# Patient Record
Sex: MALE | Race: ASIAN | Employment: UNEMPLOYED | URBAN - METROPOLITAN AREA
[De-identification: names, ages, dates, MRNs, and addresses within clinical notes are randomized per-mention and may not be internally consistent; named-entity substitution may affect disease eponyms.]

---

## 2022-05-25 ENCOUNTER — OFFICE VISIT (OUTPATIENT)
Dept: ORTHOPEDIC SURGERY | Age: 52
End: 2022-05-25
Payer: COMMERCIAL

## 2022-05-25 VITALS
WEIGHT: 233.4 LBS | HEIGHT: 70 IN | OXYGEN SATURATION: 99 % | TEMPERATURE: 97.1 F | BODY MASS INDEX: 33.41 KG/M2 | HEART RATE: 66 BPM

## 2022-05-25 DIAGNOSIS — G89.29 CHRONIC PAIN OF LEFT KNEE: Primary | ICD-10-CM

## 2022-05-25 DIAGNOSIS — M17.12 PRIMARY OSTEOARTHRITIS OF LEFT KNEE: ICD-10-CM

## 2022-05-25 DIAGNOSIS — M25.562 CHRONIC PAIN OF LEFT KNEE: Primary | ICD-10-CM

## 2022-05-25 PROCEDURE — 99203 OFFICE O/P NEW LOW 30 MIN: CPT | Performed by: SPECIALIST

## 2022-05-25 PROCEDURE — 20610 DRAIN/INJ JOINT/BURSA W/O US: CPT | Performed by: SPECIALIST

## 2022-05-25 PROCEDURE — 73562 X-RAY EXAM OF KNEE 3: CPT | Performed by: SPECIALIST

## 2022-05-25 RX ORDER — BETAMETHASONE SODIUM PHOSPHATE AND BETAMETHASONE ACETATE 3; 3 MG/ML; MG/ML
3 INJECTION, SUSPENSION INTRA-ARTICULAR; INTRALESIONAL; INTRAMUSCULAR; SOFT TISSUE ONCE
Status: COMPLETED | OUTPATIENT
Start: 2022-05-25 | End: 2022-05-25

## 2022-05-25 RX ADMIN — BETAMETHASONE SODIUM PHOSPHATE AND BETAMETHASONE ACETATE 3 MG: 3; 3 INJECTION, SUSPENSION INTRA-ARTICULAR; INTRALESIONAL; INTRAMUSCULAR; SOFT TISSUE at 09:28
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
## 2022-05-25 NOTE — PATIENT INSTRUCTIONS
Learning About Low-Carbohydrate Diets  What is a low-carbohydrate diet? A low-carbohydrate (or \"low-carb\") diet limits foods and drinks that have carbohydrates. This includes grains, fruits, milk and yogurt, and starchy vegetables like potatoes, beans, and corn. It also avoids foods and drinks that have added sugar. Instead, low-carb diets include foods that are high in protein and fat. Why might you follow a low-carb diet? Low-carb diets may be used for a variety of reasons, such as for weight loss. People who have diabetes may use a low-carb diet to help manage their blood sugar levels. What should you do before you start the diet? Talk to your doctor before you try any diet. This is even more important if you have health problems like kidney disease, heart disease, or diabetes. Your doctor may suggest that you meet with a registered dietitian. A dietitian can help you make an eating plan that works for you. What foods do you eat on a low-carb diet? On a low-carb diet, you choose foods that are high in protein and fat. Examples of these are:  · Meat, poultry, and fish. · Eggs. · Nuts, such as walnuts, pecans, almonds, and peanuts. · Peanut butter and other nut butters. · Tofu. · Avocado. · [place]. · Non-starchy vegetables like broccoli, cauliflower, green beans, mushrooms, peppers, lettuce, and spinach. · Unsweetened non-dairy milks like almond milk and coconut milk. · Cheese, cottage cheese, and cream cheese. Where can you learn more? Go to http://www.gray.com/  Enter C335 in the search box to learn more about \"Learning About Low-Carbohydrate Diets. \"  Current as of: September 8, 2021               Content Version: 13.2  © 1441-3631 [employer]wise[employer]. Care instructions adapted under license by [employer] (which disclaims liability or warranty for this information).  If you have questions about a medical condition or this instruction, always ask your healthcare professional. [address] any warranty or liability for your use of this information. Knee Arthritis: Exercises  Introduction  Here are some examples of exercises for you to try. The exercises may be suggested for a condition or for rehabilitation. Start each exercise slowly. Ease off the exercises if you start to have pain. You will be told when to start these exercises and which ones will work best for you. How to do the exercises  Knee flexion with heel slide    1. Lie on your back with your knees bent. 2. Slide your heel back by bending your affected knee as far as you can. Then hook your other foot around your ankle to help pull your heel even farther back. 3. Hold for about 6 seconds, then rest for up to 10 seconds. 4. Repeat 8 to 12 times. 5. Switch legs and repeat steps 1 through 4, even if only one knee is sore. Quad sets    1. Sit with your affected leg straight and supported on the floor or a firm bed. Place a small, rolled-up towel under your knee. Your other leg should be bent, with that foot flat on the floor. 2. Tighten the thigh muscles of your affected leg by pressing the back of your knee down into the towel. 3. Hold for about 6 seconds, then rest for up to 10 seconds. 4. Repeat 8 to 12 times. 5. Switch legs and repeat steps 1 through 4, even if only one knee is sore. Straight-leg raises to the front    1. Lie on your back with your good knee bent so that your foot rests flat on the floor. Your affected leg should be straight. Make sure that your low back has a normal curve. You should be able to slip your hand in between the floor and the small of your back, with your palm touching the floor and your back touching the back of your hand. 2. Tighten the thigh muscles in your affected leg by pressing the back of your knee flat down to the floor. Hold your knee straight.   3. Keeping the thigh muscles tight and your leg straight, lift your affected leg up so that your heel is about 12 inches off the floor. Hold for about 6 seconds, then lower slowly. 4. Relax for up to 10 seconds between repetitions. 5. Repeat 8 to 12 times. 6. Switch legs and repeat steps 1 through 5, even if only one knee is sore. Active knee flexion    1. Lie on your stomach with your knees straight. If your kneecap is uncomfortable, roll up a washcloth and put it under your leg just above your kneecap. 2. Lift the foot of your affected leg by bending the knee so that you bring the foot up toward your buttock. If this motion hurts, try it without bending your knee quite as far. This may help you avoid any painful motion. 3. Slowly move your leg up and down. 4. Repeat 8 to 12 times. 5. Switch legs and repeat steps 1 through 4, even if only one knee is sore. Quadriceps stretch (facedown)    1. Lie flat on your stomach, and rest your face on the floor. 2. Wrap a towel or belt strap around the lower part of your affected leg. Then use the towel or belt strap to slowly pull your heel toward your buttock until you feel a stretch. 3. Hold for about 15 to 30 seconds, then relax your leg against the towel or belt strap. 4. Repeat 2 to 4 times. 5. Switch legs and repeat steps 1 through 4, even if only one knee is sore. Stationary exercise bike    1. If you do not have a stationary exercise bike at home, you can find one to ride at your local health club or community center. 2. Adjust the height of the bike seat so that your knee is slightly bent when your leg is extended downward. If your knee hurts when the pedal reaches the top, you can raise the seat so that your knee does not bend as much. 3. Start slowly. At first, try to do 5 to 10 minutes of cycling with little to no resistance. Then increase your time and the resistance bit by bit until you can do 20 to 30 minutes without pain.   4. If you start to have pain, rest your knee until your pain gets back to the level that is normal for you. Or cycle for less time or with less effort. Follow-up care is a key part of your treatment and safety. Be sure to make and go to all appointments, and call your doctor if you are having problems. It's also a good idea to know your test results and keep a list of the medicines you take. Where can you learn more? Go to http://www.becerril.com/  Enter C159 in the search box to learn more about \"Knee Arthritis: Exercises. \"  Current as of: July 1, 2021               Content Version: 13.2  © 2612-9681 [employer]. Care instructions adapted under license by [employer] (which disclaims liability or warranty for this information). If you have questions about a medical condition or this instruction, always ask your healthcare professional. [address] any warranty or liability for your use of this information.

## 2022-07-16 ENCOUNTER — HOSPITAL ENCOUNTER (EMERGENCY)
Age: 52
Discharge: HOME | End: 2022-07-16
Attending: STUDENT IN AN ORGANIZED HEALTH CARE EDUCATION/TRAINING PROGRAM
Payer: COMMERCIAL

## 2022-07-16 ENCOUNTER — APPOINTMENT (OUTPATIENT)
Dept: GENERAL RADIOLOGY | Age: 52
End: 2022-07-16
Attending: PHYSICIAN ASSISTANT
Payer: COMMERCIAL

## 2022-07-16 VITALS
RESPIRATION RATE: 18 BRPM | HEIGHT: 70 IN | SYSTOLIC BLOOD PRESSURE: 138 MMHG | TEMPERATURE: 97.9 F | BODY MASS INDEX: 33.36 KG/M2 | HEART RATE: 74 BPM | DIASTOLIC BLOOD PRESSURE: 82 MMHG | OXYGEN SATURATION: 96 % | WEIGHT: 233 LBS

## 2022-07-16 DIAGNOSIS — M77.32 BONE SPUR OF INFERIOR PORTION OF LEFT CALCANEUS: ICD-10-CM

## 2022-07-16 DIAGNOSIS — S91.302A WOUND OF LEFT FOOT: Primary | ICD-10-CM

## 2022-07-16 PROCEDURE — 74011250636 HC RX REV CODE- 250/636: Performed by: PHYSICIAN ASSISTANT

## 2022-07-16 PROCEDURE — 73630 X-RAY EXAM OF FOOT: CPT

## 2022-07-16 PROCEDURE — 90715 TDAP VACCINE 7 YRS/> IM: CPT | Performed by: PHYSICIAN ASSISTANT

## 2022-07-16 PROCEDURE — 99284 EMERGENCY DEPT VISIT MOD MDM: CPT

## 2022-07-16 PROCEDURE — 90471 IMMUNIZATION ADMIN: CPT

## 2022-07-16 RX ORDER — CEPHALEXIN 500 MG/1
500 CAPSULE ORAL 4 TIMES DAILY
Qty: 28 CAPSULE | Refills: 0 | Status: SHIPPED | OUTPATIENT
Start: 2022-07-16 | End: 2022-07-23

## 2022-07-16 RX ORDER — NAPROXEN 500 MG/1
500 TABLET ORAL 2 TIMES DAILY WITH MEALS
Qty: 20 TABLET | Refills: 0 | Status: SHIPPED | OUTPATIENT
Start: 2022-07-16

## 2022-07-16 RX ADMIN — TETANUS TOXOID, REDUCED DIPHTHERIA TOXOID AND ACELLULAR PERTUSSIS VACCINE, ADSORBED 0.5 ML: 5; 2.5; 8; 8; 2.5 SUSPENSION INTRAMUSCULAR at 10:27

## 2022-07-16 NOTE — ED TRIAGE NOTES
Pt states their has been a wound on the bottom of his left foot times 1 week. Pt states the wound has been bleeding however he has no pain. Pt states he mishra not remember an specific injury.

## 2022-07-16 NOTE — ED PROVIDER NOTES
EMERGENCY DEPARTMENT HISTORY AND PHYSICAL EXAM      Date: 7/16/2022  Patient Name: Valorie Alicia    History of Presenting Illness     Chief Complaint   Patient presents with    Skin Problem     left foot        History Provided By: Patient    HPI: Valorie Alicia, 46 y.o. male no significant PMHx presents PMHx to the ED. Pt reports pain and wound to the bottom of his foot that began 1 week ago. Patient reports bleeding at times. Denies known trauma or injury. Patient unsure how wound occurred. Denies history of diabetes. Denies fever, chills, drainage. Patient has not taken anything for symptoms. Patient reports he has been applying ointment to wound. Unsure of last tetanus date. There are no other complaints, changes, or physical findings at this time. PCP: None    No current facility-administered medications on file prior to encounter. No current outpatient medications on file prior to encounter. Past History     Past Medical History:  No past medical history on file. Past Surgical History:  Past Surgical History:   Procedure Laterality Date    HX HERNIA REPAIR         Family History:  No family history on file. Social History:  Social History     Tobacco Use    Smoking status: Never Smoker    Smokeless tobacco: Never Used   Vaping Use    Vaping Use: Never used   Substance Use Topics    Alcohol use: Not Currently    Drug use: Never       Allergies:  No Known Allergies      Review of Systems   Review of Systems   Constitutional: Negative for chills and fever. HENT: Negative for facial swelling. Eyes: Negative for photophobia and visual disturbance. Respiratory: Negative for shortness of breath. Cardiovascular: Negative for chest pain. Gastrointestinal: Negative for abdominal pain, nausea and vomiting. Genitourinary: Negative for flank pain. Musculoskeletal: Positive for arthralgias (left foot). Skin: Negative for color change, pallor, rash and wound. Neurological: Negative for dizziness, weakness, light-headedness and headaches. All other systems reviewed and are negative. Physical Exam   Physical Exam  Vitals and nursing note reviewed. Constitutional:       General: He is not in acute distress. Appearance: He is well-developed. Comments: Pt in NAD   HENT:      Head: Normocephalic and atraumatic. Eyes:      Conjunctiva/sclera: Conjunctivae normal.   Cardiovascular:      Rate and Rhythm: Normal rate and regular rhythm. Heart sounds: Normal heart sounds. Pulmonary:      Effort: Pulmonary effort is normal. No respiratory distress. Breath sounds: Normal breath sounds. Abdominal:      General: Bowel sounds are normal.      Palpations: Abdomen is soft. Tenderness: There is no abdominal tenderness. Musculoskeletal:         General: Normal range of motion. Skin:     General: Skin is warm. Findings: No rash. Comments: Left foot: Maceration with superficial open wound to plantar aspect of foot. Nontender. No erythema or warmth. No foreign body visualized. Nontender. Surrounding sensation intact. Neurological:      Mental Status: He is alert and oriented to person, place, and time. Cranial Nerves: No cranial nerve deficit. Psychiatric:         Behavior: Behavior normal.         Diagnostic Study Results     Labs -   No results found for this or any previous visit (from the past 12 hour(s)). Radiologic Studies -   XR FOOT LT MIN 3 V    (Results Pending)     CT Results  (Last 48 hours)    None        CXR Results  (Last 48 hours)    None          Medical Decision Making   I am the first provider for this patient. I reviewed the vital signs, available nursing notes, past medical history, past surgical history, family history and social history. Vital Signs-Reviewed the patient's vital signs.   Patient Vitals for the past 12 hrs:   Temp Pulse Resp BP SpO2   07/16/22 0959 97.9 °F (36.6 °C) -- -- -- -- 07/16/22 0954 -- 74 18 138/82 96 %     Records Reviewed: Nursing Notes, Old Medical Records, Previous Radiology Studies and Previous Laboratory Studies    Provider Notes (Medical Decision Making):   DDx: Maceration, Laceration, Fb in wound, Tetanus, Abrasion    45 yo M who presents with wound to plantar aspect of foot that occurred 1 week ago. On exam macerated skin with superficial open wound. No signs of infection. Xray shows superficial wound vs FB. US used to confirm no FB. Tetanus updated in ED. Will cover with abx and discussed importance of keeping wound open to air. Stop applying ointment. At time of discharge, pt non-toxic appearing in NAD. Pt stable for prompt outpatient follow-up with PCP 1 to 2 days. Patient given strict instructions to return if symptoms worsen. ED Course:   Initial assessment performed. The patients presenting problems have been discussed, and they are in agreement with the care plan formulated and outlined with them. I have encouraged them to ask questions as they arise throughout their visit. Xray shows possible FB vs superficial wound. Ultrasound performed and no foreign body visualized. Disposition:  10:11 AM  Discussed imaging results with pt along with dx and treatment plan. Discussed importance of PCP follow up. All questions answered. Pt voiced they understood. Return if sx worsen. PLAN:  1. Current Discharge Medication List      START taking these medications    Details   cephALEXin (Keflex) 500 mg capsule Take 1 Capsule by mouth four (4) times daily for 7 days. Qty: 28 Capsule, Refills: 0  Start date: 7/16/2022, End date: 7/23/2022           2.    Follow-up Information     Follow up With Specialties Details Why [address] [place] [address]  Schedule an appointment as soon as possible for a visit in 1 day  Tulio Reynoso [address]  Chele Erika [address] [place] [address] DEVORAHMichael [facility] EMERGENCY DEPT Emergency Medicine  As needed, If symptoms worsen [address] [facility] [address]  [phone]        Return to ED if worse     Diagnosis     Clinical Impression:   1. Wound of left foot        Attestations:    MASHA Arana    Please note that this dictation was completed with [employer], the computer voice recognition software. Quite often unanticipated grammatical, syntax, homophones, and other interpretive errors are inadvertently transcribed by the computer software. Please disregard these errors. Please excuse any errors that have escaped final proofreading. Thank you.

## 2022-07-21 ENCOUNTER — OFFICE VISIT (OUTPATIENT)
Dept: ORTHOPEDIC SURGERY | Age: 52
End: 2022-07-21
Payer: COMMERCIAL

## 2022-07-21 VITALS
BODY MASS INDEX: 32.7 KG/M2 | OXYGEN SATURATION: 98 % | WEIGHT: 233.6 LBS | TEMPERATURE: 97.7 F | HEART RATE: 70 BPM | HEIGHT: 71 IN

## 2022-07-21 DIAGNOSIS — M17.12 PRIMARY OSTEOARTHRITIS OF LEFT KNEE: Primary | ICD-10-CM

## 2022-07-21 DIAGNOSIS — M25.562 CHRONIC PAIN OF LEFT KNEE: ICD-10-CM

## 2022-07-21 DIAGNOSIS — G89.29 CHRONIC PAIN OF LEFT KNEE: ICD-10-CM

## 2022-07-21 PROCEDURE — 20610 DRAIN/INJ JOINT/BURSA W/O US: CPT | Performed by: SPECIALIST

## 2022-07-21 RX ORDER — BETAMETHASONE SODIUM PHOSPHATE AND BETAMETHASONE ACETATE 3; 3 MG/ML; MG/ML
3 INJECTION, SUSPENSION INTRA-ARTICULAR; INTRALESIONAL; INTRAMUSCULAR; SOFT TISSUE ONCE
Status: COMPLETED | OUTPATIENT
Start: 2022-07-21 | End: 2022-07-21

## 2022-07-21 RX ADMIN — BETAMETHASONE SODIUM PHOSPHATE AND BETAMETHASONE ACETATE 3 MG: 3; 3 INJECTION, SUSPENSION INTRA-ARTICULAR; INTRALESIONAL; INTRAMUSCULAR; SOFT TISSUE at 11:09
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
## 2022-08-08 ENCOUNTER — HOSPITAL ENCOUNTER (OUTPATIENT)
Dept: PHYSICAL THERAPY | Age: 52
Discharge: HOME | End: 2022-08-08
Attending: SPECIALIST
Payer: COMMERCIAL

## 2022-08-08 DIAGNOSIS — M25.562 CHRONIC PAIN OF LEFT KNEE: ICD-10-CM

## 2022-08-08 DIAGNOSIS — M17.12 PRIMARY OSTEOARTHRITIS OF LEFT KNEE: Primary | ICD-10-CM

## 2022-08-08 DIAGNOSIS — G89.29 CHRONIC PAIN OF LEFT KNEE: ICD-10-CM

## 2022-08-08 PROCEDURE — 97162 PT EVAL MOD COMPLEX 30 MIN: CPT
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
## 2022-08-17 ENCOUNTER — TELEPHONE (OUTPATIENT)
Dept: PHYSICAL THERAPY | Age: 52
End: 2022-08-17

## 2022-08-23 ENCOUNTER — HOSPITAL ENCOUNTER (OUTPATIENT)
Dept: PHYSICAL THERAPY | Age: 52
Discharge: HOME | End: 2022-08-23
Attending: SPECIALIST
Payer: COMMERCIAL

## 2022-08-23 PROCEDURE — 97110 THERAPEUTIC EXERCISES: CPT

## 2022-08-23 PROCEDURE — 97530 THERAPEUTIC ACTIVITIES: CPT

## 2022-08-23 PROCEDURE — 97112 NEUROMUSCULAR REEDUCATION: CPT

## 2022-08-23 NOTE — PROGRESS NOTES
PT DAILY TREATMENT NOTE     Patient Name: Alfonso López  Date:2022  : 1970  [x]  Patient  Verified  Payor: Darin Jeter / Plan: [address] / Product Type: Managed Care Medicaid /    In time:10;30  Out time:11:15  Total Treatment Time (min): 45  Visit #: 2 of 10    Medicare/BCBS Only   Total Timed Codes (min):   1:1 Treatment Time:         Treatment Area: No admission diagnoses are documented for this encounter. SUBJECTIVE  Pain Level (0-10 scale): 7  Any medication changes, allergies to medications, adverse drug reactions, diagnosis change, or new procedure performed?: [x] No    [] Yes (see summary sheet for update)  Subjective functional status/changes:   [] No changes reported  I'm thankful to be here and getting help for my leg    OBJECTIVE      20 min Therapeutic Exercise:  [] See flow sheet :   Rationale: increase ROM and increase strength to improve the patients ability to perofrm ADL's more easily    10 min Therapeutic Activity:  []  See flow sheet :   Rationale: increase ROM, increase strength, and improve coordination  to improve the patients ability to improve ease of squats, gait     15 min Neuromuscular Re-education:  []  See flow sheet :   Rationale: increase strength, improve coordination, and increase proprioception  to improve the patients ability to increase knee stability to negotiate stairs          With   [] TE   [] TA   [] neuro   [] other: Patient Education: [x] Review HEP    [] Progressed/Changed HEP based on:   [] positioning   [] body mechanics   [] transfers   [] heat/ice application    [] other:      Other Objective/Functional Measures: initiated ex program     Pain Level (0-10 scale) post treatment: 3    ASSESSMENT/Changes in Function: first follow-up after evaluation. Treatment program initiated for strength and stability. Pt is highly motivated and responds to instructions well.   Challenged to maintain knee extension (within available ROM limits) during SLR. No increased symptoms sat end of session    Patient will continue to benefit from skilled PT services to modify and progress therapeutic interventions, address functional mobility deficits, address ROM deficits, address strength deficits, analyze and address soft tissue restrictions, analyze and cue movement patterns, analyze and modify body mechanics/ergonomics, assess and modify postural abnormalities, address imbalance/dizziness, and instruct in home and community integration to attain remaining goals. []  See Plan of Care  []  See progress note/recertification  []  See Discharge Summary         Progress towards goals / Updated goals:  Goal: Pt to be compliant with initial HEP to improve left hip/knee mobility and strength for normalized gait and ease of ADLs. Status at last note/certification: Established and reviewed with Pt  Long Term Goals: To be accomplished in 5 weeks:  Goal: Pt to increase left knee AROM to 0 to 134 deg without increased pain for ease of squatting and stair negotiation. Status at last note/certification: -5 to 118 deg (supine) (TKE rests at -12 deg)  Goal: Pt to increase B hip ext, left hip ER strength to 4+/5 grossly to increase stability with gait and stair negotiation. Status at last note/certification: 3+/5 grossly  Goal: Pt to perform SLS x 30 seconds to show improved single limb stability and work towards consistent independence with gait. Status at last note/certification: 22 seconds right, 2 seconds left  Goal: Pt to report < 3/10 pain at worst to increase ease with ADLs. Status at last note/certification: 0/35 pain at worst  Goal: Pt to report FOTO score of 72 pts to show improved function and quality of life.   Status at last note/certification: FOTO 65 pts     PLAN  []  Upgrade activities as tolerated     []  Continue plan of care  []  Update interventions per flow sheet       []  Discharge due to:_  []  Other:_      Miranda Bending, MADELINE 8/23/2022  10:52 AM    Future Appointments   Date Time Provider Marco Sellers   8/26/2022 10:30 AM Racheal Beal

## 2022-08-26 ENCOUNTER — HOSPITAL ENCOUNTER (OUTPATIENT)
Dept: PHYSICAL THERAPY | Age: 52
Discharge: HOME | End: 2022-08-26
Attending: SPECIALIST
Payer: COMMERCIAL

## 2022-08-26 PROCEDURE — 97112 NEUROMUSCULAR REEDUCATION: CPT

## 2022-08-26 PROCEDURE — 97014 ELECTRIC STIMULATION THERAPY: CPT

## 2022-08-26 PROCEDURE — 97110 THERAPEUTIC EXERCISES: CPT

## 2022-08-26 PROCEDURE — 97530 THERAPEUTIC ACTIVITIES: CPT

## 2022-08-26 NOTE — PROGRESS NOTES
PT DAILY TREATMENT NOTE     Patient Name: Nivia Barrios  Date:2022  : 1970  [x]  Patient  Verified  Payor: Darin 22 / Plan: [address] / Product Type: Managed Care Medicaid /    In time:1035 am  Out time:1127 am  Total Treatment Time (min): 52  Visit #: 3 of 10    Medicare/BCBS Only   Total Timed Codes (min):  40 1:1 Treatment Time:  n/a       Treatment Area: Pain in left knee [M25.562]  Primary osteoarthritis of left knee [M17.12]    SUBJECTIVE  Pain Level (0-10 scale): 310  Any medication changes, allergies to medications, adverse drug reactions, diagnosis change, or new procedure performed?: [x] No    [] Yes (see summary sheet for update)  Subjective functional status/changes:   [] No changes reported  Per patient he has received cortisone shot and is awaiting possible gel injection     OBJECTIVE    Modality rationale: decrease pain to improve the patients ability to perform LE functional mobility, transfers, and ambulation with greater ease   Min Type Additional Details   10' + 2' set up [x] Estim:  [x]Unatt       [x]IFC  []Premod                        []Other:  [x]w/ice   []w/heat  Position: supine  Location: left knee     [] Estim: []Att    []TENS instruct  []NMES                    []Other:  []w/US   []w/ice   []w/heat  Position:  Location:    []  Traction: [] Cervical       []Lumbar                       [] Prone          []Supine                       []Intermittent   []Continuous Lbs:  [] before manual  [] after manual    []  Ultrasound: []Continuous   [] Pulsed                           []1MHz   []3MHz W/cm2:  Location:    []  Iontophoresis with dexamethasone         Location: [] Take home patch   [] In clinic    []  Ice     []  heat  []  Ice massage  []  Laser   []  Anodyne Position:   Location:     []  Laser with stim  []  Other:  Position:  Location:    []  Vasopneumatic Device    []  Right     []  Left  Pre-treatment girth:  Post-treatment girth: Measured at (location):  Pressure:       [] lo [] med [] hi   Temperature: [] lo [] med [] hi   [x] Skin assessment post-treatment:  [x]intact []redness- no adverse reaction    []redness - adverse reaction:       20 min Therapeutic Exercise:  [x] See flow sheet : elliptical, hip 3 ways, stretches, SLR, butt burner, monster walks    Rationale: increase ROM and increase strength to improve the patients ability to perform gait and transfers with improved LE strength and mobility. 12 min Therapeutic Activity:  [x]  See flow sheet : step ups, step downs, bridging   Rationale: increase strength, improve coordination, improve balance, and increase proprioception  to improve the patients ability to perform functional transfers, ambulation, stair negotiation. Patient education: benefits of IFC e-stim, contraindications to estim,      8 min Neuromuscular Re-education:  [x]  See flow sheet : balance, VMO, TKE   Rationale: increase strength, improve coordination, improve balance and increase proprioception  to improve the patients ability to perform stair negotiation and functional mobility in the home/community with improved LE control. With   [x] TE   [x] TA   [x] neuro   [] other: Patient Education: [x] Review HEP    [] Progressed/Changed HEP based on:   [] positioning   [] body mechanics   [] transfers   [] heat/ice application    [x] other: e-stim modality for pain management      Other Objective/Functional Measures:   SLS left: 2-3 seconds Right: 12 seconds (inconsistently bilat)   Initiated: eccentric step downs, elliptical, hip 3 ways with band, SLS    Added: clam shells     Pain Level (0-10 scale) post treatment: 1    ASSESSMENT/Changes in Function: Skilled cues and demonstration provided to perform therex with proper form, able to perform new interventions with no significant increased pain reported.  Pt with impaired balance bilat, left greater than right, with increased sway and arm flailing and UE contact assist at parallel bars for stabilizing. Skilled verbal cues provided for forward gaze and core engagement for increased steadying with balance therex. Pt reports decreased pain levels post PT session. Patient will continue to benefit from skilled PT services to modify and progress therapeutic interventions, address functional mobility deficits, address ROM deficits, address strength deficits, analyze and address soft tissue restrictions, analyze and cue movement patterns, analyze and modify body mechanics/ergonomics, assess and modify postural abnormalities and address imbalance/dizziness to attain remaining goals. []  See Plan of Care  []  See progress note/recertification  []  See Discharge Summary         Progress towards goals / Updated goals:  Progress towards goals / Updated goals:  Goal: Pt to be compliant with initial HEP to improve left hip/knee mobility and strength for normalized gait and ease of ADLs. Status at last note/certification: Established and reviewed with Pt  Current:     Long Term Goals: To be accomplished in 5 weeks:  Goal: Pt to increase left knee AROM to 0 to 134 deg without increased pain for ease of squatting and stair negotiation. Status at last note/certification: -5 to 118 deg (supine) (TKE rests at -12 deg)  Current:   Goal: Pt to increase B hip ext, left hip ER strength to 4+/5 grossly to increase stability with gait and stair negotiation. Status at last note/certification: 3+/5 grossly  Current:   Goal: Pt to perform SLS x 30 seconds to show improved single limb stability and work towards consistent independence with gait. Status at last note/certification: 22 seconds right, 2 seconds left  Current: SLS left: 2-3 seconds Right: 12 seconds (inconsistently bilat) (8/26/22)    Goal: Pt to report < 3/10 pain at worst to increase ease with ADLs.   Status at last note/certification: 8/87 pain at worst  Current:   Goal: Pt to report FOTO score of 72 pts to show improved function and quality of life.   Status at last note/certification: FOTO 65 pts   Current:     PLAN  [x]  Upgrade activities as tolerated     [x]  Continue plan of care  []  Update interventions per flow sheet       []  Discharge due to:_  []  Other:_      Samra Fuchs, PT 8/26/2022 11:43 AM     Future Appointments   Date Time Provider Marco Sellers   8/30/2022 11:15 AM Cally Saldivar [facility]   9/2/2022 10:30 AM Shirley Cash [facility]

## 2022-08-30 ENCOUNTER — HOSPITAL ENCOUNTER (OUTPATIENT)
Dept: PHYSICAL THERAPY | Age: 52
Discharge: HOME | End: 2022-08-30
Attending: SPECIALIST
Payer: COMMERCIAL

## 2022-08-30 PROCEDURE — 97112 NEUROMUSCULAR REEDUCATION: CPT

## 2022-08-30 PROCEDURE — 97110 THERAPEUTIC EXERCISES: CPT

## 2022-08-30 PROCEDURE — 97530 THERAPEUTIC ACTIVITIES: CPT

## 2022-08-30 NOTE — PROGRESS NOTES
PT DAILY TREATMENT NOTE     Patient Name: Candance Bode  Date:2022  : 1970  [x]  Patient  Verified  Payor: Darin 22 / Plan: [address] / Product Type: Managed Care Medicaid /    In time:11:15  Out time:12:10  Total Treatment Time (min): 55  Visit #: 4 of 10    Medicare/BCBS Only   Total Timed Codes (min):   1:1 Treatment Time:         Treatment Area: Pain in left knee [M25.562]  Primary osteoarthritis of left knee [M17.12]    SUBJECTIVE  Pain Level (0-10 scale): 3  Any medication changes, allergies to medications, adverse drug reactions, diagnosis change, or new procedure performed?: [x] No    [] Yes (see summary sheet for update)  Subjective functional status/changes:   [] No changes reported  I'm waiting on a call about getting the gel injections in my knee    OBJECTIVE    Modality rationale: decrease pain to improve the patients ability to reduce post session pain   Min Type Additional Details    [] Estim:  []Unatt       []IFC  []Premod                        []Other:  []w/ice   []w/heat  Position:  Location:    [] Estim: []Att    []TENS instruct  []NMES                    []Other:  []w/US   []w/ice   []w/heat  Position:  Location:    []  Traction: [] Cervical       []Lumbar                       [] Prone          []Supine                       []Intermittent   []Continuous Lbs:  [] before manual  [] after manual    []  Ultrasound: []Continuous   [] Pulsed                           []1MHz   []3MHz W/cm2:  Location:    []  Iontophoresis with dexamethasone         Location: [] Take home patch   [] In clinic   10 [x]  Ice     []  heat  []  Ice massage  []  Laser   []  Anodyne Position: sitting  Location: left knee    []  Laser with stim  []  Other:  Position:  Location:    []  Vasopneumatic Device    []  Right     []  Left  Pre-treatment girth:  Post-treatment girth:  Measured at (location):  Pressure:       [] lo [] med [] hi   Temperature: [] lo [] med [] hi [x] Skin assessment post-treatment:  [x]intact []redness- no adverse reaction    []redness - adverse reaction:         20 min Therapeutic Exercise:  [] See flow sheet :   Rationale: increase ROM and increase strength to improve the patients ability to perform ADL's, ambulation    10 min Therapeutic Activity:  []  See flow sheet :   Rationale: increase ROM and increase strength  to improve the patients ability to improve stair negotiation     15 min Neuromuscular Re-education:  []  See flow sheet :   Rationale: increase strength, improve coordination, and improve balance  to improve the patients ability to increase knee stability for stairs, functional mobility        With   [] TE   [] TA   [] neuro   [] other: Patient Education: [x] Review HEP    [] Progressed/Changed HEP based on:   [] positioning   [] body mechanics   [] transfers   [] heat/ice application    [] other:      Other Objective/Functional Measures: PD elliptical, too much pain last session. Pain Level (0-10 scale) post treatment: 0    ASSESSMENT/Changes in Function:  pt seen today to address Pain in left knee [M25.562], Primary osteoarthritis of left knee [M17.12]. Decreased step to 4 inches for improved eccentric descent and quad control, as he had significant compensatory strategies at 6 inch height. Poor SLS on left with excessive trunk sway and frequent UE balance checks    Patient will continue to benefit from skilled PT services to modify and progress therapeutic interventions, address functional mobility deficits, address ROM deficits, address strength deficits, analyze and address soft tissue restrictions, analyze and cue movement patterns, analyze and modify body mechanics/ergonomics, assess and modify postural abnormalities, address imbalance/dizziness, and instruct in home and community integration to attain remaining goals.      []  See Plan of Care  []  See progress note/recertification  []  See Discharge Summary Progress towards goals / Updated goals:  Goal: Pt to be compliant with initial HEP to improve left hip/knee mobility and strength for normalized gait and ease of ADLs. Status at last note/certification: Established and reviewed with Pt  Current:      Long Term Goals: To be accomplished in 5 weeks:  Goal: Pt to increase left knee AROM to 0 to 134 deg without increased pain for ease of squatting and stair negotiation. Status at last note/certification: -5 to 118 deg (supine) (TKE rests at -12 deg)  Current:   Goal: Pt to increase B hip ext, left hip ER strength to 4+/5 grossly to increase stability with gait and stair negotiation. Status at last note/certification: 3+/5 grossly  Current:  reassess next session  Goal: Pt to perform SLS x 30 seconds to show improved single limb stability and work towards consistent independence with gait. Status at last note/certification: 22 seconds right, 2 seconds left  Current: SLS left: 2-3 seconds Right: 12 seconds (inconsistently bilat) (8/26/22)         Goal: Pt to report < 3/10 pain at worst to increase ease with ADLs. Status at last note/certification: 1/90 pain at worst  Current:   Goal: Pt to report FOTO score of 72 pts to show improved function and quality of life.   Status at last note/certification: FOTO 65 pts   Current:        PLAN  [x]  Upgrade activities as tolerated     []  Continue plan of care  []  Update interventions per flow sheet       []  Discharge due to:_  []  Other:_      Brenda August PTA 8/30/2022  11:20 AM    Future Appointments   Date Time Provider Marco Sellers   9/2/2022 10:30 AM Kelsie Connors PTA [facility] [address]

## 2022-09-02 ENCOUNTER — HOSPITAL ENCOUNTER (OUTPATIENT)
Dept: PHYSICAL THERAPY | Age: 52
Discharge: HOME | End: 2022-09-02
Attending: SPECIALIST
Payer: COMMERCIAL

## 2022-09-02 PROCEDURE — 97110 THERAPEUTIC EXERCISES: CPT

## 2022-09-02 PROCEDURE — 97530 THERAPEUTIC ACTIVITIES: CPT

## 2022-09-02 PROCEDURE — 97112 NEUROMUSCULAR REEDUCATION: CPT

## 2022-09-02 NOTE — PROGRESS NOTES
[facility] - [facility], [address] [place] [address]  [phone] fax    Progress Note  Patient name: Omar Reardon Start of Care: 22   Referral source: Solange Estes MD : 1970   Medical/Treatment Diagnosis: Pain in left knee [M25.562]  Primary osteoarthritis of left knee [M17.12]  [facility]  / Plan: [address] / Product Type: Managed Care Medicaid /  Onset Date:22     Prior Hospitalization: see medical history Provider#: [id number]   Medications: Verified on Patient Summary List    Comorbidities: Arthritis; BMI > 30   Prior Level of Function: In [employer] school to become ; lives in [facility] with [facility]; use of SPC in home, community    Visits from [place] of Care: 5    Missed Visits: 0    Progress toward goals:   Goal: Pt to be compliant with initial HEP to improve left hip/knee mobility and strength for normalized gait and ease of ADLs. Status at last note/certification: Established and reviewed with Pt  Current: pt reports compliance (22) will plan to update next visit      Long Term Goals: To be accomplished in 5 weeks:  Goal: Pt to increase left knee AROM to 0 to 134 deg without increased pain for ease of squatting and stair negotiation. Status at last note/certification: -5 to 118 deg (supine) (TKE rests at -12 deg)  Current: Knee AROM:  left: lacking 10 ext -114 deg of flexion (22) not met   Goal: Pt to increase B hip ext, left hip ER strength to 4+/5 grossly to increase stability with gait and stair negotiation.   Status at last note/certification: 3+/5 grossly  Current:  progressing, not met (22)   Strength:    Right (/5) Left (/5)   Hip     Flexion 5 5             Abduction 5 5             Adduction 4+ 4             Extension 5 4             ER 5 4             IR 5 4+      Goal: Pt to perform SLS x 30 seconds to show improved single limb stability and work towards consistent independence with gait. Status at last note/certification: 22 seconds right, 2 seconds left  Current: SLS left: 2-3 seconds Right: 12 seconds (inconsistently bilat) (8/26/22)       SLS: Right: 25 sec Left: 9 sec (9/2/22) progressing   Goal: Pt to report < 3/10 pain at worst to increase ease with ADLs. Status at last note/certification: 9/61 pain at worst  Current: Pain at worst in the last week: 3/10; on average: 4/10 (9/2/22) progressing   Goal: Pt to report FOTO score of 72 pts to show improved function and quality of life. Status at last note/certification: FOTO 65 pts   Current: 46 (9/2/22) regression, not met though patient does not report functional decline     Key Functional Changes:   Pt is a 46y.o. year old male who has been receiving skilled OP PT services at [facility] with Pain in left knee [M25.562]  Primary osteoarthritis of left knee [M17.12]. Physical therapy has consisted of therapeutic exercises for increased strength, improved ROM; neuromuscular re-education for improved motor control and balance; therapeutic activities for ease with functional transfers; modalities to decrease pain, decrease inflammation. Pt has been seen for initial evaluation and 4 follow up visits, making good progress toward set goals. Pt demonstrates improved strength, though continues to present with decreased left LE strength compared to right grossly. Noted regression in FOTO score though patient does not report functional decline. Pt motivated to participate during sessions. Will plan to continue to progress patient, as able, and instruct patient in updated, comprehensive HEP in preparation for discharge.    Today's assessment is significant for the following functional and objective measures taken:   Pain at worst in the last week: 3/10; on average: 4/10  Subjective % improvement since start of care: 50%  Functional improvements: improved mobility  Functional limitations: going up and down the stairs, cramping  FOTO: 52      Knee AROM:  left: lacking 10 ext -114 deg of flexion     SLS: Right: 25 sec Left: 9 sec     Strength:    Right (/5) Left (/5)   Hip     Flexion 5 5             Abduction 5 5             Adduction 4+ 4             Extension 5 4             ER 5 4             IR 5 4+      Palpable crepitus in left knee with VMO ball squeeze and SAQ      Pt will continue to benefit from skilled OP PT 1-2 x per week for 4 weeks in order to address remaining and above mentioned impairments to maximize patient's functional status and independence. Updated Goals: to be achieved in 4 weeks:  Continue with above unmet goals     Goal: Pt to increase left knee AROM to 0 to 134 deg without increased pain for ease of squatting and stair negotiation. Status at last note/certification:  Knee AROM:  left: lacking 10 ext -114 deg of flexion   Current:   Goal: Pt to increase B hip ext, left hip ER strength to 4+/5 grossly to increase stability with gait and stair negotiation. Status at last note/certification:   Strength:    Right (/5) Left (/5)   Hip     Flexion 5 5             Abduction 5 5             Adduction 4+ 4             Extension 5 4             ER 5 4             IR 5 4+    Current:   Goal: Pt to perform SLS x 30 seconds to show improved single limb stability and work towards consistent independence with gait. Status at last note/certification:  SLS: Right: 25 sec Left: 9 sec (9/2/22) progressing    Current:   Goal: Pt to report < 3/10 pain at worst to increase ease with ADLs. Status at last note/certification:  Pain at worst in the last week: 3/10; on average: 4/10    Current:   Goal: Pt to report FOTO score of 72 pts to show improved function and quality of life.   Status at last note/certification:  52 (3/3/49) regression, not met though patient does not report functional decline    Current:   Goal: Patient  will be independent  with comprehensive updated HEP to continue to manage care independently after discharge. Status at last note/certification: new goal    Current:     ASSESSMENT/RECOMMENDATIONS:  [x]Continue therapy per initial plan/protocol at a frequency of  1-2 x per week for 4 weeks  []Continue therapy with the following recommended changes:_____________________      _____________________________________________________________________  []Discontinue therapy progressing towards or have reached established goals  []Discontinue therapy due to lack of appreciable progress towards goals  []Discontinue therapy due to lack of attendance or compliance  []Await Physician's recommendations/decisions regarding therapy  []Other:________________________________________________________________    Thank you for this referral.   Kimberly King, PT 9/2/2022 12:56 PM   NOTE TO PHYSICIAN:  [address] AND   FAX TO [facility]: [phone]  If you are unable to process this request in 24 hours please contact our office: 21 [phone]  []  I have read the above report and request that my patient continue as recommended. []  I have read the above report and request that my patient continue therapy with the following changes/special instructions:________________________________________  []I have read the above report and request that my patient be discharged from therapy.     [de-identified] Signature:____________Date:_________TIME:________     Glory Robin, MD  ** Signature, Date and Time must be completed for valid certification **

## 2022-09-02 NOTE — PROGRESS NOTES
PT DAILY TREATMENT NOTE     Patient Name: Yury Perez  Date:2022  : 1970  [x]  Patient  Verified  Payor: Darin 22 / Plan: [address] / Product Type: Managed Care Medicaid /    In time:1033 am  Out time:1126 am  Total Treatment Time (min): 48  Visit #: 5 of 10    Medicare/BCBS Only   Total Timed Codes (min):  43 1:1 Treatment Time:  43       Treatment Area: Pain in left knee [M25.562]  Primary osteoarthritis of left knee [M17.12]    SUBJECTIVE  Pain Level (0-10 scale): 3/10  Any medication changes, allergies to medications, adverse drug reactions, diagnosis change, or new procedure performed?: [x] No    [] Yes (see summary sheet for update)  Subjective functional status/changes:   [] No changes reported  Pt reports he is awaiting appointment for the gel shot     OBJECTIVE    Modality rationale: decrease pain to improve the patients ability to perform LE functional mobility, transfers, and ambulation with greater ease   Min Type Additional Details    [] Estim:  []Unatt       []IFC  []Premod                        []Other:  []w/ice   []w/heat  Position: supine  Location: left knee     [] Estim: []Att    []TENS instruct  []NMES                    []Other:  []w/US   []w/ice   []w/heat  Position:  Location:    []  Traction: [] Cervical       []Lumbar                       [] Prone          []Supine                       []Intermittent   []Continuous Lbs:  [] before manual  [] after manual    []  Ultrasound: []Continuous   [] Pulsed                           []1MHz   []3MHz W/cm2:  Location:    []  Iontophoresis with dexamethasone         Location: [] Take home patch   [] In clinic   10' [x]  Ice     []  heat  []  Ice massage  []  Laser   []  Anodyne Position: reclined  Location: left knee    []  Laser with stim  []  Other:  Position:  Location:    []  Vasopneumatic Device    []  Right     []  Left  Pre-treatment girth:  Post-treatment girth:  Measured at (location): Pressure:       [] lo [] med [] hi   Temperature: [] lo [] med [] hi   [x] Skin assessment post-treatment:  [x]intact []redness- no adverse reaction    []redness - adverse reaction:       18 min Therapeutic Exercise:  [x] See flow sheet :   Rationale: increase ROM and increase strength to improve the patients ability to perform gait and transfers with improved LE strength and mobility. 10 min Therapeutic Activity:  [x]  See flow sheet :  PN assess    Rationale: increase strength, improve coordination, improve balance, and increase proprioception  to improve the patients ability to perform functional transfers, ambulation, stair negotiation. 15 min Neuromuscular Re-education:  [x]  See flow sheet :    Rationale: increase strength, improve coordination, improve balance and increase proprioception  to improve the patients ability to perform stair negotiation and functional mobility in the home/community with improved LE control and proximal hip strability.           With   [x] TE   [x] TA   [x] neuro   [] other: Patient Education: [x] Review HEP    [] Progressed/Changed HEP based on:   [] positioning   [] body mechanics   [] transfers   [] heat/ice application    [] other: pt's progress      Other Objective/Functional Measures:   Pain at worst in the last week: 3/10; on average: 4/10  Subjective % improvement since start of care: 50%  Functional improvements: improved mobility  Functional limitations: going up and down the stairs, cramping  FOTO: 52     Knee AROM:  left: lacking 10 ext -114 deg of flexion    SLS: Right: 25 sec Left: 9 sec    Strength:   Right (/5) Left (/5)   Hip     Flexion 5 5             Abduction 5 5             Adduction 4+ 4             Extension 5 4             ER 5 4             IR 5 4+       Pain Level (0-10 scale) post treatment: 0    ASSESSMENT/Changes in Function: See PN    Patient will continue to benefit from skilled PT services to modify and progress therapeutic interventions, address functional mobility deficits, address ROM deficits, address strength deficits, analyze and address soft tissue restrictions, analyze and cue movement patterns, analyze and modify body mechanics/ergonomics, assess and modify postural abnormalities and address imbalance/dizziness to attain remaining goals. []  See Plan of Care  [x]  See progress note/recertification  []  See Discharge Summary         Progress towards goals / Updated goals:  Goal: Pt to be compliant with initial HEP to improve left hip/knee mobility and strength for normalized gait and ease of ADLs. Status at last note/certification: Established and reviewed with Pt  Current: pt reports compliance (9/2/22) will plan to update next visit      Long Term Goals: To be accomplished in 5 weeks:  Goal: Pt to increase left knee AROM to 0 to 134 deg without increased pain for ease of squatting and stair negotiation. Status at last note/certification: -5 to 118 deg (supine) (TKE rests at -12 deg)  Current: Knee AROM:  left: lacking 10 ext -114 deg of flexion (9/2/22) not met   Goal: Pt to increase B hip ext, left hip ER strength to 4+/5 grossly to increase stability with gait and stair negotiation. Status at last note/certification: 3+/5 grossly  Current:  progressing, not met (9/2/22)   Strength:   Right (/5) Left (/5)   Hip     Flexion 5 5             Abduction 5 5             Adduction 4+ 4             Extension 5 4             ER 5 4             IR 5 4+     Goal: Pt to perform SLS x 30 seconds to show improved single limb stability and work towards consistent independence with gait. Status at last note/certification: 22 seconds right, 2 seconds left  Current: SLS left: 2-3 seconds Right: 12 seconds (inconsistently bilat) (8/26/22)       SLS: Right: 25 sec Left: 9 sec (9/2/22) progressing   Goal: Pt to report < 3/10 pain at worst to increase ease with ADLs.   Status at last note/certification: 9/88 pain at worst  Current: Pain at worst in the last week: 3/10; on average: 4/10 (9/2/22) progressing   Goal: Pt to report FOTO score of 72 pts to show improved function and quality of life.   Status at last note/certification: FOTO 65 pts   Current: 52 (9/2/22) regression, not met though patient does not report functional decline     PLAN  [x]  Upgrade activities as tolerated     [x]  Continue plan of care  []  Update interventions per flow sheet       []  Discharge due to:_  [x]  Other: See progress note       Nusrat Schrader, PT 9/2/2022 12:51 PM     Future Appointments   Date Time Provider Marco Sellers   9/14/2022  9:45 AM Donna Amador [facility]   9/16/2022 10:30 AM Cindi Oneal [facility]   9/21/2022 10:30 AM Luciano Rolle [facility]   9/23/2022 10:30 AM Cindi Oneal [facility]   9/28/2022 10:30 AM Donna Amador [facility]   9/30/2022 10:30 AM Shahram, [address]

## 2022-09-14 ENCOUNTER — HOSPITAL ENCOUNTER (OUTPATIENT)
Dept: PHYSICAL THERAPY | Age: 52
Discharge: HOME | End: 2022-09-14
Attending: SPECIALIST
Payer: COMMERCIAL

## 2022-09-14 PROCEDURE — 97110 THERAPEUTIC EXERCISES: CPT

## 2022-09-14 PROCEDURE — 97530 THERAPEUTIC ACTIVITIES: CPT

## 2022-09-14 PROCEDURE — 97112 NEUROMUSCULAR REEDUCATION: CPT

## 2022-09-14 NOTE — PROGRESS NOTES
PT DAILY TREATMENT NOTE     Patient Name: Rj Walker  Date:2022  : 1970  [x]  Patient  Verified  Payor: Darin  / Plan: [address] / Product Type: Managed Care Medicaid /    In time:10:00  Out time:10:50  Total Treatment Time (min): 50  Visit #: 1 of 8    Medicare/BCBS Only   Total Timed Codes (min):   1:1 Treatment Time:  8       Treatment Area: Pain in left knee [M25.562]  Primary osteoarthritis of left knee [M17.12]    SUBJECTIVE  Pain Level (0-10 scale): 3  Any medication changes, allergies to medications, adverse drug reactions, diagnosis change, or new procedure performed?: [x] No    [] Yes (see summary sheet for update)  Subjective functional status/changes:   [] No changes reported  I still have popping in my knee    OBJECTIVE    Modality rationale: decrease pain to improve the patients ability to reduce post session pain   Min Type Additional Details    [] Estim:  []Unatt       []IFC  []Premod                        []Other:  []w/ice   []w/heat  Position:  Location:    [] Estim: []Att    []TENS instruct  []NMES                    []Other:  []w/US   []w/ice   []w/heat  Position:  Location:    []  Traction: [] Cervical       []Lumbar                       [] Prone          []Supine                       []Intermittent   []Continuous Lbs:  [] before manual  [] after manual    []  Ultrasound: []Continuous   [] Pulsed                           []1MHz   []3MHz W/cm2:  Location:    []  Iontophoresis with dexamethasone         Location: [] Take home patch   [] In clinic   10 [x]  Ice     []  heat  []  Ice massage  []  Laser   []  Anodyne Position: sitting  Location: left knee    []  Laser with stim  []  Other:  Position:  Location:    []  Vasopneumatic Device    []  Right     []  Left  Pre-treatment girth:  Post-treatment girth:  Measured at (location):  Pressure:       [] lo [] med [] hi   Temperature: [] lo [] med [] hi   [x] Skin assessment post-treatment: [x]intact []redness- no adverse reaction    []redness - adverse reaction:         20 min Therapeutic Exercise:  [] See flow sheet :   Rationale: increase ROM and increase strength to improve the patients ability to perform daily tasks, mobility    8 min Therapeutic Activity:  []  See flow sheet :   Rationale: increase ROM and increase strength  to improve the patients ability to increase knee stability for transfers and stairs     12 min Neuromuscular Re-education:  []  See flow sheet :   Rationale: increase strength, improve coordination, improve balance, and increase proprioception  to improve the patients ability to increase knee stability to improve safe stair negotiation          With   [] TE   [] TA   [] neuro   [] other: Patient Education: [x] Review HEP    [] Progressed/Changed HEP based on:   [] positioning   [] body mechanics   [] transfers   [] heat/ice application    [] other:      Other Objective/Functional Measures: ex's per card progressed to GTB for clams     Pain Level (0-10 scale) post treatment: 0    ASSESSMENT/Changes in Function: pt seen today to address Pain in left knee [M25.562], Primary osteoarthritis of left knee [M17.12]. Pt is progressing well toward goals with increased strength. Progressed to GTB for clams to increase hip strength. Patient will continue to benefit from skilled PT services to modify and progress therapeutic interventions, address functional mobility deficits, address ROM deficits, address strength deficits, analyze and address soft tissue restrictions, analyze and cue movement patterns, analyze and modify body mechanics/ergonomics, assess and modify postural abnormalities, address imbalance/dizziness, and instruct in home and community integration to attain remaining goals.      []  See Plan of Care  []  See progress note/recertification  []  See Discharge Summary         Progress towards goals / Updated goals:  Continue with above unmet goals      Goal: Pt to increase left knee AROM to 0 to 134 deg without increased pain for ease of squatting and stair negotiation. Status at last note/certification:  Knee AROM:  left: lacking 10 ext -114 deg of flexion   Current:   Goal: Pt to increase B hip ext, left hip ER strength to 4+/5 grossly to increase stability with gait and stair negotiation. Status at last note/certification:   Strength:    Right (/5) Left (/5)   Hip     Flexion 5 5             Abduction 5 5             Adduction 4+ 4             Extension 5 4             ER 5 4             IR 5 4+    Current:   Goal: Pt to perform SLS x 30 seconds to show improved single limb stability and work towards consistent independence with gait. Status at last note/certification:  SLS: Right: 25 sec Left: 9 sec (9/2/22) progressing    Current:   Goal: Pt to report < 3/10 pain at worst to increase ease with ADLs. Status at last note/certification:  Pain at worst in the last week: 3/10; on average: 4/10    Current:   Goal: Pt to report FOTO score of 72 pts to show improved function and quality of life. Status at last note/certification:  52 (0/7/28) regression, not met though patient does not report functional decline    Current:   Goal: Patient  will be independent  with comprehensive updated HEP to continue to manage care independently after discharge.    Status at last note/certification: new goal    Current:     PLAN  []  Upgrade activities as tolerated     []  Continue plan of care  []  Update interventions per flow sheet       []  Discharge due to:_  []  Other:_      Jas Espinoza [facility] 9/14/2022  10:02 AM    Future Appointments   Date Time Provider Marco Sellers   9/16/2022 10:30 AM Shantanu Kenney [facility]   9/21/2022 10:30 AM Lizeth Robles [facility]   9/23/2022 10:30 AM Shantanu Kenney [facility]   9/28/2022 10:30 AM Tramaine Blackmon [facility]   9/30/2022 10:30 AM Charmaine Omalley2 [address]

## 2022-09-16 ENCOUNTER — HOSPITAL ENCOUNTER (OUTPATIENT)
Dept: PHYSICAL THERAPY | Age: 52
Discharge: HOME | End: 2022-09-16
Attending: SPECIALIST
Payer: COMMERCIAL

## 2022-09-16 PROCEDURE — 97110 THERAPEUTIC EXERCISES: CPT

## 2022-09-16 PROCEDURE — 97112 NEUROMUSCULAR REEDUCATION: CPT

## 2022-09-16 PROCEDURE — 97014 ELECTRIC STIMULATION THERAPY: CPT

## 2022-09-16 PROCEDURE — 97530 THERAPEUTIC ACTIVITIES: CPT

## 2022-09-16 NOTE — PROGRESS NOTES
PT DAILY TREATMENT NOTE     Patient Name: Dennis Verdugo  Date:2022  : 1970  [x]  Patient  Verified  Payor: Darin 22 / Plan: [address] / Product Type: Managed Care Medicaid /    In time:10:30  Out time:11:25  Total Treatment Time (min): 54  Visit #: 2 of 8    Medicare/BCBS Only   Total Timed Codes (min):   1:1 Treatment Time:         Treatment Area: Pain in left knee [M25.562]  Primary osteoarthritis of left knee [M17.12]    SUBJECTIVE  Pain Level (0-10 scale): 4  Any medication changes, allergies to medications, adverse drug reactions, diagnosis change, or new procedure performed?: [x] No    [] Yes (see summary sheet for update)  Subjective functional status/changes:   [] No changes reported  My knee is bothering me today, but I'm set to see my doctor soon. OBJECTIVE    Modality rationale: decrease inflammation and decrease pain to improve the patients ability to stand for longer periods of time. Min Type Additional Details   10 [x] Estim:  []Unatt       [x]IFC  []Premod                        []Other:  [x]w/ice   []w/heat  Position: seated   Location: left knee.      [] Estim: []Att    []TENS instruct  []NMES                    []Other:  []w/US   []w/ice   []w/heat  Position:  Location:    []  Traction: [] Cervical       []Lumbar                       [] Prone          []Supine                       []Intermittent   []Continuous Lbs:  [] before manual  [] after manual    []  Ultrasound: []Continuous   [] Pulsed                           []1MHz   []3MHz W/cm2:  Location:    []  Iontophoresis with dexamethasone         Location: [] Take home patch   [] In clinic    []  Ice     []  heat  []  Ice massage  []  Laser   []  Anodyne Position:  Location:    []  Laser with stim  []  Other:  Position:  Location:    []  Vasopneumatic Device    []  Right     []  Left  Pre-treatment girth:  Post-treatment girth:  Measured at (location):  Pressure:       [] lo [] med [] hi   Temperature: [] lo [] med [] hi   [x] Skin assessment post-treatment:  [x]intact []redness- no adverse reaction    []redness - adverse reaction:     12 min Therapeutic Exercise:  [] See flow sheet :   Rationale: increase ROM and increase strength to improve the patients ability to bend lift and squat. 25 min Therapeutic Activity:  []  See flow sheet :   Rationale: increase strength, improve coordination, and improve balance  to improve the patients ability to negotiate uneven terrain. 8 min Neuromuscular Re-education:  []  See flow sheet :   Rationale: improve coordination, improve balance, and increase proprioception  to improve the patients ability to maintain balance in SLS        With   [x] TE   [x] TA   [x] neuro   [] other: Patient Education: [x] Review HEP    [] Progressed/Changed HEP based on:   [] positioning   [] body mechanics   [] transfers   [] heat/ice application    [] other:      Other Objective/Functional Measures: exercises per chart. Pain Level (0-10 scale) post treatment: 0    ASSESSMENT/Changes in Function: Pt reports to skilled therapy session with decreased functional strength and ROM in the right LE. Pt noted minor increases in pain during eccentric step downs, but noted improvement with rest. Pt has archived one of his LTGs and was able to perform a SLS on the left LE for 30 seconds. Pt tolerated the addition of VMO ball squeezes and LAQs to decrease knee pain at rest. Tactile cues were provided during quad-set SLRs to improve knee extensions and improve gait pattern. Session tolerated well and concluded with E-stim to alleviate symptoms.       Patient will continue to benefit from skilled PT services to modify and progress therapeutic interventions, address functional mobility deficits, address ROM deficits, address strength deficits, analyze and address soft tissue restrictions, analyze and cue movement patterns, analyze and modify body mechanics/ergonomics, and assess and modify postural abnormalities to attain remaining goals. [x]  See Plan of Care  []  See progress note/recertification  []  See Discharge Summary         Progress towards goals / Updated goals:  Goal: Pt to increase left knee AROM to 0 to 134 deg without increased pain for ease of squatting and stair negotiation. Status at last note/certification:  Knee AROM:  left: lacking 10 ext -114 deg of flexion   Current:   Goal: Pt to increase B hip ext, left hip ER strength to 4+/5 grossly to increase stability with gait and stair negotiation. Status at last note/certification:   Strength:    Right (/5) Left (/5)   Hip     Flexion 5 5             Abduction 5 5             Adduction 4+ 4             Extension 5 4             ER 5 4             IR 5 4+    Current:   Goal: Pt to perform SLS x 30 seconds to show improved single limb stability and work towards consistent independence with gait. Status at last note/certification:  SLS: Right: 25 sec Left: 9 sec (9/2/22) progressing    Current: MET: 30 seconds, bilateral (9/16/2022)  Goal: Pt to report < 3/10 pain at worst to increase ease with ADLs. Status at last note/certification:  Pain at worst in the last week: 3/10; on average: 4/10    Current:   Goal: Pt to report FOTO score of 72 pts to show improved function and quality of life. Status at last note/certification:  52 (1/8/21) regression, not met though patient does not report functional decline    Current:   Goal: Patient  will be independent  with comprehensive updated HEP to continue to manage care independently after discharge.    Status at last note/certification: new goal    Current:     PLAN  [x]  Upgrade activities as tolerated     [x]  Continue plan of care  []  Update interventions per flow sheet       []  Discharge due to:_  []  Other:_      Baldomero Paulson, MADELINE 9/16/2022  10:35 AM    Future Appointments   Date Time Provider Marco Sellers   9/21/2022 10:30 [facility] 9/23/2022 10:30 AM Elizabeth Michelle [facility]   9/28/2022 10:30 AM [facility]   9/30/2022 10:30 AM Shahrma, [address]

## 2022-09-20 ENCOUNTER — DOCUMENTATION ONLY (OUTPATIENT)
Dept: ORTHOPEDIC SURGERY | Age: 52
End: 2022-09-20

## 2022-09-21 ENCOUNTER — HOSPITAL ENCOUNTER (OUTPATIENT)
Dept: PHYSICAL THERAPY | Age: 52
Discharge: HOME | End: 2022-09-21
Attending: SPECIALIST
Payer: COMMERCIAL

## 2022-09-21 PROCEDURE — 97530 THERAPEUTIC ACTIVITIES: CPT

## 2022-09-21 PROCEDURE — 97110 THERAPEUTIC EXERCISES: CPT

## 2022-09-21 PROCEDURE — 97032 APPL MODALITY 1+ESTIM EA 15: CPT

## 2022-09-21 PROCEDURE — 97112 NEUROMUSCULAR REEDUCATION: CPT

## 2022-09-21 NOTE — PROGRESS NOTES
PT DAILY TREATMENT NOTE     Patient Name: Papo Hahn  Date:2022  : 1970  [x]  Patient  Verified  Payor: Darin  / Plan: [address] / Product Type: Managed Care Medicaid /    In time:1030  Out time:1120  Total Treatment Time (min): 40  Visit #: 3 of 8    Medicare/BCBS Only   Total Timed Codes (min):  40 1:1 Treatment Time:  40       Treatment Area: Pain in left knee [M25.562]  Primary osteoarthritis of left knee [M17.12]    SUBJECTIVE  Pain Level (0-10 scale): 3/10  Any medication changes, allergies to medications, adverse drug reactions, diagnosis change, or new procedure performed?: [x] No    [] Yes (see summary sheet for update)  Subjective functional status/changes:   [] No changes reported  Pt presents with questions regarding his next steps following therapy     OBJECTIVE    Modality rationale: decrease edema, decrease inflammation, and decrease pain to improve the patients ability to tolerate functional mobility   Min Type Additional Details   10 [x] Estim:  []Unatt       [x]IFC  []Premod                        []Other:  []w/ice   [x]w/heat  Position:supine  Location:left knee    [] Estim: []Att    []TENS instruct  []NMES                    []Other:  []w/US   []w/ice   []w/heat  Position:  Location:    []  Traction: [] Cervical       []Lumbar                       [] Prone          []Supine                       []Intermittent   []Continuous Lbs:  [] before manual  [] after manual    []  Ultrasound: []Continuous   [] Pulsed                           []1MHz   []3MHz W/cm2:  Location:    []  Iontophoresis with dexamethasone         Location: [] Take home patch   [] In clinic    []  Ice     []  heat  []  Ice massage  []  Laser   []  Anodyne Position:  Location:    []  Laser with stim  []  Other:  Position:  Location:    []  Vasopneumatic Device    []  Right     []  Left  Pre-treatment girth:  Post-treatment girth:  Measured at (location):  Pressure: [] lo [] med [] hi   Temperature: [] lo [] med [] hi   [] Skin assessment post-treatment:  []intact []redness- no adverse reaction    []redness - adverse reaction:     15 min Therapeutic Exercise:  [] See flow sheet :   Rationale: increase ROM, increase strength, improve coordination, and improve balance to improve the patients ability to tolerate functional mobility and daily routine. 15 min Therapeutic Activity:  []  See flow sheet :   Rationale: increase strength, improve coordination, improve balance, and increase proprioception  to improve the patients ability to tolerate ADLs, pt educated on next steps following PT progression. 10 min Neuromuscular Re-education:  []  See flow sheet :   Rationale: increase strength, improve coordination, improve balance, and increase proprioception  to improve the patients ability to tolerate proper muscle activation of core and hip muscles. With   [] TE   [] TA   [] neuro   [] other: Patient Education: [x] Review HEP    [] Progressed/Changed HEP based on:   [] positioning   [] body mechanics   [] transfers   [] heat/ice application    [] other:      Pain Level (0-10 scale) post treatment: 0/10    ASSESSMENT/Changes in Function: Pt seen by PT to address Left knee pain, strength, ROM, and functional mobility. Pt with good tolerance of the session with minimal to no lasting increase in pain or discomfort. Pt challenged with hip strengthening exercises secondary to weakness. PT provided intermittent verbal/tactile cues for optimal form and alignment.      Patient will continue to benefit from skilled PT services to modify and progress therapeutic interventions, address functional mobility deficits, address ROM deficits, address strength deficits, analyze and address soft tissue restrictions, analyze and cue movement patterns, analyze and modify body mechanics/ergonomics, assess and modify postural abnormalities, address imbalance/dizziness, and instruct in home and community integration to attain remaining goals. []  See Plan of Care  []  See progress note/recertification  []  See Discharge Summary         Progress towards goals / Updated goals:  Goal: Pt to increase left knee AROM to 0 to 134 deg without increased pain for ease of squatting and stair negotiation. Status at last note/certification:  Knee AROM:  left: lacking 10 ext -114 deg of flexion   Current:   Goal: Pt to increase B hip ext, left hip ER strength to 4+/5 grossly to increase stability with gait and stair negotiation. Status at last note/certification:   Strength:    Right (/5) Left (/5)   Hip     Flexion 5 5             Abduction 5 5             Adduction 4+ 4             Extension 5 4             ER 5 4             IR 5 4+    Current:   Goal: Pt to perform SLS x 30 seconds to show improved single limb stability and work towards consistent independence with gait. Status at last note/certification:  SLS: Right: 25 sec Left: 9 sec (9/2/22) progressing    Current: MET: 30 seconds, bilateral (9/16/2022)  Goal: Pt to report < 3/10 pain at worst to increase ease with ADLs. Status at last note/certification:  Pain at worst in the last week: 3/10; on average: 4/10    Current:   Goal: Pt to report FOTO score of 72 pts to show improved function and quality of life. Status at last note/certification:  52 (7/9/18) regression, not met though patient does not report functional decline    Current:   Goal: Patient  will be independent  with comprehensive updated HEP to continue to manage care independently after discharge.    Status at last note/certification: new goal    Current:     PLAN  [x]  Upgrade activities as tolerated     [x]  Continue plan of care  []  Update interventions per flow sheet       []  Discharge due to:_  []  Other:_      Carla Lopez, PT 9/21/2022  10:30 AM    Future Appointments   Date Time Provider Marco Sellers   9/23/2022 10:30 AM Isael Waddell PTA [facility] 9/28/2022 10:30 AM Shahram, [address]   9/30/2022 10:30 AM Shahram, [address] [place] [address]

## 2022-09-23 ENCOUNTER — HOSPITAL ENCOUNTER (OUTPATIENT)
Dept: PHYSICAL THERAPY | Age: 52
Discharge: HOME | End: 2022-09-23
Attending: SPECIALIST
Payer: COMMERCIAL

## 2022-09-23 PROCEDURE — 97110 THERAPEUTIC EXERCISES: CPT

## 2022-09-23 PROCEDURE — 97530 THERAPEUTIC ACTIVITIES: CPT

## 2022-09-23 PROCEDURE — 97014 ELECTRIC STIMULATION THERAPY: CPT

## 2022-09-23 PROCEDURE — 97112 NEUROMUSCULAR REEDUCATION: CPT

## 2022-09-23 NOTE — PROGRESS NOTES
PT DAILY TREATMENT NOTE     Patient Name: Omar Reardon  Date:2022  : 1970  [x]  Patient  Verified  Payor: Darin 22 / Plan: [address] / Product Type: Managed Care Medicaid /    In time:10:32  Out time:11:24  Total Treatment Time (min): 48  Visit #: 4 of 8    Medicare/BCBS Only   Total Timed Codes (min):   1:1 Treatment Time:         Treatment Area: Pain in left knee [M25.562]  Primary osteoarthritis of left knee [M17.12]    SUBJECTIVE  Pain Level (0-10 scale): 3  Any medication changes, allergies to medications, adverse drug reactions, diagnosis change, or new procedure performed?: [x] No    [] Yes (see summary sheet for update)  Subjective functional status/changes:   [] No changes reported  I can tell I'm getting stronger I'm starting to go up the stairs without my cane. OBJECTIVE    Modality rationale: decrease inflammation and decrease pain to improve the patients ability to perform transfers with greater ease. Min Type Additional Details   10 [x] Estim:  []Unatt       [x]IFC  []Premod                        []Other:  [x]w/ice   []w/heat  Position: reclined  Location: left knee.      [] Estim: []Att    []TENS instruct  []NMES                    []Other:  []w/US   []w/ice   []w/heat  Position:  Location:    []  Traction: [] Cervical       []Lumbar                       [] Prone          []Supine                       []Intermittent   []Continuous Lbs:  [] before manual  [] after manual    []  Ultrasound: []Continuous   [] Pulsed                           []1MHz   []3MHz W/cm2:  Location:    []  Iontophoresis with dexamethasone         Location: [] Take home patch   [] In clinic    []  Ice     []  heat  []  Ice massage  []  Laser   []  Anodyne Position:  Location:    []  Laser with stim  []  Other:  Position:  Location:    []  Vasopneumatic Device    []  Right     []  Left  Pre-treatment girth:  Post-treatment girth:  Measured at (location):  Pressure: [] lo [] med [] hi   Temperature: [] lo [] med [] hi   [x] Skin assessment post-treatment:  []intact []redness- no adverse reaction    []redness - adverse reaction:       15 min Therapeutic Exercise:  [] See flow sheet :   Rationale: increase ROM and increase strength to improve the patients ability to bend, lift and     13 min Therapeutic Activity:  []  See flow sheet :   Rationale: increase strength, improve coordination, and improve balance  to improve the patients ability to ambulate longer distances. 15 min Neuromuscular Re-education:  []  See flow sheet :   Rationale: improve coordination, improve balance, and increase proprioception  to improve the patients ability to maintain balance in SLS and decrease the risks of falls         With   [x] TE   [x] TA   [x] neuro   [] other: Patient Education: [x] Review HEP    [] Progressed/Changed HEP based on:   [] positioning   [] body mechanics   [] transfers   [] heat/ice application    [] other:      Other Objective/Functional Measures: exercises per chart. Pain Level (0-10 scale) post treatment: 4    ASSESSMENT/Changes in Function: Pt reports to skilled therapy session with decreased functional strength and ROM in the left LE. Pt was able to increase resistance during monster walks and hip 3 ways to improve ease with negotiating uneven terrain. He tolerated the addition of bosu- lunges to improve ease with negotiating uneven terrain and decrease the risks of falls. Pt is making progress towards his goals, as shown by his increased AROM in his left knee. Session tolerated well with only minor increases in pain, and concluded with E-stim and cold pack to the left knee.       Patient will continue to benefit from skilled PT services to modify and progress therapeutic interventions, address functional mobility deficits, address ROM deficits, address strength deficits, analyze and address soft tissue restrictions, analyze and cue movement patterns, analyze and modify body mechanics/ergonomics, and assess and modify postural abnormalities to attain remaining goals. []  See Plan of Care  []  See progress note/recertification  []  See Discharge Summary         Progress towards goals / Updated goals:  Goal: Pt to increase left knee AROM to 0 to 134 deg without increased pain for ease of squatting and stair negotiation. Status at last note/certification:  Knee AROM:  left: lacking 10 ext -114 deg of flexion   Current: left -6- 118 AROM (progressing)  Goal: Pt to increase B hip ext, left hip ER strength to 4+/5 grossly to increase stability with gait and stair negotiation. Status at last note/certification:   Strength:    Right (/5) Left (/5)   Hip     Flexion 5 5             Abduction 5 5             Adduction 4+ 4             Extension 5 4             ER 5 4             IR 5 4+    Current:   Goal: Pt to perform SLS x 30 seconds to show improved single limb stability and work towards consistent independence with gait. Status at last note/certification:  SLS: Right: 25 sec Left: 9 sec (9/2/22) progressing    Current: MET: 30 seconds, bilateral (9/16/2022)  Goal: Pt to report < 3/10 pain at worst to increase ease with ADLs. Status at last note/certification:  Pain at worst in the last week: 3/10; on average: 4/10    Current:   Goal: Pt to report FOTO score of 72 pts to show improved function and quality of life. Status at last note/certification:  52 (8/8/58) regression, not met though patient does not report functional decline    Current:   Goal: Patient  will be independent  with comprehensive updated HEP to continue to manage care independently after discharge.    Status at last note/certification: new goal    Current:     PLAN  [x]  Upgrade activities as tolerated     [x]  Continue plan of care  []  Update interventions per flow sheet       []  Discharge due to:_  []  Other:_      Armani Robles, PTA 9/23/2022  10:41 AM    Future Appointments   Date Time Provider Marco Sellers   9/28/2022 10:30 AM [facility]   9/30/2022 10:30 AM [facility]

## 2022-09-28 ENCOUNTER — HOSPITAL ENCOUNTER (OUTPATIENT)
Dept: PHYSICAL THERAPY | Age: 52
Discharge: HOME | End: 2022-09-28
Attending: SPECIALIST
Payer: COMMERCIAL

## 2022-09-28 PROCEDURE — 97530 THERAPEUTIC ACTIVITIES: CPT

## 2022-09-28 PROCEDURE — 97110 THERAPEUTIC EXERCISES: CPT

## 2022-09-28 PROCEDURE — 97112 NEUROMUSCULAR REEDUCATION: CPT

## 2022-09-28 NOTE — PROGRESS NOTES
PT DAILY TREATMENT NOTE     Patient Name: Partha Blanc  Date:2022  : 1970  [x]  Patient  Verified  Payor: Darin 22 / Plan: [address] / Product Type: Managed Care Medicaid /    In time:10:30  Out time:11:30  Total Treatment Time (min): 60  Visit #: 5 of 8    Medicare/BCBS Only   Total Timed Codes (min):   1:1 Treatment Time:         Treatment Area: Pain in left knee [M25.562]  Primary osteoarthritis of left knee [M17.12]    SUBJECTIVE  Pain Level (0-10 scale): 3  Any medication changes, allergies to medications, adverse drug reactions, diagnosis change, or new procedure performed?: [x] No    [] Yes (see summary sheet for update)  Subjective functional status/changes:   [] No changes reported  I'm feeling tired  But I'm doing a fast     OBJECTIVE    Modality rationale: decrease pain to improve the patients ability to reduce pain    Min Type Additional Details   10 [x] Estim:  [x]Unatt       [x]IFC  []Premod                        []Other:  [x]w/ice   []w/heat  Position: sitting  Location: left knee    [] Estim: []Att    []TENS instruct  []NMES                    []Other:  []w/US   []w/ice   []w/heat  Position:  Location:    []  Traction: [] Cervical       []Lumbar                       [] Prone          []Supine                       []Intermittent   []Continuous Lbs:  [] before manual  [] after manual    []  Ultrasound: []Continuous   [] Pulsed                           []1MHz   []3MHz W/cm2:  Location:    []  Iontophoresis with dexamethasone         Location: [] Take home patch   [] In clinic    []  Ice     []  heat  []  Ice massage  []  Laser   []  Anodyne Position:  Location:    []  Laser with stim  []  Other:  Position:  Location:    []  Vasopneumatic Device    []  Right     []  Left  Pre-treatment girth:  Post-treatment girth:  Measured at (location):  Pressure:       [] lo [] med [] hi   Temperature: [] lo [] med [] hi   [x] Skin assessment post-treatment: [x]intact []redness- no adverse reaction    []redness - adverse reaction:         25 min Therapeutic Exercise:  [] See flow sheet :   Rationale: increase ROM and increase strength to improve the patients ability to perform daily tasks, walking,    10 min Therapeutic Activity:  []  See flow sheet :   Rationale: increase strength and improve coordination  to improve the patients ability to increase walking duration     15 min Neuromuscular Re-education:  []  See flow sheet :   Rationale: increase strength, improve coordination, and improve balance  to improve the patients ability to increase knee stability          With   [] TE   [] TA   [] neuro   [] other: Patient Education: [x] Review HEP    [] Progressed/Changed HEP based on:   [] positioning   [] body mechanics   [] transfers   [] heat/ice application    [] other:      Other Objective/Functional Measures: ex's per card     Pain Level (0-10 scale) post treatment: 4    ASSESSMENT/Changes in Function: pt reports decreased energy level on arrival, he admits to doing a \"fast\", which likely contributing to this. Despite fatigue, he was still able to perform all ex's as indicated. He reports frustration with process through insurance to gel injections to get approved. Still limited with ROM pain that impacts mobility and stairs. Patient will continue to benefit from skilled PT services to modify and progress therapeutic interventions, address functional mobility deficits, address ROM deficits, address strength deficits, analyze and address soft tissue restrictions, analyze and cue movement patterns, analyze and modify body mechanics/ergonomics, assess and modify postural abnormalities, address imbalance/dizziness, and instruct in home and community integration to attain remaining goals.      []  See Plan of Care  []  See progress note/recertification  []  See Discharge Summary         Progress towards goals / Updated goals:  Goal: Pt to increase left knee AROM to 0 to 134 deg without increased pain for ease of squatting and stair negotiation. Status at last note/certification:  Knee AROM:  left: lacking 10 ext -114 deg of flexion   Current: left -6- 118 AROM (progressing)  Goal: Pt to increase B hip ext, left hip ER strength to 4+/5 grossly to increase stability with gait and stair negotiation. Status at last note/certification:   Strength:    Right (/5) Left (/5)   Hip     Flexion 5 5             Abduction 5 5             Adduction 4+ 4             Extension 5 4             ER 5 4             IR 5 4+    Current:   Goal: Pt to perform SLS x 30 seconds to show improved single limb stability and work towards consistent independence with gait. Status at last note/certification:  SLS: Right: 25 sec Left: 9 sec (9/2/22) progressing    Current: MET: 30 seconds, bilateral (9/16/2022)  Goal: Pt to report < 3/10 pain at worst to increase ease with ADLs. Status at last note/certification:  Pain at worst in the last week: 3/10; on average: 4/10    Current:   Goal: Pt to report FOTO score of 72 pts to show improved function and quality of life. Status at last note/certification:  52 (9/4/65) regression, not met though patient does not report functional decline    Current:   Goal: Patient  will be independent  with comprehensive updated HEP to continue to manage care independently after discharge.    Status at last note/certification: new goal    Current:        PLAN  [x]  Upgrade activities as tolerated     []  Continue plan of care  []  Update interventions per flow sheet       []  Discharge due to:_  []  Other:_      Mirta Howard PTA 9/28/2022  10:36 AM    Future Appointments   Date Time Provider Marco Sellers   9/30/2022 10:30 AM Jerald Sutton

## 2022-09-30 ENCOUNTER — HOSPITAL ENCOUNTER (OUTPATIENT)
Dept: PHYSICAL THERAPY | Age: 52
Discharge: HOME | End: 2022-09-30
Attending: SPECIALIST
Payer: COMMERCIAL

## 2022-09-30 PROCEDURE — 97530 THERAPEUTIC ACTIVITIES: CPT

## 2022-09-30 PROCEDURE — 97110 THERAPEUTIC EXERCISES: CPT

## 2022-09-30 PROCEDURE — 97112 NEUROMUSCULAR REEDUCATION: CPT

## 2022-09-30 NOTE — PROGRESS NOTES
PT DAILY TREATMENT NOTE     Patient Name: Kin Confer  Date:2022  : 1970  [x]  Patient  Verified  Payor: Darin Jeter / Plan: [address] / Product Type: Managed Care Medicaid /    In time:10:34  Out time:11:35  Total Treatment Time (min): 61  Visit #: 6 of 8    Medicare/BCBS Only   Total Timed Codes (min):   1:1 Treatment Time:         Treatment Area: Pain in left knee [M25.562]  Primary osteoarthritis of left knee [M17.12]    SUBJECTIVE  Pain Level (0-10 scale): 3  Any medication changes, allergies to medications, adverse drug reactions, diagnosis change, or new procedure performed?: [x] No    [] Yes (see summary sheet for update)  Subjective functional status/changes:   [] No changes reported      OBJECTIVE    Modality rationale: decrease edema and decrease pain to improve the patients ability to perform daily tasks   Min Type Additional Details   10 [x] Estim:  [x]Unatt       [x]IFC  []Premod                        []Other:  [x]w/ice   []w/heat  Position:sitting  Location: left knee    [] Estim: []Att    []TENS instruct  []NMES                    []Other:  []w/US   []w/ice   []w/heat  Position:  Location:    []  Traction: [] Cervical       []Lumbar                       [] Prone          []Supine                       []Intermittent   []Continuous Lbs:  [] before manual  [] after manual    []  Ultrasound: []Continuous   [] Pulsed                           []1MHz   []3MHz W/cm2:  Location:    []  Iontophoresis with dexamethasone         Location: [] Take home patch   [] In clinic    []  Ice     []  heat  []  Ice massage  []  Laser   []  Anodyne Position:  Location:    []  Laser with stim  []  Other:  Position:  Location:    []  Vasopneumatic Device    []  Right     []  Left  Pre-treatment girth:  Post-treatment girth:  Measured at (location):  Pressure:       [] lo [] med [] hi   Temperature: [] lo [] med [] hi   [x] Skin assessment post-treatment:  [x]intact []redness- no adverse reaction    []redness - adverse reaction:         19 min Therapeutic Exercise:  [] See flow sheet :   Rationale: increase ROM and increase strength to improve the patients ability to perform walking, daily tasks    10 min Therapeutic Activity:  []  See flow sheet :   Rationale: increase strength and improve coordination  to improve the patients ability to improve walking duration, function     20 min Neuromuscular Re-education:  []  See flow sheet :   Rationale: increase strength, improve coordination, and increase proprioception  to improve the patients ability to increase knee stability, function    With   [] TE   [] TA   [] neuro   [] other: Patient Education: [x] Review HEP    [] Progressed/Changed HEP based on:   [] positioning   [] body mechanics   [] transfers   [] heat/ice application    [] other:      Other Objective/Functional Measures: ex's per card     Pain Level (0-10 scale) post treatment: 3    ASSESSMENT/Changes in Function: pt seen today to address Pain in left knee [M25.562] Primary osteoarthritis of left knee [M17.12] Able to perform all ex's without symptom increase. .    Patient will continue to benefit from skilled PT services to modify and progress therapeutic interventions, address functional mobility deficits, address ROM deficits, address strength deficits, analyze and address soft tissue restrictions, analyze and cue movement patterns, analyze and modify body mechanics/ergonomics, assess and modify postural abnormalities, address imbalance/dizziness, and instruct in home and community integration to attain remaining goals. []  See Plan of Care  []  See progress note/recertification  []  See Discharge Summary         Progress towards goals / Updated goals:  Goal: Pt to increase left knee AROM to 0 to 134 deg without increased pain for ease of squatting and stair negotiation.   Status at last note/certification:  Knee AROM:  left: lacking 10 ext -114 deg of flexion   Current: left -6- 118 AROM (progressing)  Goal: Pt to increase B hip ext, left hip ER strength to 4+/5 grossly to increase stability with gait and stair negotiation. Status at last note/certification:   Strength:    Right (/5) Left (/5)   Hip     Flexion 5 5             Abduction 5 5             Adduction 4+ 4             Extension 5 4             ER 5 4             IR 5 4+    Current:   Goal: Pt to perform SLS x 30 seconds to show improved single limb stability and work towards consistent independence with gait. Status at last note/certification:  SLS: Right: 25 sec Left: 9 sec (9/2/22) progressing    Current: MET: 30 seconds, bilateral (9/16/2022)  Goal: Pt to report < 3/10 pain at worst to increase ease with ADLs. Status at last note/certification:  Pain at worst in the last week: 3/10; on average: 4/10    Current:   Goal: Pt to report FOTO score of 72 pts to show improved function and quality of life. Status at last note/certification:  52 (5/2/99) regression, not met though patient does not report functional decline    Current:   Goal: Patient  will be independent  with comprehensive updated HEP to continue to manage care independently after discharge. Status at last note/certification: new goal    Current:        PLAN  []  Upgrade activities as tolerated     []  Continue plan of care  []  Update interventions per flow sheet       []  Discharge due to:_  []  Other:_      Heather Childers, MADELINE 9/30/2022  11:22 AM    No future appointments.

## 2022-10-12 ENCOUNTER — HOSPITAL ENCOUNTER (OUTPATIENT)
Dept: PHYSICAL THERAPY | Age: 52
Discharge: HOME | End: 2022-10-12
Attending: SPECIALIST
Payer: COMMERCIAL

## 2022-10-12 PROCEDURE — 97110 THERAPEUTIC EXERCISES: CPT

## 2022-10-12 PROCEDURE — 97530 THERAPEUTIC ACTIVITIES: CPT

## 2022-10-12 PROCEDURE — 97112 NEUROMUSCULAR REEDUCATION: CPT

## 2022-10-12 NOTE — PROGRESS NOTES
PT DAILY TREATMENT NOTE     Patient Name: Corinna Hernandez  Date:10/12/2022  : 1970  [x]  Patient  Verified  Payor: Darin 22 / Plan: [address] / Product Type: Managed Care Medicaid /    In time:10:32  Out time:11:20  Total Treatment Time (min): 48  Visit #: 1 of 8    Medicare/BCBS Only   Total Timed Codes (min):   1:1 Treatment Time:         Treatment Area: Pain in left knee [M25.562]  Primary osteoarthritis of left knee [M17.12]    SUBJECTIVE  Pain Level (0-10 scale): 3  Any medication changes, allergies to medications, adverse drug reactions, diagnosis change, or new procedure performed?: [x] No    [] Yes (see summary sheet for update)  Subjective functional status/changes:   [] No changes reported  My knee feels really sore and stiff. OBJECTIVE    Modality rationale: decrease inflammation and decrease pain to improve the patients ability to perform ADLs for longer. Min Type Additional Details   5 [x] Estim:  []Unatt       [x]IFC  []Premod                        []Other:  [x]w/ice   []w/heat  Position: reclined   Location: left knee.      [] Estim: []Att    []TENS instruct  []NMES                    []Other:  []w/US   []w/ice   []w/heat  Position:  Location:    []  Traction: [] Cervical       []Lumbar                       [] Prone          []Supine                       []Intermittent   []Continuous Lbs:  [] before manual  [] after manual    []  Ultrasound: []Continuous   [] Pulsed                           []1MHz   []3MHz W/cm2:  Location:    []  Iontophoresis with dexamethasone         Location: [] Take home patch   [] In clinic    []  Ice     []  heat  []  Ice massage  []  Laser   []  Anodyne Position:  Location:    []  Laser with stim  []  Other:  Position:  Location:    []  Vasopneumatic Device    []  Right     []  Left  Pre-treatment girth:  Post-treatment girth:  Measured at (location):  Pressure:       [] lo [] med [] hi   Temperature: [] lo [] med [] hi [x] Skin assessment post-treatment:  [x]intact []redness- no adverse reaction    []redness - adverse reaction:     10 min Therapeutic Exercise:  [] See flow sheet :   Rationale: increase ROM and increase strength to improve the patients ability to bend, lift and squat. 20 min Therapeutic Activity:  []  See flow sheet :   Rationale: increase strength, improve coordination, and improve balance  to improve the patients ability to ambulate longer distances. 13 min Neuromuscular Re-education:  []  See flow sheet :   Rationale: improve coordination, improve balance, and increase proprioception  to improve the patients ability to maintain balance when negotiating stairs. With   [x] TE   [x] TA   [x] neuro   [] other: Patient Education: [x] Review HEP    [] Progressed/Changed HEP based on:   [] positioning   [] body mechanics   [] transfers   [] heat/ice application    [] other:      Other Objective/Functional Measures: exercises per chart. Pain Level (0-10 scale) post treatment: 3    ASSESSMENT/Changes in Function: Pt reports to skilled therapy session with decreased functional strength and ROM in the left  LE. Pt is making progress towards his LTGs and improve both his objective LE strength and ROM measurements. Pt received new HEP exercises and verbalized understanding of exercise frequency and performance. Visual cues were provided during monster walks with Pt demonstrating proper technique following instruction. Session tolerated well with only minor increases in pain. Patient will continue to benefit from skilled PT services to modify and progress therapeutic interventions, address functional mobility deficits, address ROM deficits, address strength deficits, analyze and address soft tissue restrictions, analyze and cue movement patterns, analyze and modify body mechanics/ergonomics, and assess and modify postural abnormalities to attain remaining goals.      [x]  See Plan of Care  [] See progress note/recertification  []  See Discharge Summary         Progress towards goals / Updated goals:  Goal: Pt to increase left knee AROM to 0 to 134 deg without increased pain for ease of squatting and stair negotiation. Status at last note/certification:  Knee AROM:  left: lacking 10 ext -114 deg of flexion   Current: left -5- 120 AROM (progressing) (10/12/2022)  Goal: Pt to increase B hip ext, left hip ER strength to 4+/5 grossly to increase stability with gait and stair negotiation. Status at last note/certification:   Strength:    Right (/5) Left (/5)   Hip     Flexion 5 5             Abduction 5 5             Adduction 4+ 4             Extension 5 4             ER 5 4             IR 5 4+    Current: not met: progressing: (10/12/2022)    Right (/5) Left (/5)   Hip     Flexion 5 5             Abduction 5 5             Adduction 5 5             Extension 5 5             ER 5 4             IR 5 4+      Goal: Pt to perform SLS x 30 seconds to show improved single limb stability and work towards consistent independence with gait. Status at last note/certification:  SLS: Right: 25 sec Left: 9 sec (9/2/22) progressing    Current: MET: 30 seconds, bilateral (9/16/2022)  Goal: Pt to report < 3/10 pain at worst to increase ease with ADLs. Status at last note/certification:  Pain at worst in the last week: 3/10; on average: 4/10    Current: Pain 8/10- when performing yard work. (10/12/2022)  Goal: Pt to report FOTO score of 72 pts to show improved function and quality of life. Status at last note/certification:  52 (0/2/70) regression, not met though patient does not report functional decline    Current:  FOTO 65 points: (10/12/2022)  Goal: Patient  will be independent  with comprehensive updated HEP to continue to manage care independently after discharge.    Status at last note/certification: new goal    Current: Progressing: pt received new HEP     PLAN  [x]  Upgrade activities as tolerated     [x] Continue plan of care  []  Update interventions per flow sheet       []  Discharge due to:_  []  Other:_      Hunter Holt [facility] 10/12/2022  11:34 AM    Future Appointments   Date Time Provider Marco Sellers   10/17/2022 10:30 AM Barbara [facility]   10/19/2022 10:30 AM Barbara Andrews [facility]   [facility]2022 [facility]   10/26/2022 10:30 AM Barbara Andrews [facility]   10/31/2022 10:30 AM Harmeet Darby [facility]   11/2/2022 10:30 AM Barbara Andrews [facility]

## 2022-10-12 NOTE — PROGRESS NOTES
[facility] - [facility], [address] [place] [address]  [phone] fax    Progress Note  Patient name: Bartolo uHerta Start of Care: 22   Referral source: Isa Bonilla MD : 1970   Medical/Treatment Diagnosis: Pain in left knee [M25.562]  Primary osteoarthritis of left knee [M17.12]  Payor: Drimki / Plan: [address] / Product Type: Managed Care Medicaid /  Onset Date:22     Prior Hospitalization: see medical history Provider#: [id number]   Medications: Verified on Patient Summary List    Comorbidities: Arthritis; BMI > 30  Prior Level of Function:In [employer] school to become ; lives in [facility] home with jackelyn Montague; use of SPC in home, community    Visits from [facility]: 12    Missed Visits: 0    Goal: Pt to increase left knee AROM to 0 to 134 deg without increased pain for ease of squatting and stair negotiation. Status at last note/certification:  Knee AROM:  left: lacking 10 ext -114 deg of flexion   Current: progressing - left -5-120 deg AROM (10/12/2022)  Goal: Pt to increase B hip ext, left hip ER strength to 4+/5 grossly to increase stability with gait and stair negotiation. Status at last note/certification:   Strength:    Right (/5) Left (/5)   Hip     Flexion 5 5             Abduction 5 5             Adduction 4+ 4             Extension 5 4             ER 5 4             IR 5 4+    Current: progressing: (10/12/2022)    Right (/5) Left (/5)   Hip     Flexion 5 5             Abduction 5 5             Adduction 5 5             Extension 5 5             ER 5 4             IR 5 4+     Goal: Pt to perform SLS x 30 seconds to show improved single limb stability and work towards consistent independence with gait.   Status at last note/certification:  SLS: Right: 25 sec Left: 9 sec (22) progressing    Current: MET: 30 seconds, bilateral (2022)  Goal: Pt to report < 3/10 pain at worst to increase ease with ADLs. Status at last note/certification:  Pain at worst in the last week: 3/10; on average: 4/10    Current: Not met: regressing: Pain 8/10- when performing yard work. (10/12/2022)  Goal: Pt to report FOTO score of 72 pts to show improved function and quality of life. Status at last note/certification:  52 (9/6/54) regression, not met though patient does not report functional decline    Current: progressing - FOTO 65 pts (10/12/2022)  Goal: Patient  will be independent  with comprehensive updated HEP to continue to manage care independently after discharge. Status at last note/certification: new goal    Current: progressing: pt received new HEP exercises and verbalized understanding. (10/12/2022)    Key Functional Changes: Pt has attended 12 sessions of skilled therapy and is making progress towards his LTGs. His functional gains include greater ease with performing transfers, lifting objects off the floor and performing light to moderate ADLs. His functional deficits include requiring his [facility] when ambulating in his community and requires bilateral UE support when negotiating stairs. Pt has achieved one of his LTGs and was able to maintain SLS for 30 seconds, bilaterally. He was also able to improve his objective LE strength measurements indicating greater ease with sit to stand transfers. He increased his objective LE ROM measurements to improve gait pattern, and decrease the risk of falls. His max pain level is a 8/10, and occurred with prolonged standing and yard work., His best pain level is a 3/10, at rest. Pt received new HEP exercises and verbalized understanding of exercises frequency and performance. Pt gives himself a 75 percent self reported improvement since starting therapy, but expressed desire to no longer require AD when ambulating.       Updated Goals: to be achieved in 4 weeks:  Goal: Pt to increase left knee AROM to 0 to 134 deg without increased pain for ease of squatting and stair negotiation. Status at last note/certification: left -5-120 deg AROM (10/12/2022)  Current:    Goal: Pt to increase B hip ext, left hip ER strength to 4+/5 grossly to increase stability with gait and stair negotiation. Status at last note/certification:   Strength:  progressing: (10/12/2022)    Right (/5) Left (/5)   Hip     Flexion 5 5             Abduction 5 5             Adduction 5 5             Extension 5 5             ER 5 4             IR 5 4+   Current:   Goal: Pt to report < 3/10 pain at worst to increase ease with ADLs. Status at last note/certification: Pain 1/60- when performing yard work. (10/12/2022)  Current:   Goal: Pt to report FOTO score of 72 pts to show improved function and quality of life. Status at last note/certification: FOTO 65 points: (10/12/2022)  Current:    Goal: Patient  will be independent  with comprehensive updated HEP to continue to manage care independently after discharge.    Status at last note/certification: Pt received new HEP exercises and verbalized understanding (10/12/2022)  Current:     ASSESSMENT/RECOMMENDATIONS:  [x]Continue therapy per initial plan/protocol at a frequency of  2 x per week for 4 weeks  []Continue therapy with the following recommended changes:_____________________      _____________________________________________________________________  []Discontinue therapy progressing towards or have reached established goals  []Discontinue therapy due to lack of appreciable progress towards goals  []Discontinue therapy due to lack of attendance or compliance  []Await Physician's recommendations/decisions regarding therapy  []Other:________________________________________________________________    Thank you for this referral.   Rosanne Darby, PT 10/12/2022 12:28 PM  NOTE TO PHYSICIAN:  [address] AND   FAX TO [facility]: [phone]  If you are unable to process this request in 24 hours please contact our office: [phone]  []  I have read the above report and request that my patient continue as recommended. []  I have read the above report and request that my patient continue therapy with the following changes/special instructions:________________________________________  []I have read the above report and request that my patient be discharged from therapy.     [de-identified] Signature:____________Date:_________TIME:________     Sadaf Kitchen MD  ** Signature, Date and Time must be completed for valid certification **

## 2022-10-17 ENCOUNTER — TELEPHONE (OUTPATIENT)
Dept: PHYSICAL THERAPY | Age: 52
End: 2022-10-17

## 2022-10-19 ENCOUNTER — HOSPITAL ENCOUNTER (OUTPATIENT)
Dept: PHYSICAL THERAPY | Age: 52
Discharge: HOME | End: 2022-10-19
Attending: SPECIALIST
Payer: COMMERCIAL

## 2022-10-19 PROCEDURE — 97112 NEUROMUSCULAR REEDUCATION: CPT

## 2022-10-19 PROCEDURE — 97530 THERAPEUTIC ACTIVITIES: CPT

## 2022-10-19 PROCEDURE — 97110 THERAPEUTIC EXERCISES: CPT

## 2022-10-19 NOTE — PROGRESS NOTES
PT DAILY TREATMENT NOTE     Patient Name: Avani Lange  Date:10/19/2022  : 1970  [x]  Patient  Verified  Payor: Darin 22 / Plan: [address] / Product Type: Managed Care Medicaid /    In time:10:35  Out time:11:20  Total Treatment Time (min): 45  Visit #: 2 of 8    Medicare/BCBS Only   Total Timed Codes (min):   1:1 Treatment Time:         Treatment Area: Pain in left knee [M25.562]  Primary osteoarthritis of left knee [M17.12]    SUBJECTIVE  Pain Level (0-10 scale): 3  Any medication changes, allergies to medications, adverse drug reactions, diagnosis change, or new procedure performed?: [x] No    [] Yes (see summary sheet for update)  Subjective functional status/changes:   [] No changes reported  I feel like I'm getting better. I want to start running soon. OBJECTIVE    Modality rationale: decrease inflammation and decrease pain to improve the patients ability to ambulate longer distances.     Min Type Additional Details    [] Estim:  []Unatt       []IFC  []Premod                        []Other:  []w/ice   []w/heat  Position:  Location:    [] Estim: []Att    []TENS instruct  []NMES                    []Other:  []w/US   []w/ice   []w/heat  Position:  Location:    []  Traction: [] Cervical       []Lumbar                       [] Prone          []Supine                       []Intermittent   []Continuous Lbs:  [] before manual  [] after manual    []  Ultrasound: []Continuous   [] Pulsed                           []1MHz   []3MHz W/cm2:  Location:    []  Iontophoresis with dexamethasone         Location: [] Take home patch   [] In clinic   5 [x]  Ice     []  heat  []  Ice massage  []  Laser   []  Anodyne Position: reclined  Location: left knee    []  Laser with stim  []  Other:  Position:  Location:    []  Vasopneumatic Device    []  Right     []  Left  Pre-treatment girth:  Post-treatment girth:  Measured at (location):  Pressure:       [] lo [] med [] hi Temperature: [] lo [] med [] hi   [x] Skin assessment post-treatment:  [x]intact []redness- no adverse reaction    []redness - adverse reaction:     20 min Therapeutic Exercise:  [] See flow sheet :   Rationale: increase ROM and increase strength to improve the patients ability to bend, lift and squat. 10 min Therapeutic Activity:  []  See flow sheet :   Rationale: increase strength, improve coordination, and improve balance  to improve the patients ability to negotiate uneven terrain. 15 min Neuromuscular Re-education:  []  See flow sheet :   Rationale: improve coordination, improve balance, and increase proprioception  to improve the patients ability to maintain balance in SLS and decrease risks of falls. With   [x] TE   [x] TA   [x] neuro   [] other: Patient Education: [x] Review HEP    [] Progressed/Changed HEP based on:   [] positioning   [] body mechanics   [] transfers   [] heat/ice application    [] other:      Other Objective/Functional Measures: exercises per chart. 3/10 max pain level. Pain Level (0-10 scale) post treatment: 0    ASSESSMENT/Changes in Function: Pt reports to skilled therapy session with decreased functional ROM and strength in the left LE. Pt required bilateral UE support during bosu step-ups to maintain upright posture. Verbal cues were provided during thera-band monster walks with Pt demonstrating proper sequencing following instruction. Tactile cues were provided during SLRs to promote knees extension and improve gait pattern. Pt described a minor cramping in the left gluteus agatha and hamstrings during single leg bridges but noted improvement with rest. Session tolerated well and  concluded with cold pack to the left knee.      Patient will continue to benefit from skilled PT services to modify and progress therapeutic interventions, address functional mobility deficits, address ROM deficits, address strength deficits, analyze and address soft tissue restrictions, analyze and cue movement patterns, analyze and modify body mechanics/ergonomics, and assess and modify postural abnormalities to attain remaining goals. [x]  See Plan of Care  []  See progress note/recertification  []  See Discharge Summary         Progress towards goals / Updated goals:  Goal: Pt to increase left knee AROM to 0 to 134 deg without increased pain for ease of squatting and stair negotiation. Status at last note/certification: left -5-120 deg AROM (10/12/2022)  Current:    Goal: Pt to increase B hip ext, left hip ER strength to 4+/5 grossly to increase stability with gait and stair negotiation. Status at last note/certification:   Strength:  progressing: (10/12/2022)    Right (/5) Left (/5)   Hip     Flexion 5 5             Abduction 5 5             Adduction 5 5             Extension 5 5             ER 5 4             IR 5 4+   Current:   Goal: Pt to report < 3/10 pain at worst to increase ease with ADLs. Status at last note/certification: Pain 6/12- when performing yard work. (10/12/2022)  Current: Pain 3/10- when sitting for long periods of time. (10/19/2022)  Goal: Pt to report FOTO score of 72 pts to show improved function and quality of life. Status at last note/certification: FOTO 65 points: (10/12/2022)  Current:    Goal: Patient  will be independent  with comprehensive updated HEP to continue to manage care independently after discharge.    Status at last note/certification: Pt received new HEP exercises and verbalized understanding (10/12/2022)  Current:   PLAN  [x]  Upgrade activities as tolerated     [x]  Continue plan of care  []  Update interventions per flow sheet       []  Discharge due to:_  []  Other:_      Luis E Valentine PTA 10/19/2022  10:43 AM    Future Appointments   Date Time Provider Marco Sellers   10/24/2022 10:30 AM Walker Huerta [facility]   10/26/2022 10:30 AM Ina Yates PTA [facility]   10/31/2022 10:30 AM Jasmin Darby, PT [facility]   11/2/2022 10:[facility]

## 2022-10-21 ENCOUNTER — HOSPITAL ENCOUNTER (OUTPATIENT)
Dept: PHYSICAL THERAPY | Age: 52
Discharge: HOME | End: 2022-10-21
Attending: SPECIALIST
Payer: COMMERCIAL

## 2022-10-21 PROCEDURE — 97110 THERAPEUTIC EXERCISES: CPT

## 2022-10-21 PROCEDURE — 97112 NEUROMUSCULAR REEDUCATION: CPT

## 2022-10-21 PROCEDURE — 97530 THERAPEUTIC ACTIVITIES: CPT

## 2022-10-21 NOTE — PROGRESS NOTES
PT DAILY TREATMENT NOTE     Patient Name: Annia Trujillo  Date:10/21/2022  : 1970  [x]  Patient  Verified  Payor: Darin Jeter / Plan: [address] / Product Type: Managed Care Medicaid /    In time:10:40  Out time:11:30  Total Treatment Time (min): 50  Visit #: 3 of 8    Medicare/BCBS Only   Total Timed Codes (min):   1:1 Treatment Time:         Treatment Area: Pain in left knee [M25.562]  Primary osteoarthritis of left knee [M17.12]    SUBJECTIVE  Pain Level (0-10 scale): 3/10  Any medication changes, allergies to medications, adverse drug reactions, diagnosis change, or new procedure performed?: [x] No    [] Yes (see summary sheet for update)  Subjective functional status/changes:   [] No changes reported  \"I am doing well and the knee is getting better. The therapy is helping. \"     OBJECTIVE    25 min Therapeutic Exercise:  [] See flow sheet :   Rationale: increase ROM and increase strength to improve the patients ability to bend, lift and squat. 15 min Therapeutic Activity:  []  See flow sheet :   Rationale: increase strength, improve coordination, and improve balance  to improve the patients ability to negotiate uneven terrain. 10 min Neuromuscular Re-education:  []  See flow sheet :   Rationale: improve coordination, improve balance, and increase proprioception  to improve the patients ability to maintain balance in SLS and decrease risks of falls. With   [x] TE   [x] TA   [x] neuro   [] other: Patient Education: [x] Review HEP    [] Progressed/Changed HEP based on:   [] positioning   [] body mechanics   [] transfers   [] heat/ice application    [] other:      Other Objective/Functional Measures: Performed exercises with increased repetitions.       Pain Level (0-10 scale) post treatment: 0/10    ASSESSMENT/Changes in Function: Pt's session focused on hip/knee strength and stability interventions to increase knee stability and ease for squatting, stair negotiation, and lifting tasks. Pt has steadily progressed with skilled therapy, exhibiting reduced pain levels and improved functional strength. Pt continues to report discomfort at times at end range knee flexion but pain is not lingering. Pt would benefit from continued skilled therapy to further improve knee stability for ease of ADLs and recreational activities. Patient will continue to benefit from skilled PT services to modify and progress therapeutic interventions, address functional mobility deficits, address ROM deficits, address strength deficits, analyze and address soft tissue restrictions, analyze and cue movement patterns, analyze and modify body mechanics/ergonomics, and assess and modify postural abnormalities to attain remaining goals. []  See Plan of Care  []  See progress note/recertification  []  See Discharge Summary         Progress towards goals / Updated goals:  Goal: Pt to increase left knee AROM to 0 to 134 deg without increased pain for ease of squatting and stair negotiation. Status at last note/certification: left -5-120 deg AROM (10/12/2022)  Current:    Goal: Pt to increase B hip ext, left hip ER strength to 4+/5 grossly to increase stability with gait and stair negotiation. Status at last note/certification:   Strength:  progressing: (10/12/2022)    Right (/5) Left (/5)   Hip     Flexion 5 5             Abduction 5 5             Adduction 5 5             Extension 5 5             ER 5 4             IR 5 4+   Current:   Goal: Pt to report < 3/10 pain at worst to increase ease with ADLs. Status at last note/certification: Pain 0/07- when performing yard work. (10/12/2022)  Current: Pain 3/10- when sitting for long periods of time. (10/19/2022)  Goal: Pt to report FOTO score of 72 pts to show improved function and quality of life.   Status at last note/certification: FOTO 65 points: (10/12/2022)  Current:    Goal: Patient  will be independent  with comprehensive updated HEP to continue to manage care independently after discharge.    Status at last note/certification: Pt received new HEP exercises and verbalized understanding (10/12/2022)  Current:     PLAN  [x]  Upgrade activities as tolerated     [x]  Continue plan of care  []  Update interventions per flow sheet       []  Discharge due to:_  []  Other:_      Carmel Darby, PT 10/21/2022  10:43 AM    Future Appointments   Date Time Provider Marco Sellers   [facility]2022 10:30 [facility]   10/28/2022 10:30 AM Carmel Darby, [facility]   11/2/2022 10:30 [facility]   11/4/2022 10:30 AM [facility]

## 2022-10-24 ENCOUNTER — APPOINTMENT (OUTPATIENT)
Dept: PHYSICAL THERAPY | Age: 52
End: 2022-10-24
Attending: SPECIALIST
Payer: COMMERCIAL

## 2022-10-26 ENCOUNTER — HOSPITAL ENCOUNTER (OUTPATIENT)
Dept: PHYSICAL THERAPY | Age: 52
Discharge: HOME | End: 2022-10-26
Attending: SPECIALIST
Payer: COMMERCIAL

## 2022-10-26 PROCEDURE — 97110 THERAPEUTIC EXERCISES: CPT

## 2022-10-26 PROCEDURE — 97530 THERAPEUTIC ACTIVITIES: CPT

## 2022-10-26 PROCEDURE — 97112 NEUROMUSCULAR REEDUCATION: CPT

## 2022-10-26 NOTE — PROGRESS NOTES
PT DAILY TREATMENT NOTE     Patient Name: Larisa Linda  Date:10/26/2022  : 1970  [x]  Patient  Verified  Payor: Darin 22 / Plan: [address] / Product Type: Managed Care Medicaid /    In time:10:30  Out time:11:15  Total Treatment Time (min): 45  Visit #: 4 of 8    Medicare/BCBS Only   Total Timed Codes (min):   1:1 Treatment Time:         Treatment Area: Pain in left knee [M25.562]  Primary osteoarthritis of left knee [M17.12]    SUBJECTIVE  Pain Level (0-10 scale): 2  Any medication changes, allergies to medications, adverse drug reactions, diagnosis change, or new procedure performed?: [x] No    [] Yes (see summary sheet for update)  Subjective functional status/changes:   [] No changes reported  I'm feeling a little better this morning. OBJECTIVE    Modality rationale: decrease pain and increase tissue extensibility to improve the patients ability to perform ADLs for longer. Min Type Additional Details    [] Estim:  []Unatt       []IFC  []Premod                        []Other:  []w/ice   []w/heat  Position:  Location:    [] Estim: []Att    []TENS instruct  []NMES                    []Other:  []w/US   []w/ice   []w/heat  Position:  Location:    []  Traction: [] Cervical       []Lumbar                       [] Prone          []Supine                       []Intermittent   []Continuous Lbs:  [] before manual  [] after manual    []  Ultrasound: []Continuous   [] Pulsed                           []1MHz   []3MHz W/cm2:  Location:    []  Iontophoresis with dexamethasone         Location: [] Take home patch   [] In clinic   5 [x]  Ice     []  heat  []  Ice massage  []  Laser   []  Anodyne Position: reclined.    Location: left knee.     []  Laser with stim  []  Other:  Position:  Location:    []  Vasopneumatic Device    []  Right     []  Left  Pre-treatment girth:  Post-treatment girth:  Measured at (location):  Pressure:       [] lo [] med [] hi   Temperature: [] lo [] med [] hi   [x] Skin assessment post-treatment:  [x]intact []redness- no adverse reaction    []redness - adverse reaction:     10 min Therapeutic Exercise:  [] See flow sheet :   Rationale: increase ROM and increase strength to improve the patients ability to ambulate long distances. 15 min Therapeutic Activity:  []  See flow sheet :   Rationale: increase strength, improve coordination, and improve balance  to improve the patients ability to negotiate uneven terrain. 15 min Neuromuscular Re-education:  []  See flow sheet :   Rationale: improve coordination, improve balance, and increase proprioception  to improve the patients ability to maintain balance in SLS and decrease the risks of falls. With   [x] TE   [x] TA   [x] neuro   [] other: Patient Education: [x] Review HEP    [] Progressed/Changed HEP based on:   [] positioning   [] body mechanics   [] transfers   [] heat/ice application    [] other:      Other Objective/Functional Measures: exercises per chart. Pain Level (0-10 scale) post treatment: 0    ASSESSMENT/Changes in Function: Pt reports to skilled therapy session with decreased standing/ walking tolerance and increased left LE pain with ADLs. Pt experienced increased, \" popping\" in the left knee with standing thera-band marches but denies any increases in pain. He demonstrated good standing balance during bosu- lunges and required only occasional UE assistance. He was able to increase resistance during SLRs to improve functional LE strength. Pt tolerated the addition of runner's stretch with the left leg posterior to improve functional LE ROM. Session tolerated well.      Patient will continue to benefit from skilled PT services to modify and progress therapeutic interventions, address functional mobility deficits, address ROM deficits, address strength deficits, analyze and address soft tissue restrictions, analyze and cue movement patterns, analyze and modify body mechanics/ergonomics, and assess and modify postural abnormalities to attain remaining goals. [x]  See Plan of Care  []  See progress note/recertification  []  See Discharge Summary         Progress towards goals / Updated goals:  Goal: Pt to increase left knee AROM to 0 to 134 deg without increased pain for ease of squatting and stair negotiation. Status at last note/certification: left -5-120 deg AROM (10/12/2022)  Current:  -6-125 (10/26/2022)  Goal: Pt to increase B hip ext, left hip ER strength to 4+/5 grossly to increase stability with gait and stair negotiation. Status at last note/certification:   Strength:  progressing: (10/12/2022)    Right (/5) Left (/5)   Hip     Flexion 5 5             Abduction 5 5             Adduction 5 5             Extension 5 5             ER 5 4             IR 5 4+   Current:   Goal: Pt to report < 3/10 pain at worst to increase ease with ADLs. Status at last note/certification: Pain 6/98- when performing yard work. (10/12/2022)  Current: Pain 3/10- when sitting for long periods of time. (10/19/2022)  Goal: Pt to report FOTO score of 72 pts to show improved function and quality of life. Status at last note/certification: FOTO 65 points: (10/12/2022)  Current:    Goal: Patient  will be independent  with comprehensive updated HEP to continue to manage care independently after discharge.    Status at last note/certification: Pt received new HEP exercises and verbalized understanding (10/12/2022)  Current:        PLAN  [x]  Upgrade activities as tolerated     [x]  Continue plan of care  []  Update interventions per flow sheet       []  Discharge due to:_  []  Other:_      Chantel Medrano PTA 10/26/2022  10:33 AM    Future Appointments   Date Time Provider Marco Sellers   10/28/2022 10:30 AM Sean Darby PT [facility]   11/2/2022 10:30 AM [facility]   11/4/2022 10:30 AM [facility]

## 2022-10-28 ENCOUNTER — HOSPITAL ENCOUNTER (OUTPATIENT)
Dept: PHYSICAL THERAPY | Age: 52
Discharge: HOME | End: 2022-10-28
Attending: SPECIALIST
Payer: COMMERCIAL

## 2022-10-28 PROCEDURE — 97535 SELF CARE MNGMENT TRAINING: CPT

## 2022-10-28 PROCEDURE — 97112 NEUROMUSCULAR REEDUCATION: CPT

## 2022-10-28 PROCEDURE — 97530 THERAPEUTIC ACTIVITIES: CPT

## 2022-10-28 PROCEDURE — 97110 THERAPEUTIC EXERCISES: CPT

## 2022-10-28 NOTE — PROGRESS NOTES
PT DAILY TREATMENT NOTE     Patient Name: Melvin Malik  Date:10/28/2022  : 1970  [x]  Patient  Verified  Payor: Darin 22 / Plan: [address] / Product Type: Managed Care Medicaid /    In time:10:31  Out time:11:15  Total Treatment Time (min): 44  Visit #: 5 of 8    Medicare/BCBS Only   Total Timed Codes (min):   1:1 Treatment Time:         Treatment Area: Pain in left knee [M25.562]  Primary osteoarthritis of left knee [M17.12]    SUBJECTIVE  Pain Level (0-10 scale): 2/10  Any medication changes, allergies to medications, adverse drug reactions, diagnosis change, or new procedure performed?: [x] No    [] Yes (see summary sheet for update)  Subjective functional status/changes:   [] No changes reported  \"I'm so tired of walking with the cane. I know I still need to use it but I just want to be rid of it. \"    OBJECTIVE    8 min Therapeutic Exercise:  [] See flow sheet :   Rationale: increase ROM and increase strength to improve the patients ability to ambulate long distances. 15 min Therapeutic Activity:  []  See flow sheet : heel to toe gait pattern practice; march walking, sidestep squatting, step ups   Rationale: increase strength, improve coordination, and improve balance  to improve the patients ability to negotiate uneven terrain. 13 min Neuromuscular Re-education:  []  See flow sheet :   Rationale: improve coordination, improve balance, and increase proprioception  to improve the patients ability to maintain balance in SLS and decrease the risks of falls.         8 min Self Care/Home Management: education on nutrition questions, potential dietician consult to assist with nutrition counseling  for food choices   Rationale:  advice on appropriate consult   to improve the patients ability to assist with weight loss and improved nutrition    With   [x] TE   [x] TA   [x] neuro   [] other: Patient Education: [x] Review HEP    [] Progressed/Changed HEP based on:   [] positioning   [] body mechanics   [] transfers   [] heat/ice application    [] other:      Other Objective/Functional Measures: Updated interventions per flow sheet. Pt ambulated throughout clinic without SPC with emphasis on heel to toe gait pattern. Pain Level (0-10 scale) post treatment: 0/10    ASSESSMENT/Changes in Function: Pt has attended skilled therapy for 16 visits to address left knee pain, limited mobility, and strength associated with left patellar fracture surgically repaired 4.5 years ago. Pt educated on heel to toe gait pattern and was able to return demonstrate good carryover throughout the session. Pt appropriately challenged with progression/update of exercise interventions with focus on quadriceps concentric/eccentric strengthening to improve left knee stability and work towards more independent gait and activity tolerance. Pt noted left LE felt \"different\" with exercise progressions but that exercises were good challenge. Pt will continue to benefit from continued skilled therapy to further improve left quadriceps strength, left knee stability, to work towards more independent function with less pain and difficulty and possibly avoid further surgical interventions. Patient will continue to benefit from skilled PT services to modify and progress therapeutic interventions, address functional mobility deficits, address ROM deficits, address strength deficits, analyze and address soft tissue restrictions, analyze and cue movement patterns, analyze and modify body mechanics/ergonomics, and assess and modify postural abnormalities to attain remaining goals. []  See Plan of Care  []  See progress note/recertification  []  See Discharge Summary         Progress towards goals / Updated goals:  Goal: Pt to increase left knee AROM to 0 to 134 deg without increased pain for ease of squatting and stair negotiation.   Status at last note/certification: left -5-120 deg AROM (10/12/2022)  Current: progressing: -6-125 deg (10/26/2022)  Goal: Pt to increase B hip ext, left hip ER strength to 4+/5 grossly to increase stability with gait and stair negotiation. Status at last note/certification:   Strength:    Right (/5) Left (/5)   Hip     Flexion 5 5             Abduction 5 5             Adduction 5 5             Extension 5 5             ER 5 4             IR 5 4+   Current: reassess next visit (10/28/22)  Goal: Pt to report < 3/10 pain at worst to increase ease with ADLs. Status at last note/certification: Pain 8/17- when performing yard work. (10/12/2022)  Current: progressing - 3/10 pain at worst with prolonged sitting (10/28/2022)  Goal: Pt to report FOTO score of 72 pts to show improved function and quality of life. Status at last note/certification: FOTO 65 points: (10/12/2022)  Current:  reassess next visit (10/28/22)  Goal: Patient  will be independent  with comprehensive updated HEP to continue to manage care independently after discharge.    Status at last note/certification: Pt received new HEP exercises and verbalized understanding (10/12/2022)  Current: progressing - Pt compliant with current updated HEP given for hip strength/stability focus (10/28/22)     PLAN  [x]  Upgrade activities as tolerated     [x]  Continue plan of care  []  Update interventions per flow sheet       []  Discharge due to:_  [x]  Other:_ goals, PN next visit     Batsheva Darby, PT 10/28/2022  10:33 AM    Future Appointments   Date Time Provider Marco Sellers   11/2/2022 10:30 AM Tony Santiago [facility]   11/4/2022 10:30 AM Tony Santiago [facility]

## 2022-10-31 ENCOUNTER — APPOINTMENT (OUTPATIENT)
Dept: PHYSICAL THERAPY | Age: 52
End: 2022-10-31
Attending: SPECIALIST
Payer: COMMERCIAL

## 2022-11-02 ENCOUNTER — APPOINTMENT (OUTPATIENT)
Dept: PHYSICAL THERAPY | Age: 52
End: 2022-11-02
Attending: SPECIALIST
Payer: COMMERCIAL

## 2022-11-02 ENCOUNTER — HOSPITAL ENCOUNTER (OUTPATIENT)
Dept: PHYSICAL THERAPY | Age: 52
Discharge: HOME | End: 2022-11-02
Attending: SPECIALIST
Payer: COMMERCIAL

## 2022-11-02 PROCEDURE — 97112 NEUROMUSCULAR REEDUCATION: CPT

## 2022-11-02 PROCEDURE — 97110 THERAPEUTIC EXERCISES: CPT

## 2022-11-02 PROCEDURE — 97530 THERAPEUTIC ACTIVITIES: CPT

## 2022-11-02 NOTE — PROGRESS NOTES
PT DAILY TREATMENT NOTE     Patient Name: Annia Trujillo  Date:2022  : 1970  [x]  Patient  Verified  Payor: Darin 22 / Plan: [address] / Product Type: Managed Care Medicaid /    In time:10:31  Out time:11:20  Total Treatment Time (min): 52  Visit #: 6 of 8    Medicare/BCBS Only   Total Timed Codes (min):   1:1 Treatment Time:         Treatment Area: Pain in left knee [M25.562]  Primary osteoarthritis of left knee [M17.12]    SUBJECTIVE  Pain Level (0-10 scale): 2  Any medication changes, allergies to medications, adverse drug reactions, diagnosis change, or new procedure performed?: [x] No    [] Yes (see summary sheet for update)  Subjective functional status/changes:   [] No changes reported  My left knee feels stiff. It's almost like I can feel the hardware in my knee. OBJECTIVE    Modality rationale: decrease inflammation and decrease pain to improve the patients ability to perform ADLs for longer.     Min Type Additional Details    [] Estim:  []Unatt       []IFC  []Premod                        []Other:  []w/ice   []w/heat  Position:  Location:    [] Estim: []Att    []TENS instruct  []NMES                    []Other:  []w/US   []w/ice   []w/heat  Position:  Location:    []  Traction: [] Cervical       []Lumbar                       [] Prone          []Supine                       []Intermittent   []Continuous Lbs:  [] before manual  [] after manual    []  Ultrasound: []Continuous   [] Pulsed                           []1MHz   []3MHz W/cm2:  Location:    []  Iontophoresis with dexamethasone         Location: [] Take home patch   [] In clinic   5 [x]  Ice     []  heat  []  Ice massage  []  Laser   []  Anodyne Position: seated   Location: left knee.     []  Laser with stim  []  Other:  Position:  Location:    []  Vasopneumatic Device    []  Right     []  Left  Pre-treatment girth:  Post-treatment girth:  Measured at (location):  Pressure:       [] lo [] med [] hi   Temperature: [] lo [] med [] hi   [x] Skin assessment post-treatment:  [x]intact []redness- no adverse reaction    []redness - adverse reaction:     10 min Therapeutic Exercise:  [] See flow sheet :   Rationale: increase ROM and increase strength to improve the patients ability to bend, lift and squat. 25 min Therapeutic Activity:  []  See flow sheet :   Rationale: increase strength, improve coordination, and improve balance  to improve the patients ability to ambul     10 min Neuromuscular Re-education:  []  See flow sheet :   Rationale: improve coordination, improve balance, and increase proprioception  to improve the patients ability to maintain balance in SLS and decrease risks of falls. With   [x] TE   [x] TA   [x] neuro   [] other: Patient Education: [x] Review HEP    [] Progressed/Changed HEP based on:   [] positioning   [] body mechanics   [] transfers   [] heat/ice application    [] other:      Other Objective/Functional Measures: exercises per chart. Pain Level (0-10 scale) post treatment: 2    ASSESSMENT/Changes in Function: Today's session focused on increasing hip stability and strength. Verbal cues were provided during march walks to promote heel strike and increase step length. Pt demonstrated good standing balance during side steps with squats and maintained upright posture with no trunk sway observed. Pt had greater difficulty performing lateral step ups vs forward and required UE balance checks during lateral step ups. Pt experienced increased pain with eccentric step downs with a 6 inch step but tolerated a 4 inch step. Pt required multiple standing rest breaks due to increases in systemic fatigue. Session tolerated well and concluded with cold pack to the left knee.      Patient will continue to benefit from skilled PT services to modify and progress therapeutic interventions, address functional mobility deficits, address ROM deficits, address strength deficits, analyze and address soft tissue restrictions, analyze and cue movement patterns, analyze and modify body mechanics/ergonomics, and assess and modify postural abnormalities to attain remaining goals. [x]  See Plan of Care  []  See progress note/recertification  []  See Discharge Summary         Progress towards goals / Updated goals:  Goal: Pt to increase left knee AROM to 0 to 134 deg without increased pain for ease of squatting and stair negotiation. Status at last note/certification: left -5-120 deg AROM (10/12/2022)  Current: progressing: -6-125 deg (10/26/2022)  Goal: Pt to increase B hip ext, left hip ER strength to 4+/5 grossly to increase stability with gait and stair negotiation. Status at last note/certification:   Strength:    Right (/5) Left (/5)   Hip     Flexion 5 5             Abduction 5 5             Adduction 5 5             Extension 5 5             ER 5 4             IR 5 4+   Current: (11/2/2022): progressing. Right (/5) Left (/5)   Hip     Flexion 5 5             Abduction 5 5             Adduction 5 5             Extension 5 5             ER 5 4+             IR 5 5     Goal: Pt to report < 3/10 pain at worst to increase ease with ADLs. Status at last note/certification: Pain 4/51- when performing yard work. (10/12/2022)  Current: progressing - 3/10 pain at worst with prolonged sitting (10/28/2022)  Goal: Pt to report FOTO score of 72 pts to show improved function and quality of life. Status at last note/certification: FOTO 65 points: (10/12/2022)  Current:  reassess next visit (10/28/22)  Goal: Patient  will be independent  with comprehensive updated HEP to continue to manage care independently after discharge.    Status at last note/certification: Pt received new HEP exercises and verbalized understanding (10/12/2022)  Current: progressing - Pt compliant with current updated HEP given for hip strength/stability focus (10/28/22)       PLAN  [x]  Upgrade activities as tolerated     [x]  Continue plan of care  []  Update interventions per flow sheet       []  Discharge due to:_  []  Other:_      Hunter Holt PTA 11/2/2022  10:34 AM    Future Appointments   Date Time Provider Marco Sellers   11/4/2022 10:30 AM Barbara Andrews PTA [facility]

## 2022-11-09 ENCOUNTER — HOSPITAL ENCOUNTER (OUTPATIENT)
Dept: PHYSICAL THERAPY | Age: 52
Discharge: HOME | End: 2022-11-09
Attending: SPECIALIST
Payer: COMMERCIAL

## 2022-11-09 PROCEDURE — 97110 THERAPEUTIC EXERCISES: CPT

## 2022-11-09 PROCEDURE — 97530 THERAPEUTIC ACTIVITIES: CPT

## 2022-11-09 PROCEDURE — 97112 NEUROMUSCULAR REEDUCATION: CPT

## 2022-11-09 NOTE — PROGRESS NOTES
PT DAILY TREATMENT NOTE     Patient Name: Maty Floyd  Date:2022  : 1970  [x]  Patient  Verified  Payor: Darin 22 / Plan: [address] / Product Type: Managed Care Medicaid /    In time:10:31  Out time:11:20  Total Treatment Time (min): 52  Visit #: 7 of 10    Medicare/BCBS Only   Total Timed Codes (min):   1:1 Treatment Time:         Treatment Area: Pain in left knee [M25.562]  Primary osteoarthritis of left knee [M17.12]    SUBJECTIVE  Pain Level (0-10 scale): 4  Any medication changes, allergies to medications, adverse drug reactions, diagnosis change, or new procedure performed?: [x] No    [] Yes (see summary sheet for update)  Subjective functional status/changes:   [] No changes reported  My left knee is bothering me a little more today. OBJECTIVE    Modality rationale: decrease inflammation and decrease pain to improve the patients ability to perform ADLs for longer.     Min Type Additional Details    [] Estim:  []Unatt       []IFC  []Premod                        []Other:  []w/ice   []w/heat  Position:  Location:    [] Estim: []Att    []TENS instruct  []NMES                    []Other:  []w/US   []w/ice   []w/heat  Position:  Location:    []  Traction: [] Cervical       []Lumbar                       [] Prone          []Supine                       []Intermittent   []Continuous Lbs:  [] before manual  [] after manual    []  Ultrasound: []Continuous   [] Pulsed                           []1MHz   []3MHz W/cm2:  Location:    []  Iontophoresis with dexamethasone         Location: [] Take home patch   [] In clinic   5 [x]  Ice     []  heat  []  Ice massage  []  Laser   []  Anodyne Position:  Location:    []  Laser with stim  []  Other:  Position:  Location:    []  Vasopneumatic Device    []  Right     []  Left  Pre-treatment girth:  Post-treatment girth:  Measured at (location):  Pressure:       [] lo [] med [] hi   Temperature: [] lo [] med [] hi   [x] Skin assessment post-treatment:  []intact [x]redness- no adverse reaction    []redness - adverse reaction:     14 min Therapeutic Exercise:  [] See flow sheet :   Rationale: increase ROM and increase strength to improve the patients ability to ambulate long distances. 20 min Therapeutic Activity:  []  See flow sheet :   Rationale: increase strength, improve coordination, and improve balance  to improve the patients ability to negotiate stairs. 10 min Neuromuscular Re-education:  []  See flow sheet :   Rationale: improve coordination, improve balance, and increase proprioception  to improve LE biomechanics. With   [x] TE   [x] TA   [x] neuro   [] other: Patient Education: [x] Review HEP    [] Progressed/Changed HEP based on:   [] positioning   [] body mechanics   [] transfers   [] heat/ice application    [] other:      Other Objective/Functional Measures: Added weight hip ER/IR, and Terminal knee extension at wall with ball. Pain Level (0-10 scale) post treatment: 0    ASSESSMENT/Changes in Function: Pt reports to skilled therapy session with decreased functional ROM in the left LE and gait abnormalities. Session was tolerated well with Pt improving objective ROM measurements and overall pain levels. Today's session was intentionally regressed due to increases in pain. Pt tolerated the addition of TKEs at wall to improve knee extension and normalize gait pattern. Pt demonstrated good dynamic standing balance during marches and did not display LOB. Pt received new HEP and verbalized understanding. Session tolerated well and concluded with cold pack to the left knee in siting.      Patient will continue to benefit from skilled PT services to modify and progress therapeutic interventions, address functional mobility deficits, address ROM deficits, address strength deficits, analyze and address soft tissue restrictions, analyze and cue movement patterns, analyze and modify body mechanics/ergonomics, and assess and modify postural abnormalities to attain remaining goals. [x]  See Plan of Care  []  See progress note/recertification  []  See Discharge Summary         Progress towards goals / Updated goals:  Goal: Pt to increase left knee AROM to 0 to 134 deg without increased pain for ease of squatting and stair negotiation. Status at last note/certification: left -5-120 deg AROM (10/12/2022)  Current: progressing: -6-128 deg (11/9/2022)  Goal: Pt to increase B hip ext, left hip ER strength to 4+/5 grossly to increase stability with gait and stair negotiation. Status at last note/certification:   Strength:    Right (/5) Left (/5)   Hip     Flexion 5 5             Abduction 5 5             Adduction 5 5             Extension 5 5             ER 5 4             IR 5 4+   Current: (11/9/2022): progressing. Right (/5) Left (/5)   Hip     Flexion 5 5             Abduction 5 5             Adduction 5 5             Extension 5 5             ER 5 4+             IR 5 5      Goal: Pt to report < 3/10 pain at worst to increase ease with ADLs. Status at last note/certification: Pain 5/05- when performing yard work. (10/12/2022)  Current: progressing - 4/10 pain at worst with prolonged sitting (11/9/2022)  Goal: Pt to report FOTO score of 72 pts to show improved function and quality of life. Status at last note/certification: FOTO 65 points: (10/12/2022)  Current: FOTO 63 points (11/9/2022)   Goal: Patient  will be independent  with comprehensive updated HEP to continue to manage care independently after discharge. Status at last note/certification: Pt received new HEP exercises and verbalized understanding (10/12/2022)  Current: progressing - Pt compliant with current updated HEP given for hip strength/stability focus (10/28/22), Pt received new HEP exercises to improve knee extension and normalize gait pattern.  (11/9/2022)       PLAN  [x]  Upgrade activities as tolerated     [x]  Continue plan of care  []  Update interventions per flow sheet       []  Discharge due to:_  []  Other:_      Sai Garcia, PTA 11/9/2022  11:05 AM    Future Appointments   Date Time Provider Marco Sellers   11/16/2022 10:30 AM Nathalia [facility]   11/17/2022 11:15 AM Osbaldo Low [facility]

## 2022-11-09 NOTE — PROGRESS NOTES
[facility], [address] [place] [address]  [phone] fax    Progress Note  Patient name: Melvin Malik Start of Care: 22   Referral source: Leah Bobo MD : 1970   Medical/Treatment Diagnosis: Pain in left knee [M25.562]  Primary osteoarthritis of left knee [M17.12]  Payor: [employer] / Plan: [address] / Product Type: Managed Care Medicaid /  Onset Date:22     Prior Hospitalization: see medical history Provider#: [id number]   Medications: Verified on Patient Summary List    Comorbidities: Arthritis; BMI > 30  Prior Level of Function:In [employer] school to become ; lives in [facility] home with [employer] Maryann Arreaga; use of SPC in home, community  Visits from [place] of Care: 18    Missed Visits: 2    Established Goals:          Excellent Good         Limited           None  [x] Increased ROM   []  [x]  []  []  [x] Increased Strength  []  [x]  []  []  [x] Increased Mobility  []  [x]  []  []   [x] Decreased Pain   []  [x]  []  []  [] Decreased Swelling  []  []  []  []    Key Functional Changes: Pt has attended 18 sessions of skilled therapy and is making progress towards his LTGs. His functional gains include greater ease with ambulating without his SPC, and is now able to walk for 15-20 minutes without AD. He also has greater ease lifting heavy objects off the floor, improved mobility in the left LE. His functional deficits include reliance on his [facility] when ambulating in the community and increased symptoms when pushing a cart when fundraising or shopping. Pt also utilizes a step to pattern and I UE on the rails when negotiating stairs. He gives himself a 50 percent self reported improvement since staring therapy. Continued therapy recommended in preparation for final HEP and eventual discharge.      Current goals:  Goal: Pt to increase left knee AROM to 0 to 134 deg without increased pain for ease of squatting and stair negotiation. Status at last note/certification: left -5-120 deg AROM (10/12/2022)  Current: progressing: -6-128 deg (11/9/2022)  Goal: Pt to increase B hip ext, left hip ER strength to 4+/5 grossly to increase stability with gait and stair negotiation. Status at last note/certification:   Strength:    Right (/5) Left (/5)   Hip     Flexion 5 5             Abduction 5 5             Adduction 5 5             Extension 5 5             ER 5 4             IR 5 4+   Current: (11/9/2022): progressing. Right (/5) Left (/5)   Hip     Flexion 5 5             Abduction 5 5             Adduction 5 5             Extension 5 5             ER 5 4+             IR 5 5      Goal: Pt to report < 3/10 pain at worst to increase ease with ADLs. Status at last note/certification: Pain 8/50- when performing yard work. (10/12/2022)  Current: progressing - 4/10 pain at worst with prolonged sitting (11/9/2022)  Goal: Pt to report FOTO score of 72 pts to show improved function and quality of life. Status at last note/certification: FOTO 65 points: (10/12/2022)  Current: FOTO 63 points (11/9/2022)   Goal: Patient  will be independent  with comprehensive updated HEP to continue to manage care independently after discharge. Status at last note/certification: Pt received new HEP exercises and verbalized understanding (10/12/2022)  Current: progressing - Pt compliant with current updated HEP given for hip strength/stability focus (10/28/22), Pt received new HEP exercises to improve knee extension and normalize gait pattern. (11/9/2022)    Updated Goals: to be achieved in 2 weeks:  Goal: Pt to increase left knee AROM to 0 to 130 deg without increased pain for ease of squatting and stair negotiation. Status at last note/certification: 1-081 deg   Current:   Goal: Pt to increase left hip ER strength to 4+/5 grossly to increase stability with gait and stair negotiation.   Status at last note/certification: 4/5   Goal: Pt to report < 3/10 pain at worst to increase ease with ADLs. Status at last note/certification: 6/80 pain at worst with prolonged sitting  Current:   Goal: Pt to report FOTO score of 72 pts to show improved function and quality of life. Status at last note/certification: FOTO 63 points. Pt denies any decreases in function. Current:    Goal: Patient  will be independent  with comprehensive updated HEP to continue to manage care independently after discharge. Status at last note/certification: Pt received new HEP exercises to improve knee extension and normalize gait pattern, will plan on updating at d/c   Current:     ASSESSMENT/RECOMMENDATIONS:  [x]Continue therapy per initial plan/protocol at a frequency of  1 x per week for 2 weeks  []Continue therapy with the following recommended changes:_____________________      _____________________________________________________________________  []Discontinue therapy progressing towards or have reached established goals  []Discontinue therapy due to lack of appreciable progress towards goals  []Discontinue therapy due to lack of attendance or compliance  []Await Physician's recommendations/decisions regarding therapy  []Other:________________________________________________________________    Thank you for this referral.   Jolanta Rodrigez, PT 11/9/2022 10:33 AM  NOTE TO PHYSICIAN:  PLEASE COMPLETE THE ORDERS BELOW AND   FAX TO [facility]: [phone]  If you are unable to process this request in 24 hours please contact our office: [phone]  []  I have read the above report and request that my patient continue as recommended. []  I have read the above report and request that my patient continue therapy with the following changes/special instructions:________________________________________  []I have read the above report and request that my patient be discharged from therapy.     [facility] Signature:____________Date:_________TIME:________     Nelly Drummond MD  ** Signature, Date and Time must be completed for valid certification **

## 2022-11-11 ENCOUNTER — APPOINTMENT (OUTPATIENT)
Dept: PHYSICAL THERAPY | Age: 52
End: 2022-11-11
Attending: SPECIALIST
Payer: COMMERCIAL

## 2022-11-16 ENCOUNTER — HOSPITAL ENCOUNTER (OUTPATIENT)
Dept: PHYSICAL THERAPY | Age: 52
Discharge: HOME | End: 2022-11-16
Attending: SPECIALIST
Payer: COMMERCIAL

## 2022-11-16 PROCEDURE — 97535 SELF CARE MNGMENT TRAINING: CPT

## 2022-11-16 PROCEDURE — 97110 THERAPEUTIC EXERCISES: CPT

## 2022-11-16 PROCEDURE — 97112 NEUROMUSCULAR REEDUCATION: CPT

## 2022-11-16 NOTE — PROGRESS NOTES
PT DAILY TREATMENT NOTE     Patient Name: Alex Elliott  Date:2022  : 1970  [x]  Patient  Verified  Payor: [employer] / Plan: [address] / Product Type: Managed Care Medicaid /    In time: 10:00   Out time: 10:47  Total Treatment Time (min): 52  Visit #: 1 of 2    Treatment Area: Pain in left knee [M25.562]  Primary osteoarthritis of left knee [M17.12]    SUBJECTIVE  Pain Level (0-10 scale): 3  Any medication changes, allergies to medications, adverse drug reactions, diagnosis change, or new procedure performed?: [x] No    [] Yes (see summary sheet for update)  Subjective functional status/changes:   [] No changes reported  Pt reports his leg feels tired today and does not want to overwork it. OBJECTIVE    12 min Therapeutic Exercise:  [x] See flow sheet :   Rationale: increase ROM and increase strength to improve the patients ability to tolerate daily tasks. 10 min Self Care/Home Management  [x]  See flow sheet : pt education on importance of eating protein to help supplement muscle growth and recovery after therapy   Rationale: increase strength, improve coordination, and improve balance  to improve the patients ability to transfer with less pain. 25 min Neuromuscular Re-education:  [x]  See flow sheet : glute/quad/hip re-education exercises. Rationale: improve coordination, improve balance, and increase proprioception  to improve ease of ADls. With   [x] TE   [x] TA   [x] neuro   [] other: Patient Education: [x] Review HEP    [] Progressed/Changed HEP based on:   [] positioning   [] body mechanics   [] transfers   [] heat/ice application    [] other:      Other Objective/Functional Measures: added exercises per flow sheet. Pain Level (0-10 scale) post treatment: 2    ASSESSMENT/Changes in Function: Reported improvement in pain post session today. Pt requested plinth exercises today secondary to fatigue/tiredness.  Challenged with prone TKE and hip EXT and needed cues to perform with no trunk rotation. Pt education given (see self care above). Continue POC as tolerated to improve pain and mobility. Patient will continue to benefit from skilled PT services to modify and progress therapeutic interventions, address functional mobility deficits, address ROM deficits, address strength deficits, analyze and address soft tissue restrictions, analyze and cue movement patterns, analyze and modify body mechanics/ergonomics, and assess and modify postural abnormalities to attain remaining goals. []  See Plan of Care  []  See progress note/recertification  []  See Discharge Summary         Progress towards goals / Updated goals:  Goal: Pt to increase left knee AROM to 0 to 134 deg without increased pain for ease of squatting and stair negotiation. Status at last note/certification: left -5-120 deg AROM (10/12/2022)  Current: progressing: -6-128 deg (11/9/2022)  Goal: Pt to increase B hip ext, left hip ER strength to 4+/5 grossly to increase stability with gait and stair negotiation. Status at last note/certification:   Strength:    Right (/5) Left (/5)   Hip     Flexion 5 5             Abduction 5 5             Adduction 5 5             Extension 5 5             ER 5 4             IR 5 4+   Current: (11/9/2022): progressing. Right (/5) Left (/5)   Hip     Flexion 5 5             Abduction 5 5             Adduction 5 5             Extension 5 5             ER 5 4+             IR 5 5      Goal: Pt to report < 3/10 pain at worst to increase ease with ADLs. Status at last note/certification: Pain 4/37- when performing yard work. (10/12/2022)  Current: progressing - 4/10 pain at worst with prolonged sitting (11/9/2022)  Goal: Pt to report FOTO score of 72 pts to show improved function and quality of life.   Status at last note/certification: FOTO 65 points: (10/12/2022)  Current: FOTO 63 points (11/9/2022)   Goal: Patient  will be independent with comprehensive updated HEP to continue to manage care independently after discharge. Status at last note/certification: Pt received new HEP exercises and verbalized understanding (10/12/2022)  Current: progressing - Pt compliant with current updated HEP given for hip strength/stability focus (10/28/22), Pt received new HEP exercises to improve knee extension and normalize gait pattern.  (11/9/2022)     PLAN  [x]  Upgrade activities as tolerated     [x]  Continue plan of care  [x]  Update interventions per flow sheet       []  Discharge due to:_  []  Other:_      Ruthy Walker, PT 11/16/2022  10:05 AM    Future Appointments   Date Time Provider Marco Sellers   11/23/2022 11:15 AM Zenobia Anderson, PTA [facility]

## 2022-11-17 ENCOUNTER — APPOINTMENT (OUTPATIENT)
Dept: PHYSICAL THERAPY | Age: 52
End: 2022-11-17
Attending: SPECIALIST
Payer: COMMERCIAL

## 2022-11-23 ENCOUNTER — HOSPITAL ENCOUNTER (OUTPATIENT)
Dept: PHYSICAL THERAPY | Age: 52
Discharge: HOME | End: 2022-11-23
Attending: SPECIALIST
Payer: COMMERCIAL

## 2022-11-23 PROCEDURE — 97535 SELF CARE MNGMENT TRAINING: CPT

## 2022-11-23 PROCEDURE — 97112 NEUROMUSCULAR REEDUCATION: CPT

## 2022-11-23 PROCEDURE — 97530 THERAPEUTIC ACTIVITIES: CPT

## 2022-11-23 PROCEDURE — 97110 THERAPEUTIC EXERCISES: CPT

## 2022-11-23 NOTE — PROGRESS NOTES
[facility] - [facility], [address] [place] [address]  [phone] fax    Progress Note  Patient name: Kin Sellers Start of Care: 22   Referral source: Kaylan Desir MD : 1970   Medical/Treatment Diagnosis: Pain in left knee [M25.562]  Primary osteoarthritis of left knee [M17.12]  Payor: [employer] / Plan: [address] / Product Type: Managed Care Medicaid /  Onset Date:22      Prior Hospitalization: see medical history Provider#: [id number]   Medications: Verified on Patient Summary List     Comorbidities: Arthritis; BMI > 30  Prior Level of Function:In [employer] school to become ; lives in [facility] home with jackelyn Duff; use of SPC in home, community    Visits from [place] of Care: 20    Missed Visits: 2    Goal: Pt to increase left knee AROM to 0 to 130 deg without increased pain for ease of squatting and stair negotiation. Status at last note/certification: -2-025 deg   Current: progressing - left knee AROM -6 to 128 deg still (22)  Goal: Pt to increase left hip ER strength to 4+/5 grossly to increase stability with gait and stair negotiation. Status at last note/certification: 4/   Current: met - 4+/5 left hip ER  Goal: Pt to report < 3/10 pain at worst to increase ease with ADLs. Status at last note/certification: 4 pain at worst with prolonged sitting  Current: progressing - 3-4/10 pain at worst (22)  Goal: Pt to report FOTO score of 72 pts to show improved function and quality of life. Status at last note/certification: FOTO 63 points. Pt denies any decreases in function. Current: will reassess next visit  Goal: Patient  will be independent  with comprehensive updated HEP to continue to manage care independently after discharge.    Status at last note/certification: Pt received new HEP exercises to improve knee extension and normalize gait pattern, will plan on updating at d/c Current: met - Pt compliant with updated HEP given on 11/9/22 without issue (11/23/22)    Key Functional Changes: Pt has attended skilled therapy for 20 visits to address left knee pain, impaired knee strength, stability, and gait deviations. Pt exhibits objective gains in left hip/knee strength with minor deficits noted of 4+/5 in left hip ER with MMT, improved left knee flexion AROM, and reduction of overall pain levels to 4/10 at worst.  Pt reports ability to perform most ADLs without issue but is still challenged with negotating stairs - step to pattern with decreased stance time on left LE and early heel rise left with descending stairs - and ambulating community distances without SPC, exhibiting mild limp left LE. Decreased left single limb stability noted with impaired SLS of 7 seconds compared to 18 seconds right and inability to perform single limb squat, which would impact ability to descend stairs normally. Pt would benefit from continued skilled therapy for one more month, at a rate of 1x/week to further address left concentric/eccentric strength of left knee and improve single limb stability to increase ease of stairs, gait without A.D and prepare to transition Pt towards independent management with updated HEP. Updated Goals: to be achieved in 5 weeks:  Goal: Pt to increase left knee AROM to 0 to 130 deg without increased pain for ease of squatting and stair negotiation. Status at last note/certification:  left knee AROM -6 to 128 deg still (11/23/22)  Current:   Goal: Pt to increase left SLS to 15 seconds without deviations to increase stability with gait and stair negotiation. Status at last note/certification: 7 seconds left   Goal: Pt to report < 3/10 pain at worst to increase ease with ADLs. Status at last note/certification:3-4/10 pain at worst (11/23/22)  Current:   Goal: Pt to report FOTO score of 72 pts to show improved function and quality of life.   Status at last note/certification: FOTO 63 points. Pt denies any decreases in function. Current:   Goal: Patient will be able to negotiate stairs with reciprocal gait pattern without deviations for ease entering/exiting home, navigating inside home. Status at last note/certification: step to pattern with decreased stance time left LE, left early heel rise with descension (11/23/22)  Current:     ASSESSMENT/RECOMMENDATIONS:  [x]Continue therapy per initial plan/protocol at a frequency of  1 x per week for 5 weeks  []Continue therapy with the following recommended changes:_____________________      _____________________________________________________________________  []Discontinue therapy progressing towards or have reached established goals  []Discontinue therapy due to lack of appreciable progress towards goals  []Discontinue therapy due to lack of attendance or compliance  []Await Physician's recommendations/decisions regarding therapy  []Other:________________________________________________________________    Thank you for this referral.   Zana Darby, PT 11/23/2022 1:31 PM  NOTE TO PHYSICIAN:  [address] AND   FAX TO [facility]: [phone]  If you are unable to process this request in 24 hours please contact our office: 21 [phone]  []  I have read the above report and request that my patient continue as recommended. []  I have read the above report and request that my patient continue therapy with the following changes/special instructions:________________________________________  []I have read the above report and request that my patient be discharged from therapy.     [phone] Signature:____________Date:_________TIME:________    [facility], Date and Time must be completed for valid certification **

## 2022-11-23 NOTE — PROGRESS NOTES
PT DAILY TREATMENT NOTE     Patient Name: Yana Gastelum  Date:2022  : 1970  [x]  Patient  Verified  Payor: [employer] / Plan: [address] / Product Type: Managed Care Medicaid /    In time: 9:20   Out time: 10:20  Total Treatment Time (min): 60  Visit #: 2 of 2    Treatment Area: Pain in left knee [M25.562]  Primary osteoarthritis of left knee [M17.12]    SUBJECTIVE  Pain Level (0-10 scale): 3/10  Any medication changes, allergies to medications, adverse drug reactions, diagnosis change, or new procedure performed?: [x] No    [] Yes (see summary sheet for update)  Subjective functional status/changes:   [] No changes reported  \"I've been trying to walk more without the cane. It's weird but I keep trying. \"     OBJECTIVE    8 min Therapeutic Exercise:  [] See flow sheet :   Rationale: increase strength and improve coordination to improve the patients ability to increase standing/amb tolerance with normalized gait pattern    27 min Therapeutic Activity:  [x] See flow sheet : walks, stair negotiation training, goals reassessment   Rationale: increase ROM and increase strength to improve the patients ability to tolerate daily tasks. 15 min Self Care/Home Management  [x]  See flow sheet : pt education on continued compliance with HEP and potential transition to local gym after therapy to further improve left knee stability; difference between cortisone and gel injections to knee   Rationale: increase strength, improve coordination, and improve balance  to improve the patients ability to transfer with less pain. 10 min Neuromuscular Re-education:  [x]  See flow sheet : glute/quad/hip stabilization exercises   Rationale: improve coordination, improve balance, and increase proprioception  to improve ease of ADls.           With   [x] TE   [x] TA   [x] neuro   [] other: Patient Education: [x] Review HEP    [] Progressed/Changed HEP based on:   [] positioning   [] body mechanics   [] transfers   [] heat/ice application    [] other:      Other Objective/Functional Measures: Performed exercises per flow sheet. Reassessed goals for progress note. Pain Level (0-10 scale) post treatment: 1/10    ASSESSMENT/Changes in Function: Pt has attended skilled therapy for 20 visits to address left knee pain, impaired knee strength, stability, and gait deviations. Pt exhibits objective gains in left hip/knee strength with minor deficits noted of 4+/5 in left hip ER with MMT, improved left knee flexion AROM, and reduction of overall pain levels to 4/10 at worst.  Pt reports ability to perform most ADLs without issue but is still challenged with negotating stairs - step to pattern with decreased stance time on left LE and early heel rise left with descending stairs - and ambulating community distances without SPC, exhibiting mild limp left LE. Decreased left single limb stability noted with impaired SLS of 7 seconds compared to 18 seconds right and inability to perform single limb squat, which would impact ability to descend stairs normally. Pt would benefit from continued skilled therapy for one more month, at a rate of 1x/week to further address left concentric/eccentric strength of left knee and improve single limb stability to increase ease of stairs, gait without A.D and prepare to transition Pt towards independent management with updated HEP. Patient will continue to benefit from skilled PT services to modify and progress therapeutic interventions, address functional mobility deficits, address ROM deficits, address strength deficits, analyze and address soft tissue restrictions, analyze and cue movement patterns, analyze and modify body mechanics/ergonomics, and assess and modify postural abnormalities to attain remaining goals.      []  See Plan of Care  [x]  See progress note/recertification  []  See Discharge Summary         Progress towards goals / Updated goals:  Goal: Pt to increase left knee AROM to 0 to 130 deg without increased pain for ease of squatting and stair negotiation. Status at last note/certification: -3-088 deg   Current: progressing - left knee AROM -6 to 128 deg still (11/23/22)  Goal: Pt to increase left hip ER strength to 4+/5 grossly to increase stability with gait and stair negotiation. Status at last note/certification: 4/5   Goal: Pt to report < 3/10 pain at worst to increase ease with ADLs. Status at last note/certification: 2/24 pain at worst with prolonged sitting  Current: progressing - 3-4/10 pain at worst (11/23/22)  Goal: Pt to report FOTO score of 72 pts to show improved function and quality of life. Status at last note/certification: FOTO 63 points. Pt denies any decreases in function. Current: will reassess next visit  Goal: Patient  will be independent  with comprehensive updated HEP to continue to manage care independently after discharge. Status at last note/certification: Pt received new HEP exercises to improve knee extension and normalize gait pattern, will plan on updating at d/c   Current: met - Pt compliant with updated HEP given on 11/9/22 without issue (11/23/22)     PLAN  [x]  Upgrade activities as tolerated     [x]  Continue plan of care  [x]  Update interventions per flow sheet       []  Discharge due to:_  []  Other:_      Haily Darby, PT 11/23/2022  10:05 AM    No future appointments.

## 2022-12-05 ENCOUNTER — HOSPITAL ENCOUNTER (OUTPATIENT)
Dept: PHYSICAL THERAPY | Age: 52
Discharge: HOME | End: 2022-12-05
Attending: SPECIALIST
Payer: COMMERCIAL

## 2022-12-05 PROCEDURE — 97112 NEUROMUSCULAR REEDUCATION: CPT

## 2022-12-05 PROCEDURE — 97530 THERAPEUTIC ACTIVITIES: CPT

## 2022-12-05 PROCEDURE — 97110 THERAPEUTIC EXERCISES: CPT

## 2022-12-05 NOTE — PROGRESS NOTES
PT DAILY TREATMENT NOTE     Patient Name: Ludwin Luna  Date:2022  : 1970  [x]  Patient  Verified  Payor: Darin 22 / Plan: [address] / Product Type: Managed Care Medicaid /    In time:11:40  Out time:12:23  Total Treatment Time (min): 43  Visit #: 1 of 5    Medicare/BCBS Only   Total Timed Codes (min):   1:1 Treatment Time:         Treatment Area: Pain in left knee [M25.562]  Primary osteoarthritis of left knee [M17.12]    SUBJECTIVE  Pain Level (0-10 scale): 3  Any medication changes, allergies to medications, adverse drug reactions, diagnosis change, or new procedure performed?: [x] No    [] Yes (see summary sheet for update)  Subjective functional status/changes:   [] No changes reported  I want to build muscles  I need the strength     OBJECTIVE      13 min Therapeutic Exercise:  [] See flow sheet :   Rationale: increase ROM and increase strength to improve the patients ability to standing/amb tolerance with normalized gait pattern    10 min Therapeutic Activity:  []  See flow sheet :   Rationale: increase strength, improve coordination, and increase proprioception  to improve the patients ability to increase eccentric quad strength for stair descent, squats     20 min Neuromuscular Re-education:  []  See flow sheet :   Rationale: improve coordination, improve balance, and increase proprioception  to improve the patients ability to perform ADL's    With   [] TE   [] TA   [] neuro   [] other: Patient Education: [x] Review HEP    [] Progressed/Changed HEP based on:   [] positioning   [] body mechanics   [] transfers   [] heat/ice application    [] other:      Other Objective/Functional Measures: ex's per card     Pain Level (0-10 scale) post treatment: 2    ASSESSMENT/Changes in Function: pt seen today to address Pain in left knee [M25.562], Primary osteoarthritis of left knee [M17.12]. Demonstrates moderate difficulty with eccentric control on BOSU squats. Frequent UE balance checks for stability    Patient will continue to benefit from skilled PT services to modify and progress therapeutic interventions, address functional mobility deficits, address ROM deficits, address strength deficits, analyze and address soft tissue restrictions, analyze and cue movement patterns, analyze and modify body mechanics/ergonomics, assess and modify postural abnormalities, address imbalance/dizziness, and instruct in home and community integration to attain remaining goals. []  See Plan of Care  []  See progress note/recertification  []  See Discharge Summary         Progress towards goals / Updated goals:  Goal: Pt to increase left knee AROM to 0 to 130 deg without increased pain for ease of squatting and stair negotiation. Status at last note/certification:  left knee AROM -6 to 128 deg still (11/23/22)  Current:   Goal: Pt to increase left SLS to 15 seconds without deviations to increase stability with gait and stair negotiation. Status at last note/certification: 7 seconds left   Goal: Pt to report < 3/10 pain at worst to increase ease with ADLs. Status at last note/certification:3-4/10 pain at worst (11/23/22)  Current:   Goal: Pt to report FOTO score of 72 pts to show improved function and quality of life. Status at last note/certification: FOTO 63 points. Pt denies any decreases in function. Current:   Goal: Patient will be able to negotiate stairs with reciprocal gait pattern without deviations for ease entering/exiting home, navigating inside home.   Status at last note/certification: step to pattern with decreased stance time left LE, left early heel rise with descension (11/23/22)  Current:     PLAN  [x]  Upgrade activities as tolerated     []  Continue plan of care  []  Update interventions per flow sheet       []  Discharge due to:_  []  Other:_      Valerie Casanova, PTA 12/5/2022  11:38 AM    Future Appointments   Date Time Provider Marco Sellers   12/5/2022 12:00 PM [facility]   12/7/2022 12:00 PM [facility]   12/12/2022 11:15 AM You Aldana, [facility]   12/14/2022 10:30 AM You Aldana, [facility]   12/19/2022 11:15 AM You Aldana, [facility]   12/21/2022 11:15 AM You Aldana, [facility]   12/28/2022 11:15 AM Ivan Darby, [facility]   12/30/2022 11:15 AM You Aldana, [facility]

## 2022-12-07 ENCOUNTER — HOSPITAL ENCOUNTER (OUTPATIENT)
Dept: PHYSICAL THERAPY | Age: 52
Discharge: HOME | End: 2022-12-07
Attending: SPECIALIST
Payer: COMMERCIAL

## 2022-12-07 PROCEDURE — 97530 THERAPEUTIC ACTIVITIES: CPT

## 2022-12-07 PROCEDURE — 97112 NEUROMUSCULAR REEDUCATION: CPT

## 2022-12-07 PROCEDURE — 97110 THERAPEUTIC EXERCISES: CPT

## 2022-12-07 NOTE — PROGRESS NOTES
PT DAILY TREATMENT NOTE     Patient Name: Emmanuel Moyer  Date:2022  : 1970  [x]  Patient  Verified  Payor: Darin 22 / Plan: [address] / Product Type: Managed Care Medicaid /    In time:12:00  Out time:12:45  Total Treatment Time (min): 45  Visit #: 2 of 5    Medicare/BCBS Only   Total Timed Codes (min):   1:1 Treatment Time:         Treatment Area: Pain in left knee [M25.562]  Primary osteoarthritis of left knee [M17.12]    SUBJECTIVE  Pain Level (0-10 scale): 0  Any medication changes, allergies to medications, adverse drug reactions, diagnosis change, or new procedure performed?: [x] No    [] Yes (see summary sheet for update)  Subjective functional status/changes:   [] No changes reported      OBJECTIVE    10 min Therapeutic Exercise:  [] See flow sheet :   Rationale: increase ROM and increase strength to improve the patients ability to perform daily tasks,     10 min Therapeutic Activity:  []  See flow sheet :   Rationale: increase strength and improve coordination  to improve the patients ability to perform transfers, stairs     25 min Neuromuscular Re-education:  []  See flow sheet :   Rationale: increase strength, improve coordination, improve balance, and increase proprioception  to improve the patients ability to increase knee stability and stair negotiation          With   [] TE   [] TA   [] neuro   [] other: Patient Education: [x] Review HEP    [] Progressed/Changed HEP based on:   [] positioning   [] body mechanics   [] transfers   [] heat/ice application    [] other:      Other Objective/Functional Measures: wall squats with SB     Pain Level (0-10 scale) post treatment: 0    ASSESSMENT/Changes in Function: Pt seen today to address left knee stability and function for ease of stairs, recreational tasks. Quad fatigue with wall squats, limited to at most 10 seconds.     Patient will continue to benefit from skilled PT services to modify and progress therapeutic interventions, address functional mobility deficits, address ROM deficits, address strength deficits, analyze and address soft tissue restrictions, analyze and cue movement patterns, analyze and modify body mechanics/ergonomics, assess and modify postural abnormalities, address imbalance/dizziness, and instruct in home and community integration to attain remaining goals. []  See Plan of Care  []  See progress note/recertification  []  See Discharge Summary         Progress towards goals / Updated goals:  Goal: Pt to increase left knee AROM to 0 to 130 deg without increased pain for ease of squatting and stair negotiation. Status at last note/certification:  left knee AROM -6 to 128 deg still (11/23/22)  Current:   Goal: Pt to increase left SLS to 15 seconds without deviations to increase stability with gait and stair negotiation. Status at last note/certification: 7 seconds left   Goal: Pt to report < 3/10 pain at worst to increase ease with ADLs. Status at last note/certification:3-4/10 pain at worst (11/23/22)  Current:   Goal: Pt to report FOTO score of 72 pts to show improved function and quality of life. Status at last note/certification: FOTO 63 points. Pt denies any decreases in function. Current:   Goal: Patient will be able to negotiate stairs with reciprocal gait pattern without deviations for ease entering/exiting home, navigating inside home.   Status at last note/certification: step to pattern with decreased stance time left LE, left early heel rise with descension (11/23/22)  Current:     PLAN  [x]  Upgrade activities as tolerated     []  Continue plan of care  []  Update interventions per flow sheet       []  Discharge due to:_  []  Other:_      Emery Patel PTA 12/7/2022  12:17 PM    Future Appointments   Date Time Provider Marco Sellers   12/12/2022 11:15 AM Bret Duarte [facility]   12/14/2022 10:30 AM Bret Duarte [facility]   12/19/2022 11:15 AM Sen Zambrano, [facility]   12/21/2022 11:15 AM Sen Zambrano, [facility]   12/28/2022 11:15 AM Micki Darby, [facility]   12/30/2022 11:15 AM Sen Zambrano, [facility]

## 2022-12-12 ENCOUNTER — HOSPITAL ENCOUNTER (OUTPATIENT)
Dept: PHYSICAL THERAPY | Age: 52
Discharge: HOME | End: 2022-12-12
Attending: SPECIALIST
Payer: COMMERCIAL

## 2022-12-12 PROCEDURE — 97112 NEUROMUSCULAR REEDUCATION: CPT

## 2022-12-12 PROCEDURE — 97110 THERAPEUTIC EXERCISES: CPT

## 2022-12-12 PROCEDURE — 97530 THERAPEUTIC ACTIVITIES: CPT

## 2022-12-12 NOTE — PROGRESS NOTES
PT DAILY TREATMENT NOTE     Patient Name: Gerald Peña  Date:2022  : 1970  [x]  Patient  Verified  Payor: Darin 22 / Plan: [address] / Product Type: Managed Care Medicaid /    In time:11:15  Out time:12:00  Total Treatment Time (min): 45  Visit #: 3 of 5    Medicare/BCBS Only   Total Timed Codes (min):   1:1 Treatment Time:         Treatment Area: Pain in left knee [M25.562]  Primary osteoarthritis of left knee [M17.12]    SUBJECTIVE  Pain Level (0-10 scale): 0  Any medication changes, allergies to medications, adverse drug reactions, diagnosis change, or new procedure performed?: [x] No    [] Yes (see summary sheet for update)  Subjective functional status/changes:   [] No changes reported  I'm walking without my cane and I'm not in any pain. OBJECTIVE    15 min Therapeutic Exercise:  [] See flow sheet :   Rationale: increase ROM and increase strength to improve the patients ability to bend, lift and squat. 10 min Therapeutic Activity:  []  See flow sheet :   Rationale: increase strength, improve coordination, and improve balance  to improve the patients ability to bend, lift and squat. 20 min Neuromuscular Re-education:  []  See flow sheet :   Rationale: improve coordination, improve balance, and increase proprioception  to improve the patients ability to maintain balance in SLS and decrease the risk of falls. With   [x] TE   [x] TA   [x] neuro   [] other: Patient Education: [x] Review HEP    [] Progressed/Changed HEP based on:   [] positioning   [] body mechanics   [] transfers   [] heat/ice application    [] other:      Other Objective/Functional Measures: exercises per chart. Pain Level (0-10 scale) post treatment: 1    ASSESSMENT/Changes in Function: Pt reports to skilled therapy session with decreased functional ROM in the left LE, and decreased standing endurance.  Pt required bilateral UE assistance during eccentric step downs in left single legs stance. Pt is making progress towards his LTGs as shown by his increased time in SLS indicating improved static standing balance. Session tolerated well with all post treatment modalities declined. Patient will continue to benefit from skilled PT services to modify and progress therapeutic interventions, address functional mobility deficits, address ROM deficits, address strength deficits, analyze and address soft tissue restrictions, analyze and cue movement patterns, analyze and modify body mechanics/ergonomics, and assess and modify postural abnormalities to attain remaining goals. [x]  See Plan of Care  []  See progress note/recertification  []  See Discharge Summary         Progress towards goals / Updated goals:  Goal: Pt to increase left knee AROM to 0 to 130 deg without increased pain for ease of squatting and stair negotiation. Status at last note/certification:  left knee AROM -6 to 128 deg still (11/23/22)  Current:   Goal: Pt to increase left SLS to 15 seconds without deviations to increase stability with gait and stair negotiation. Status at last note/certification: 7 seconds left   Current: 13 seconds. (12/12/2022)  Goal: Pt to report < 3/10 pain at worst to increase ease with ADLs. Status at last note/certification:3-4/10 pain at worst (11/23/22)  Current:   Goal: Pt to report FOTO score of 72 pts to show improved function and quality of life. Status at last note/certification: FOTO 63 points. Pt denies any decreases in function. Current:   Goal: Patient will be able to negotiate stairs with reciprocal gait pattern without deviations for ease entering/exiting home, navigating inside home. Status at last note/certification: step to pattern with decreased stance time left LE, left early heel rise with descension (11/23/22)  Current: Step over pattern with 0 UE assistance, decreased stance time on left.  (12/12/2022)    PLAN  [x]  Upgrade activities as tolerated     [x] Continue plan of care  []  Update interventions per flow sheet       []  Discharge due to:_  []  Other:_      Carlos Finch, [facility] 12/12/2022  11:16 AM    Future Appointments   Date Time Provider Marco Sellers   12/14/2022 10:30 AM Caleb Dumont [facility]   12/19/2022 11:15 AM Caleb Dumont [facility]   12/21/2022 11:15 AM Caleb Dumont [facility]   12/28/2022 11:15 [facility]   12/30/2022 11:15 [facility]

## 2022-12-14 ENCOUNTER — HOSPITAL ENCOUNTER (OUTPATIENT)
Dept: PHYSICAL THERAPY | Age: 52
Discharge: HOME | End: 2022-12-14
Attending: SPECIALIST
Payer: COMMERCIAL

## 2022-12-14 PROCEDURE — 97530 THERAPEUTIC ACTIVITIES: CPT

## 2022-12-14 PROCEDURE — 97110 THERAPEUTIC EXERCISES: CPT

## 2022-12-14 PROCEDURE — 97112 NEUROMUSCULAR REEDUCATION: CPT

## 2022-12-14 NOTE — PROGRESS NOTES
PT DAILY TREATMENT NOTE     Patient Name: Cherelle Juarez  Date:2022  : 1970  [x]  Patient  Verified  Payor: Darin Jeter / Plan: [address] / Product Type: Managed Care Medicaid /    In time:10:31  Out time:11:15  Total Treatment Time (min): 44  Visit #: 4 of 5    Medicare/BCBS Only   Total Timed Codes (min):   1:1 Treatment Time:         Treatment Area: Pain in left knee [M25.562]  Primary osteoarthritis of left knee [M17.12]    SUBJECTIVE  Pain Level (0-10 scale): 1  Any medication changes, allergies to medications, adverse drug reactions, diagnosis change, or new procedure performed?: [x] No    [] Yes (see summary sheet for update)  Subjective functional status/changes:   [] No changes reported  I'm feeling pretty good today. OBJECTIVE    14 min Therapeutic Exercise:  [] See flow sheet :   Rationale: increase ROM and increase strength to improve the patients ability to bend, lift and squat. 10 min Therapeutic Activity:  []  See flow sheet :   Rationale: increase strength, improve coordination, and improve balance  to improve the patients ability to negotiate stairs. 20 min Neuromuscular Re-education:  []  See flow sheet :   Rationale: improve coordination, improve balance, and increase proprioception  to improve the patients ability to maintain balance in SLS and decrease the risk of falls. With   [x] TE   [x] TA   [x] neuro   [] other: Patient Education: [x] Review HEP    [] Progressed/Changed HEP based on:   [] positioning   [] body mechanics   [] transfers   [] heat/ice application    [] other:      Other Objective/Functional Measures: functional gains decreased reliance on SPC, decrease pain levels with ADLs. Pain Level (0-10 scale) post treatment: 1    ASSESSMENT/Changes in Function: Pt reports to skilled therapy session with decreased functional strength and ROM in the left LE.  Pt tolerated the addition of reformer LE series with verbal cues provided to promote proper hand placement. Pt has achieved 1 of his LTGs and reports a pain level no greater than 2/10 this past week. Pt demonstrated good standing balance during bosu-squats and did not require UE assistance. Minor increases in left knee pain were experienced during walking lunges with patient noting improvement following rest. Session tolerated well. Patient will continue to benefit from skilled PT services to modify and progress therapeutic interventions, address functional mobility deficits, address ROM deficits, address strength deficits, analyze and address soft tissue restrictions, analyze and cue movement patterns, analyze and modify body mechanics/ergonomics, and assess and modify postural abnormalities to attain remaining goals. [x]  See Plan of Care  []  See progress note/recertification  []  See Discharge Summary         Progress towards goals / Updated goals:  Goal: Pt to increase left knee AROM to 0 to 130 deg without increased pain for ease of squatting and stair negotiation. Status at last note/certification:  left knee AROM -6 to 128 deg still (11/23/22)  Current: -6- 125 (12/14/2022)  Goal: Pt to increase left SLS to 15 seconds without deviations to increase stability with gait and stair negotiation. Status at last note/certification: 7 seconds left   Current: 13 seconds. (12/12/2022)  Goal: Pt to report < 3/10 pain at worst to increase ease with ADLs. Status at last note/certification:3-4/10 pain at worst (11/23/22)  Current: 2/10 pain at worst. (12/14/2022)  Goal: Pt to report FOTO score of 72 pts to show improved function and quality of life. Status at last note/certification: FOTO 63 points. Pt denies any decreases in function. Current:   Goal: Patient will be able to negotiate stairs with reciprocal gait pattern without deviations for ease entering/exiting home, navigating inside home.   Status at last note/certification: step to pattern with decreased stance time left LE, left early heel rise with descension (11/23/22)  Current: Step over pattern with 0 UE assistance, decreased stance time on left.  (12/12/2022)    PLAN  [x]  Upgrade activities as tolerated     [x]  Continue plan of care  []  Update interventions per flow sheet       []  Discharge due to:_  []  Other:_      Estermadina Mojica, \[facility]\"" 12/14/2022  10:35 AM    Future Appointments   Date Time Provider Marco Sellers   12/20/2022 10:30 AM Delfina Herrera, PT [facility]   12/21/2022 11:15 AM Christi Drake PTA [facility]   12/28/2022 11:15 AM Mona Darby, PT [facility]   12/30/2022 11:15 AM Christi Drake PTA [facility]

## 2022-12-19 ENCOUNTER — APPOINTMENT (OUTPATIENT)
Dept: PHYSICAL THERAPY | Age: 52
End: 2022-12-19
Attending: SPECIALIST
Payer: COMMERCIAL

## 2022-12-20 ENCOUNTER — HOSPITAL ENCOUNTER (OUTPATIENT)
Dept: PHYSICAL THERAPY | Age: 52
Discharge: HOME | End: 2022-12-20
Attending: SPECIALIST
Payer: COMMERCIAL

## 2022-12-20 PROCEDURE — 97535 SELF CARE MNGMENT TRAINING: CPT

## 2022-12-20 PROCEDURE — 97110 THERAPEUTIC EXERCISES: CPT

## 2022-12-20 PROCEDURE — 97530 THERAPEUTIC ACTIVITIES: CPT

## 2022-12-20 NOTE — PROGRESS NOTES
PT DISCHARGE DAILY NOTE AND [id number]    Patient name: Mabel Mcfarland Start of Care: 22   Referral source: Omar Dupree MD : 1970   Medical/Treatment Diagnosis: Pain in left knee [M25.562]  Primary osteoarthritis of left knee [M17.12]  Payor: Darin Jeter / Plan: [address] / Product Type: Managed Care Medicaid /  Onset Date:22      Prior Hospitalization: see medical history Provider#: [id number]   Medications: Verified on Patient Summary List     Comorbidities: Arthritis; BMI > 30  Prior Level of Function:In [employer] to become ; lives in [facility] [place] [facility] [place] with jackelyn Garcia; use of SPC in home, community  Vis[facility] from [facility]: 25    Missed Visits: 2  Reporting Period : 2022 to 2022    Date:2022  : 1970  [x]  Patient  Verified  Payor: S[employer]lana[employer] Chip  / Plan: [address] / Product Type: Managed Care Medicaid /    In time: 10:33   Out time:11:09  Total Treatment Time (min): 36  Visit #: 5 of 5    SUBJECTIVE  Pain Level (0-10 scale): 0  Any medication changes, allergies to medications, adverse drug reactions, diagnosis change, or new procedure performed?: [x] No    [] Yes (see summary sheet for update)  Subjective functional status/changes:   [] No changes reported  Pt reports no pain today. OBJECTIVE    13 min Therapeutic Activity:  []  See flow sheet : goal assessment, FOTO   Rationale: increase strength, improve coordination, and increase proprioception  to improve the patients ability to tolerate ADLs. 15 min Therapeutic Exercise  [x]  See flow sheet : elliptical, HEP instruct along with performing gym activities for symptom management   Rationale: increase ROM and increase strength  to improve the patients ability to tolerate daily tasks.      8 min Self Care/Home Management:  []  See flow sheet : d/c instructions, pt education on starting exercises at the gym with lighter weights and work his way up to heavier weights, pt education to use pain as a guide and to not push through pain with exercises. Rationale: increase ROM and increase strength  to improve the patients ability to tolerate daily tasks. With   [x] TE   [x] TA   [x] neuro   [x] other: Self Care/Home Management Patient Education: [x] Review HEP    [] Progressed/Changed HEP based on:   [x] positioning   [x] body mechanics   [] transfers   [] heat/ice application    [] other:      Other Objective/Functional Measures: See goals below. Performed elliptical with no pain today. Functional Gains: pain, ROM, mobility  Functional Deficits: cold weather  Pain      Best: 0/10     Worst: 1.5/10    Pain Level (0-10 scale) post treatment: 0    Summary of Care:  Goal: Pt to increase left knee AROM to 0 to 130 deg without increased pain for ease of squatting and stair negotiation. Status at last note/certification:  left knee AROM -6 to 128 deg still (11/23/22)  Current: not met, 7-120 deg 12/20/2022  Goal: Pt to increase left SLS to 15 seconds without deviations to increase stability with gait and stair negotiation. Status at last note/certification: 7 seconds left   Current: MET,  15 seconds 12/20/2022  Goal: Pt to report < 3/10 pain at worst to increase ease with ADLs. Status at last note/certification:3-4/10 pain at worst (11/23/22)  Current: MET, 1.5/10 pain at worst 12/20/2022  Goal: Pt to report FOTO score of 72 pts to show improved function and quality of life. Status at last note/certification: FOTO 63 points. Pt denies any decreases in function. Current: not met, 65 points 12/20/2022   Goal: Patient will be able to negotiate stairs with reciprocal gait pattern without deviations for ease entering/exiting home, navigating inside home.   Status at last note/certification: step to pattern with decreased stance time left LE, left early heel rise with descension (11/23/22)  Current: progressing, states this is improving at home 12/20/2022    ASSESSMENT/Changes in Function:   Pt was seen for 25 therapy sessions. Pt demonstrated/reported improvements in ROM, pain, and mobility since starting therapy. Pt educated to continue HEP with gym exercises for symptom management. Pt is d/c'ed from therapy at this time to HEP secondary to improvement in pain/symptoms and ability to independently perform HEP to manage current deficits. Thank you for this referral!      PLAN  [x]Discontinue therapy: [x]Patient has reached or is progressing toward set goals      []Patient is non-compliant or has abdicated      []Due to lack of appreciable progress towards set goals    Monica Garcia, PT 12/20/2022  10:26 AM    NOTE TO PHYSICIAN:  PLEASE COMPLETE THE ORDERS BELOW AND   FAX TO [facility]: [phone]  If you are unable to process this request in 24 hours please contact our office: 21 [phone]  []  I have read the above report and request that my patient continue as recommended. []  I have read the above report and request that my patient continue therapy with the following changes/special instructions:________________________________________  []I have read the above report and request that my patient be discharged from therapy.      Physician's Signature:____________Date:_________TIME:________

## 2022-12-20 NOTE — PROGRESS NOTES
Physical Therapy Discharge Instructions      [facility] - [facility], [address] [place] [address]  [phone] fax     Patient: Bartolo Huerta  : 1970      Continue Home Exercise Program 4-7 times per week      Continue with    [x] Ice/Heat  as needed       Follow up with MD:     [x] As needed       Recommendations:   [x]   Return to activity with home program  [x]   Return to activity with the following modifications:  [x]Return to/join local gym        Kristine Fung, PT 2022 10:50 AM

## 2022-12-21 ENCOUNTER — APPOINTMENT (OUTPATIENT)
Dept: PHYSICAL THERAPY | Age: 52
End: 2022-12-21
Attending: SPECIALIST
Payer: COMMERCIAL

## 2022-12-27 ENCOUNTER — APPOINTMENT (OUTPATIENT)
Dept: PHYSICAL THERAPY | Age: 52
End: 2022-12-27
Attending: SPECIALIST
Payer: COMMERCIAL

## 2022-12-28 ENCOUNTER — APPOINTMENT (OUTPATIENT)
Dept: PHYSICAL THERAPY | Age: 52
End: 2022-12-28
Attending: SPECIALIST
Payer: COMMERCIAL

## 2022-12-30 ENCOUNTER — APPOINTMENT (OUTPATIENT)
Dept: PHYSICAL THERAPY | Age: 52
End: 2022-12-30
Attending: SPECIALIST
Payer: COMMERCIAL

## 2023-01-08 ENCOUNTER — APPOINTMENT (OUTPATIENT)
Dept: GENERAL RADIOLOGY | Age: 53
End: 2023-01-08
Attending: PHYSICIAN ASSISTANT
Payer: COMMERCIAL

## 2023-01-08 ENCOUNTER — HOSPITAL ENCOUNTER (EMERGENCY)
Age: 53
Discharge: HOME | End: 2023-01-08
Attending: EMERGENCY MEDICINE
Payer: COMMERCIAL

## 2023-01-08 VITALS
HEART RATE: 70 BPM | SYSTOLIC BLOOD PRESSURE: 136 MMHG | DIASTOLIC BLOOD PRESSURE: 75 MMHG | OXYGEN SATURATION: 100 % | RESPIRATION RATE: 16 BRPM | BODY MASS INDEX: 32.93 KG/M2 | TEMPERATURE: 98.2 F | WEIGHT: 230 LBS | HEIGHT: 70 IN

## 2023-01-08 DIAGNOSIS — G89.29 CHRONIC PAIN OF LEFT KNEE: ICD-10-CM

## 2023-01-08 DIAGNOSIS — M17.32 POST-TRAUMATIC OSTEOARTHRITIS OF LEFT KNEE: ICD-10-CM

## 2023-01-08 DIAGNOSIS — R68.89 FLU-LIKE SYMPTOMS: Primary | ICD-10-CM

## 2023-01-08 DIAGNOSIS — M25.562 CHRONIC PAIN OF LEFT KNEE: ICD-10-CM

## 2023-01-08 LAB
FLUAV RNA SPEC QL NAA+PROBE: NOT DETECTED
FLUBV RNA SPEC QL NAA+PROBE: NOT DETECTED
SARS-COV-2, COV2: NOT DETECTED

## 2023-01-08 PROCEDURE — 87636 SARSCOV2 & INF A&B AMP PRB: CPT

## 2023-01-08 PROCEDURE — 99284 EMERGENCY DEPT VISIT MOD MDM: CPT

## 2023-01-08 PROCEDURE — 74011250637 HC RX REV CODE- 250/637: Performed by: PHYSICIAN ASSISTANT

## 2023-01-08 PROCEDURE — 73560 X-RAY EXAM OF KNEE 1 OR 2: CPT

## 2023-01-08 PROCEDURE — 71045 X-RAY EXAM CHEST 1 VIEW: CPT

## 2023-01-08 RX ORDER — IBUPROFEN 600 MG/1
600 TABLET ORAL
Status: DISCONTINUED | OUTPATIENT
Start: 2023-01-08 | End: 2023-01-09 | Stop reason: HOSPADM

## 2023-01-08 RX ORDER — MELOXICAM 7.5 MG/1
7.5 TABLET ORAL DAILY
Qty: 30 TABLET | Refills: 0 | Status: SHIPPED | OUTPATIENT
Start: 2023-01-08

## 2023-01-08 RX ORDER — OXYCODONE AND ACETAMINOPHEN 5; 325 MG/1; MG/1
1 TABLET ORAL
Qty: 16 TABLET | Refills: 0 | Status: SHIPPED | OUTPATIENT
Start: 2023-01-08 | End: 2023-01-15

## 2023-01-08 RX ORDER — ACETAMINOPHEN 325 MG/1
325 TABLET ORAL
Status: DISCONTINUED | OUTPATIENT
Start: 2023-01-08 | End: 2023-01-09 | Stop reason: HOSPADM

## 2023-01-08 RX ORDER — OXYCODONE AND ACETAMINOPHEN 5; 325 MG/1; MG/1
1 TABLET ORAL
Status: COMPLETED | OUTPATIENT
Start: 2023-01-08 | End: 2023-01-08

## 2023-01-08 RX ADMIN — OXYCODONE HYDROCHLORIDE AND ACETAMINOPHEN 1 TABLET: 5; 325 TABLET ORAL at 19:13

## 2023-01-08 NOTE — Clinical Note
[address] [place] [address] Dr [facility] EMERGENCY DEPT  [id number] [facility] [id number] [phone]    Work/School Note    Date: 1/8/2023    To Whom It May concern:    Jesus Alberto Moore was seen and treated today in the emergency room by the following provider(s):  Attending Provider: Rola Pierre MD  Physician Assistant: Vivian Kussmaul, [place]. Jesus Alberto Moore is excused from work/school on 1/8/2023 through 1/10/2023. He is medically clear to return to work/school on 1/11/2023.          Sincerely,          Lupe Hollingsworth PA-C

## 2023-01-09 ENCOUNTER — OFFICE VISIT (OUTPATIENT)
Dept: ORTHOPEDIC SURGERY | Age: 53
End: 2023-01-09
Payer: COMMERCIAL

## 2023-01-09 DIAGNOSIS — M25.562 CHRONIC PAIN OF LEFT KNEE: ICD-10-CM

## 2023-01-09 DIAGNOSIS — M24.562 CONTRACTURE, LEFT KNEE: ICD-10-CM

## 2023-01-09 DIAGNOSIS — M17.32 POST-TRAUMATIC OSTEOARTHRITIS OF LEFT KNEE: Primary | ICD-10-CM

## 2023-01-09 DIAGNOSIS — G89.29 CHRONIC PAIN OF LEFT KNEE: ICD-10-CM

## 2023-01-09 PROCEDURE — 20610 DRAIN/INJ JOINT/BURSA W/O US: CPT | Performed by: SPECIALIST

## 2023-01-09 PROCEDURE — 99214 OFFICE O/P EST MOD 30 MIN: CPT | Performed by: SPECIALIST

## 2023-01-09 RX ORDER — BETAMETHASONE SODIUM PHOSPHATE AND BETAMETHASONE ACETATE 3; 3 MG/ML; MG/ML
3 INJECTION, SUSPENSION INTRA-ARTICULAR; INTRALESIONAL; INTRAMUSCULAR; SOFT TISSUE ONCE
Status: COMPLETED | OUTPATIENT
Start: 2023-01-09 | End: 2023-01-09

## 2023-01-09 RX ADMIN — BETAMETHASONE SODIUM PHOSPHATE AND BETAMETHASONE ACETATE 3 MG: 3; 3 INJECTION, SUSPENSION INTRA-ARTICULAR; INTRALESIONAL; INTRAMUSCULAR; SOFT TISSUE at 16:37
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
## 2023-01-09 NOTE — ED NOTES
Pt stated that ibuprofen messes with his stomach. Pt does not want to take \"a whole bunch of pills. Just give me the strongest one. \" Percocet given. Will return tylenol and ibuprofen.

## 2023-01-09 NOTE — DISCHARGE INSTRUCTIONS
[facility] Activation    Thank you for requesting access to [facility]. Please follow the instructions below to securely access and download your online medical record. [facility] allows you to send messages to your doctor, view your test results, renew your prescriptions, schedule appointments, and more. How Do I Sign Up? In your internet browser, go to [email or web address]  Click on the First Time User? Click Here link in the Sign In box. You will be redirect to the New Member Sign Up page. Enter your [facility] Access Code exactly as it appears below. You will not need to use this code after youve completed the sign-up process. If you do not sign up before the expiration date, you must request a new code. [facility] Access Code: [email or web address]  Expires: 2023 10:50 AM (This is the date your [facility] access code will )    Enter the last four digits of your Social Security Number (xxxx) and Date of Birth (mm/dd/yyyy) as indicated and click Submit. You will be taken to the next sign-up page. Create a [facility] ID. This will be your [facility] login ID and cannot be changed, so think of one that is secure and easy to remember. Create a [facility] password. You can change your password at any time. Enter your Password Reset Question and Answer. This can be used at a later time if you forget your password. Enter your e-mail address. You will receive e-mail notification when new information is available in [address] Click Sign Up. You can now view and download portions of your medical record. Click the [place] link to download a portable copy of your medical information. Additional Information    If you have questions, please visit the Frequently Asked Questions section of the [facility] website at [email or web address] Remember, [facility] is NOT to be used for urgent needs. For medical emergencies, dial 911.

## 2023-01-17 DIAGNOSIS — Z01.811 PRE-OP CHEST EXAM: ICD-10-CM

## 2023-01-17 DIAGNOSIS — Z01.810 PREOP CARDIOVASCULAR EXAM: ICD-10-CM

## 2023-01-17 DIAGNOSIS — Z01.818 PREOPERATIVE TESTING: ICD-10-CM

## 2023-01-17 DIAGNOSIS — M17.32 POST-TRAUMATIC OSTEOARTHRITIS OF LEFT KNEE: Primary | ICD-10-CM

## 2023-01-17 DIAGNOSIS — M17.32 POST-TRAUMATIC OSTEOARTHRITIS OF LEFT KNEE: ICD-10-CM

## 2023-01-20 ENCOUNTER — HOSPITAL ENCOUNTER (EMERGENCY)
Age: 53
Discharge: HOME | End: 2023-01-20
Attending: STUDENT IN AN ORGANIZED HEALTH CARE EDUCATION/TRAINING PROGRAM
Payer: COMMERCIAL

## 2023-01-20 VITALS
HEART RATE: 64 BPM | RESPIRATION RATE: 18 BRPM | OXYGEN SATURATION: 96 % | TEMPERATURE: 98.2 F | DIASTOLIC BLOOD PRESSURE: 76 MMHG | SYSTOLIC BLOOD PRESSURE: 119 MMHG

## 2023-01-20 DIAGNOSIS — M17.32 POST-TRAUMATIC OSTEOARTHRITIS OF LEFT KNEE: ICD-10-CM

## 2023-01-20 DIAGNOSIS — M25.562 ACUTE PAIN OF LEFT KNEE: Primary | ICD-10-CM

## 2023-01-20 PROCEDURE — 99284 EMERGENCY DEPT VISIT MOD MDM: CPT

## 2023-01-20 PROCEDURE — 74011250636 HC RX REV CODE- 250/636: Performed by: NURSE PRACTITIONER

## 2023-01-20 PROCEDURE — 96372 THER/PROPH/DIAG INJ SC/IM: CPT

## 2023-01-20 RX ORDER — KETOROLAC TROMETHAMINE 15 MG/ML
15 INJECTION, SOLUTION INTRAMUSCULAR; INTRAVENOUS
Status: COMPLETED | OUTPATIENT
Start: 2023-01-20 | End: 2023-01-20

## 2023-01-20 RX ADMIN — KETOROLAC TROMETHAMINE 15 MG: 15 INJECTION, SOLUTION INTRAMUSCULAR; INTRAVENOUS at 09:18

## 2023-01-20 NOTE — Clinical Note
[address] [place] [address] Dr [facility] EMERGENCY DEPT  [id number] [address] [phone]    Work/School Note    Date: 1/20/2023    To Whom It May concern:    Rama Vaca was seen and treated today in the emergency room by the following provider(s):  Attending Provider: Theodore Lima DO  Nurse Practitioner: SHAMEKA Holland. Rama Vaca is excused from work/school on 01/20/23 and 01/21/23. He is medically clear to return to work/school on 1/22/2023.        Sincerely,          SHAMEKA Cheng
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
## 2023-01-20 NOTE — ED TRIAGE NOTES
Pt sts he is having worsening L knee pain- due for total knee replacement next month.  Currently taking nsaids with no relief

## 2023-02-06 ENCOUNTER — HOSPITAL ENCOUNTER (OUTPATIENT)
Dept: GENERAL RADIOLOGY | Age: 53
Discharge: HOME | End: 2023-02-06
Payer: COMMERCIAL

## 2023-02-06 ENCOUNTER — HOSPITAL ENCOUNTER (OUTPATIENT)
Dept: PREADMISSION TESTING | Age: 53
Discharge: HOME | End: 2023-02-06
Payer: COMMERCIAL

## 2023-02-06 DIAGNOSIS — Z01.818 PREOPERATIVE TESTING: ICD-10-CM

## 2023-02-06 DIAGNOSIS — M17.32 POST-TRAUMATIC OSTEOARTHRITIS OF LEFT KNEE: ICD-10-CM

## 2023-02-06 LAB
ALBUMIN SERPL-MCNC: 4.4 G/DL (ref 3.4–5)
ALBUMIN/GLOB SERPL: 1.2 (ref 0.8–1.7)
ALP SERPL-CCNC: 134 U/L (ref 45–117)
ALT SERPL-CCNC: 23 U/L (ref 16–61)
ANION GAP SERPL CALC-SCNC: 2 MMOL/L (ref 3–18)
APPEARANCE UR: CLEAR
APTT PPP: 31.7 SEC (ref 23–36.4)
AST SERPL-CCNC: 23 U/L (ref 10–38)
ATRIAL RATE: 72 BPM
BASOPHILS # BLD: 0.1 K/UL (ref 0–0.1)
BASOPHILS NFR BLD: 2 % (ref 0–2)
BILIRUB SERPL-MCNC: 1 MG/DL (ref 0.2–1)
BILIRUB UR QL: NEGATIVE
BUN SERPL-MCNC: 15 MG/DL (ref 7–18)
BUN/CREAT SERPL: 12 (ref 12–20)
CALCIUM SERPL-MCNC: 9.7 MG/DL (ref 8.5–10.1)
CALCULATED P AXIS, ECG09: 42 DEGREES
CALCULATED R AXIS, ECG10: -39 DEGREES
CALCULATED T AXIS, ECG11: 30 DEGREES
CHLORIDE SERPL-SCNC: 106 MMOL/L (ref 100–111)
CO2 SERPL-SCNC: 31 MMOL/L (ref 21–32)
COLOR UR: YELLOW
CREAT SERPL-MCNC: 1.24 MG/DL (ref 0.6–1.3)
DIAGNOSIS, 93000: NORMAL
DIFFERENTIAL METHOD BLD: NORMAL
EOSINOPHIL # BLD: 0 K/UL (ref 0–0.4)
EOSINOPHIL NFR BLD: 1 % (ref 0–5)
ERYTHROCYTE [DISTWIDTH] IN BLOOD BY AUTOMATED COUNT: 12.7 % (ref 11.6–14.5)
EST. AVERAGE GLUCOSE BLD GHB EST-MCNC: 117 MG/DL
GLOBULIN SER CALC-MCNC: 3.6 G/DL (ref 2–4)
GLUCOSE SERPL-MCNC: 76 MG/DL (ref 74–99)
GLUCOSE UR STRIP.AUTO-MCNC: NEGATIVE MG/DL
HBA1C MFR BLD: 5.7 % (ref 4.2–5.6)
HCT VFR BLD AUTO: 43.4 % (ref 36–48)
HGB BLD-MCNC: 15.1 G/DL (ref 13–16)
HGB UR QL STRIP: NEGATIVE
IMM GRANULOCYTES # BLD AUTO: 0 K/UL (ref 0–0.04)
IMM GRANULOCYTES NFR BLD AUTO: 0 % (ref 0–0.5)
INR PPP: 1.1 (ref 0.8–1.2)
KETONES UR QL STRIP.AUTO: NEGATIVE MG/DL
LEUKOCYTE ESTERASE UR QL STRIP.AUTO: NEGATIVE
LYMPHOCYTES # BLD: 2.1 K/UL (ref 0.9–3.6)
LYMPHOCYTES NFR BLD: 34 % (ref 21–52)
MCH RBC QN AUTO: 31.5 PG (ref 24–34)
MCHC RBC AUTO-ENTMCNC: 34.8 G/DL (ref 31–37)
MCV RBC AUTO: 90.4 FL (ref 78–100)
MONOCYTES # BLD: 0.6 K/UL (ref 0.05–1.2)
MONOCYTES NFR BLD: 9 % (ref 3–10)
NEUTS SEG # BLD: 3.3 K/UL (ref 1.8–8)
NEUTS SEG NFR BLD: 54 % (ref 40–73)
NITRITE UR QL STRIP.AUTO: NEGATIVE
NRBC # BLD: 0 K/UL (ref 0–0.01)
NRBC BLD-RTO: 0 PER 100 WBC
P-R INTERVAL, ECG05: 146 MS
PH UR STRIP: 7 (ref 5–8)
PLATELET # BLD AUTO: 288 K/UL (ref 135–420)
PMV BLD AUTO: 11.1 FL (ref 9.2–11.8)
POTASSIUM SERPL-SCNC: 4.8 MMOL/L (ref 3.5–5.5)
PROT SERPL-MCNC: 8 G/DL (ref 6.4–8.2)
PROT UR STRIP-MCNC: NEGATIVE MG/DL
PROTHROMBIN TIME: 14.1 SEC (ref 11.5–15.2)
Q-T INTERVAL, ECG07: 366 MS
QRS DURATION, ECG06: 96 MS
QTC CALCULATION (BEZET), ECG08: 400 MS
RBC # BLD AUTO: 4.8 M/UL (ref 4.35–5.65)
SODIUM SERPL-SCNC: 139 MMOL/L (ref 136–145)
SP GR UR REFRACTOMETRY: 1.01 (ref 1–1.03)
UROBILINOGEN UR QL STRIP.AUTO: 1 EU/DL (ref 0.2–1)
VENTRICULAR RATE, ECG03: 72 BPM
WBC # BLD AUTO: 6.1 K/UL (ref 4.6–13.2)

## 2023-02-06 PROCEDURE — 36415 COLL VENOUS BLD VENIPUNCTURE: CPT

## 2023-02-06 PROCEDURE — 85025 COMPLETE CBC W/AUTO DIFF WBC: CPT

## 2023-02-06 PROCEDURE — 80053 COMPREHEN METABOLIC PANEL: CPT

## 2023-02-06 PROCEDURE — 83036 HEMOGLOBIN GLYCOSYLATED A1C: CPT

## 2023-02-06 PROCEDURE — 93005 ELECTROCARDIOGRAM TRACING: CPT

## 2023-02-06 PROCEDURE — 81003 URINALYSIS AUTO W/O SCOPE: CPT

## 2023-02-06 PROCEDURE — 85610 PROTHROMBIN TIME: CPT

## 2023-02-06 PROCEDURE — 71046 X-RAY EXAM CHEST 2 VIEWS: CPT

## 2023-02-06 PROCEDURE — 85730 THROMBOPLASTIN TIME PARTIAL: CPT

## 2023-02-13 ENCOUNTER — TRANSCRIBE ORDERS (OUTPATIENT)
Facility: HOSPITAL | Age: 53
End: 2023-02-13

## 2023-02-13 ENCOUNTER — HOSPITAL ENCOUNTER (OUTPATIENT)
Facility: HOSPITAL | Age: 53
Discharge: HOME | End: 2023-02-16
Payer: COMMERCIAL

## 2023-02-13 DIAGNOSIS — M17.32 POST-TRAUMATIC OSTEOARTHRITIS OF LEFT KNEE: Primary | ICD-10-CM

## 2023-02-13 DIAGNOSIS — Z01.818 PREOP EXAMINATION: ICD-10-CM

## 2023-02-13 LAB
ABO + RH BLD: NORMAL
BLOOD GROUP ANTIBODIES SERPL: NORMAL
SPECIMEN EXP DATE BLD: NORMAL

## 2023-02-13 PROCEDURE — 36415 COLL VENOUS BLD VENIPUNCTURE: CPT

## 2023-02-13 PROCEDURE — 86850 RBC ANTIBODY SCREEN: CPT

## 2023-02-15 ENCOUNTER — OFFICE VISIT (OUTPATIENT)
Age: 53
End: 2023-02-15

## 2023-02-15 VITALS
WEIGHT: 221 LBS | HEIGHT: 70 IN | TEMPERATURE: 97.7 F | DIASTOLIC BLOOD PRESSURE: 80 MMHG | SYSTOLIC BLOOD PRESSURE: 115 MMHG | BODY MASS INDEX: 31.64 KG/M2 | HEART RATE: 102 BPM | OXYGEN SATURATION: 98 %

## 2023-02-15 DIAGNOSIS — Z01.818 PREOP GENERAL PHYSICAL EXAM: Primary | ICD-10-CM

## 2023-02-15 DIAGNOSIS — M17.12 ARTHRITIS OF LEFT KNEE: ICD-10-CM

## 2023-02-15 RX ORDER — CEFAZOLIN SODIUM 1 G/3ML
1000 INJECTION, POWDER, FOR SOLUTION INTRAMUSCULAR; INTRAVENOUS
OUTPATIENT
Start: 2023-02-21 | End: 2023-02-21

## 2023-02-15 RX ORDER — ACETAMINOPHEN 325 MG/1
650 TABLET ORAL
OUTPATIENT
Start: 2023-02-21 | End: 2023-02-21

## 2023-02-15 RX ORDER — APREPITANT 40 MG/1
40 CAPSULE ORAL
OUTPATIENT
Start: 2023-02-21 | End: 2023-02-21

## 2023-02-15 RX ORDER — GABAPENTIN 100 MG/1
100 CAPSULE ORAL
OUTPATIENT
Start: 2023-02-21 | End: 2023-02-21

## 2023-02-15 RX ORDER — CELECOXIB 100 MG/1
200 CAPSULE ORAL
OUTPATIENT
Start: 2023-02-21 | End: 2023-02-21

## 2023-02-15 NOTE — H&P (VIEW-ONLY)
Patient: Adeel Cam                MRN: [id number]       SSN: [id number]  YOB: 1970        AGE: 48 y.o. SEX: male          PCP: SIOBHAN Abel NP  02/15/23    Chief Complaint   Patient presents with    Knee Pain     Left         HISTORY:  Adeel Cam is a 48 y.o. male returns the office for follow-up regarding his upcoming left knee hardware removal with total joint replacement conversion. He is a resident of a [facility] and has 24-hour roof overhead availability for postop treatment through home health. Discussion was held with Magda Henning today concerning patient's needs postop. Magda Henning indicated that the patient would receive all necessary ability and access to care postop. No results found for: HBA1C, [id number]  Weight Metrics 2/15/2023 1/8/2023 7/21/2022 7/16/2022 5/25/2022   Weight 221 lb 230 lb 233 lb 9.6 oz 233 lb 233 lb 6.4 oz   BMI (Calculated) 31.8 kg/m2 33.1 kg/m2 32.6 kg/m2 33.5 kg/m2 33.6 kg/m2          Problem List Items Addressed This Visit    None  Visit Diagnoses       Preop general physical exam    -  Primary    Arthritis of left knee                PAST MEDICAL HISTORY: History reviewed. No pertinent past medical history. PAST SURGICAL HISTORY:   Past Surgical History:   Procedure Laterality Date    HERNIA REPAIR         ALLERGIES: No Known Allergies     CURRENT MEDICATIONS:  A list of medications prior to the time of admission include:  Prior to Admission medications    Medication Sig Start Date End Date Taking? Authorizing Provider   naproxen (NAPROSYN) 500 MG tablet Take 500 mg by mouth 2 times daily (with meals) 7/16/22   Ar Automatic Reconciliation       FAMILY HISTORY: No family history on file.     SOCIAL HISTORY:   Social History     Socioeconomic History    Marital status: Single     Spouse name: None    Number of children: None    Years of education: None Highest education level: None   Tobacco Use    Smoking status: Never    Smokeless tobacco: Never   Substance and Sexual Activity    Alcohol use: Not Currently    Drug use: Never       ROS:No CP, No SOB, No fever/chills nor night sweats. No headaches, vision abnormalities to include double and or loss of vision. No dizziness. No hearing abnormalities. No Chest Pain nor Shortness of breath. Pt denies h/o spinal surgery, injections, or PT/chiropractor. Patient has attempted self treatment with less than adequate relief on oral and topical analgesic / anti inflammatory medications . Pt denies change in bowel or bladder habits. No saddle paresthesia / anesthesia. Pt denies fever, unplanned weight loss / weight gains, and no skin changes. Musculoskeletal pain per HPI. Pain is exacerbated positionally. PHYSICAL EXAM:    /80   Pulse (!) 102   Temp 97.7 °F (36.5 °C) (Temporal)   Ht 5' 10\" (1.778 m)   Wt 221 lb (100.2 kg)   SpO2 98%   BMI 31.71 kg/m²     Constitutional: Appears well-developed and well-nourished. No distress. Sitting comfortably in the exam room, interacting with conversation with pleasant affect. Gait appears steady and patient exhibits no evidence of ataxia. Patient is able to ambulate with caution. No focal neurological deficit noted. No facial droop, slurred speech, or evidence of altered mentation noted on exam.   Skin: Skin over the head, neck, bilateral limbs, and trunk is warm and dry. No rash or erythema noted. Cranial Nerves II-XII grossly intact  HENT: NC/AT. Normal symmetry, bulk and tone of facial and neck musculature. Trachea midline. No discernible thyromegaly or masses. No involuntary movements. Lymphatic: No preauricular, submandibuar, anterior or posterior cervical lymphadenopathy. Psychiatric: The patient is awake, alert, and oriented to person, place and time. Behavior is normal. Thought content normal.   Cardiovascular: No clubbing, cyanosis.   No edema bilateral lower extremities. Pulmonary: No tripoding nor accessory muscle recruitment. Breathing normally, no distress, no audible wheezing. Distal cap refill intact at 2/2 Toni UE / LE. Neuro intact Toni UE/LE to noxious stimuli        Ortho Specific exam:    Left knee over the midline healed surgical incision measuring 10 cm. Medial knee transverse wound healed measuring 3 cm. Active range of motion with crepitation 95 degrees -5 degrees. Patella tracks midline with crepitation of. IMPRESSION:      ICD-10-CM    1. Preop general physical exam  Z01.818 vancomycin (VANCOCIN) 1,000 mg in sodium chloride 0.9 % 250 mL IVPB     ceFAZolin (ANCEF) injection 1,000 mg      2. Arthritis of left knee  M17.12 vancomycin (VANCOCIN) 1,000 mg in sodium chloride 0.9 % 250 mL IVPB     ceFAZolin (ANCEF) injection 1,000 mg      3. Retained orthopedic hardware left knee  4. Osteoarthritis of the left knee end-stage    PLAN:  Per Dr. Gael Rivera patient to proceed with hardware removal left knee and conversion to a total joint replacement. Orthopaedically, this patient requires admission as predetermined by the attending physicians documented severity of the admitting diagnosis,  and expected need for post surgical and co morbity health management determines length of projected stay. Risk / Benefit: Surgeon will discuss in \"face to face\" encounter on day of surgery. Family History and Surgical History reviewed, discussed in detail, and deemed Non-Contributory in preparation for this surgical encounter. Care plan outlined and precautions discussed. Results were reviewed with the patient. All medications were reviewed with the patient. All of pt's questions and concerns were addressed. Alarm symptoms and return precautions associated with chief complaint and evaluation were reviewed with the patient in detail. The patient demonstrated adequate understanding. The patient expresses willing compliance with the treatment plan. Special note: Medication management discussed in detail all patient's questions answered to their satisfaction. Patient provided a reminder for a \"due or due soon\" health maintenance. I have asked the patient to schedule an appointment with their primary care provider for follow-up on general health maintenance concerns. Today all the patient's questions were answered to their satisfaction. Copies of x-rays reviewed if obtained this visit, and provided to patient. Dictation disclaimer:  Please note that this dictation was completed with [email or web address], the [employer] voice recognition software. Quite often unanticipated grammatical, syntax, homophones, and other interpretive errors are inadvertently transcribed by the computer software. Please disregard these errors. Please excuse any errors that have escaped final proofreading. Arnie YATES, APC, MPAS, PA-C  [facility]

## 2023-02-15 NOTE — H&P
Patient: Esther Petersen                MRN: [id number]       SSN: [id number]  YOB: 1970        AGE: 48 y.o. SEX: male          PCP: SIOBHAN Bishop NP  02/15/23    Chief Complaint   Patient presents with    Knee Pain     Left         HISTORY:  Esther Petersen is a 48 y.o. male returns the office for follow-up regarding his upcoming left knee hardware removal with total joint replacement conversion. He is a resident of a [facility] and has 24-hour roof overhead availability for postop treatment through home health. Discussion was held with Blayne Winkler today concerning patient's needs postop. Blayne Winkler indicated that the patient would receive all necessary ability and access to care postop. No results found for: HBA1C, [id number]  Weight Metrics 2/15/2023 1/8/2023 7/21/2022 7/16/2022 5/25/2022   Weight 221 lb 230 lb 233 lb 9.6 oz 233 lb 233 lb 6.4 oz   BMI (Calculated) 31.8 kg/m2 33.1 kg/m2 32.6 kg/m2 33.5 kg/m2 33.6 kg/m2          Problem List Items Addressed This Visit    None  Visit Diagnoses       Preop general physical exam    -  Primary    Arthritis of left knee                PAST MEDICAL HISTORY: History reviewed. No pertinent past medical history. PAST SURGICAL HISTORY:   Past Surgical History:   Procedure Laterality Date    HERNIA REPAIR         ALLERGIES: No Known Allergies     CURRENT MEDICATIONS:  A list of medications prior to the time of admission include:  Prior to Admission medications    Medication Sig Start Date End Date Taking? Authorizing Provider   naproxen (NAPROSYN) 500 MG tablet Take 500 mg by mouth 2 times daily (with meals) 7/16/22   Ar Automatic Reconciliation       FAMILY HISTORY: No family history on file.     SOCIAL HISTORY:   Social History     Socioeconomic History    Marital status: Single     Spouse name: None    Number of children: None    Years of education: None Highest education level: None   Tobacco Use    Smoking status: Never    Smokeless tobacco: Never   Substance and Sexual Activity    Alcohol use: Not Currently    Drug use: Never       ROS:No CP, No SOB, No fever/chills nor night sweats. No headaches, vision abnormalities to include double and or loss of vision. No dizziness. No hearing abnormalities. No Chest Pain nor Shortness of breath. Pt denies h/o spinal surgery, injections, or PT/chiropractor. Patient has attempted self treatment with less than adequate relief on oral and topical analgesic / anti inflammatory medications . Pt denies change in bowel or bladder habits. No saddle paresthesia / anesthesia. Pt denies fever, unplanned weight loss / weight gains, and no skin changes. Musculoskeletal pain per HPI. Pain is exacerbated positionally. PHYSICAL EXAM:    /80   Pulse (!) 102   Temp 97.7 °F (36.5 °C) (Temporal)   Ht 5' 10\" (1.778 m)   Wt 221 lb (100.2 kg)   SpO2 98%   BMI 31.71 kg/m²     Constitutional: Appears well-developed and well-nourished. No distress. Sitting comfortably in the exam room, interacting with conversation with pleasant affect. Gait appears steady and patient exhibits no evidence of ataxia. Patient is able to ambulate with caution. No focal neurological deficit noted. No facial droop, slurred speech, or evidence of altered mentation noted on exam.   Skin: Skin over the head, neck, bilateral limbs, and trunk is warm and dry. No rash or erythema noted. Cranial Nerves II-XII grossly intact  HENT: NC/AT. Normal symmetry, bulk and tone of facial and neck musculature. Trachea midline. No discernible thyromegaly or masses. No involuntary movements. Lymphatic: No preauricular, submandibuar, anterior or posterior cervical lymphadenopathy. Psychiatric: The patient is awake, alert, and oriented to person, place and time. Behavior is normal. Thought content normal.   Cardiovascular: No clubbing, cyanosis.   No edema bilateral lower extremities. Pulmonary: No tripoding nor accessory muscle recruitment. Breathing normally, no distress, no audible wheezing. Distal cap refill intact at 2/2 Toni UE / LE. Neuro intact Toni UE/LE to noxious stimuli        Ortho Specific exam:    Left knee over the midline healed surgical incision measuring 10 cm. Medial knee transverse wound healed measuring 3 cm. Active range of motion with crepitation 95 degrees -5 degrees. Patella tracks midline with crepitation of. IMPRESSION:      ICD-10-CM    1. Preop general physical exam  Z01.818 vancomycin (VANCOCIN) 1,000 mg in sodium chloride 0.9 % 250 mL IVPB     ceFAZolin (ANCEF) injection 1,000 mg      2. Arthritis of left knee  M17.12 vancomycin (VANCOCIN) 1,000 mg in sodium chloride 0.9 % 250 mL IVPB     ceFAZolin (ANCEF) injection 1,000 mg      3. Retained orthopedic hardware left knee  4. Osteoarthritis of the left knee end-stage    PLAN:  Per Dr. Eveline Ahumada patient to proceed with hardware removal left knee and conversion to a total joint replacement. Orthopaedically, this patient requires admission as predetermined by the attending physicians documented severity of the admitting diagnosis,  and expected need for post surgical and co morbity health management determines length of projected stay. Risk / Benefit: Surgeon will discuss in \"face to face\" encounter on day of surgery. Family History and Surgical History reviewed, discussed in detail, and deemed Non-Contributory in preparation for this surgical encounter. Care plan outlined and precautions discussed. Results were reviewed with the patient. All medications were reviewed with the patient. All of pt's questions and concerns were addressed. Alarm symptoms and return precautions associated with chief complaint and evaluation were reviewed with the patient in detail. The patient demonstrated adequate understanding. The patient expresses willing compliance with the treatment plan. Special note: Medication management discussed in detail all patient's questions answered to their satisfaction. Patient provided a reminder for a \"due or due soon\" health maintenance. I have asked the patient to schedule an appointment with their primary care provider for follow-up on general health maintenance concerns. Today all the patient's questions were answered to their satisfaction. Copies of x-rays reviewed if obtained this visit, and provided to patient. Dictation disclaimer:  Please note that this dictation was completed with [employer], the [employer] voice recognition software. Quite often unanticipated grammatical, syntax, homophones, and other interpretive errors are inadvertently transcribed by the computer software. Please disregard these errors. Please excuse any errors that have escaped final proofreading. Cecilio Lefort Brillhart APA, APC, MPAS, PA-C  [facility]

## 2023-02-20 ENCOUNTER — ANESTHESIA EVENT (OUTPATIENT)
Facility: HOSPITAL | Age: 53
End: 2023-02-20
Payer: COMMERCIAL

## 2023-02-20 NOTE — PROGRESS NOTES
PRE-SURGICAL INSTRUCTIONS        Patient's Name:  Pippa Wilkerson      Today's Date:  2/20/2023            Covid- RAPID DAY OF SURGERY    Surgery Date: 02/21/2023                 Do NOT eat or drink anything, including candy, gum, or ice chips after midnight on 02/21/2023, unless you have specific instructions from your surgeon or anesthesia provider to do so. You may brush your teeth before coming to the hospital.  No smoking 24 hours prior to the day of surgery. No alcohol 24 hours prior to the day of surgery. No recreational drugs for one week prior to the day of surgery. Leave all valuables, including money/purse, at home. Remove all jewelry, nail polish, acrylic nails, and makeup (including mascara); no lotions powders, deodorant, or perfume/cologne/after shave on the skin. Follow instruction for Hibiclens washes and CHG wipes from surgeon's office. Glasses/contact lenses and dentures may be worn to the hospital.  They will be removed prior to surgery. Call your doctor if symptoms of a cold or illness develop within 24-48 hours prior to your surgery. 11.  If you are having an outpatient procedure, please make arrangements for a responsible ADULT TO [address] [place] [address] and stay with you for 24 hours after your surgery. 12. ONE VISITOR in the hospital at this time for outpatient procedures. Exceptions may be made for surgical admissions, per nursing unit guidelines      Special Instructions:      Bring list of CURRENT medications. Bring any pertinent legal medical records. Take these medications the morning of surgery with a sip of water as instructed by office. On the day of surgery, come in the main entrance of [facility]. Let the  at the desk know you are there for surgery. A staff member will come escort you to the surgical area on the second floor.     If you have any questions or concerns, please do not hesitate to call:     (Prior to the day of surgery) [facility] department:  [phone]   (Day of surgery) Pre-Op department:  [phone]    These surgical instructions were reviewed with Jazlyn Marcos during the [facility] phone call.

## 2023-02-21 ENCOUNTER — APPOINTMENT (OUTPATIENT)
Facility: HOSPITAL | Age: 53
End: 2023-02-21
Attending: SPECIALIST
Payer: COMMERCIAL

## 2023-02-21 ENCOUNTER — ANESTHESIA (OUTPATIENT)
Facility: HOSPITAL | Age: 53
End: 2023-02-21
Payer: COMMERCIAL

## 2023-02-21 ENCOUNTER — HOSPITAL ENCOUNTER (OUTPATIENT)
Facility: HOSPITAL | Age: 53
Setting detail: OBSERVATION
Discharge: HOME | End: 2023-02-22
Attending: SPECIALIST | Admitting: SPECIALIST
Payer: COMMERCIAL

## 2023-02-21 DIAGNOSIS — Z01.818 PREOP GENERAL PHYSICAL EXAM: ICD-10-CM

## 2023-02-21 DIAGNOSIS — Z96.652 S/P TOTAL KNEE ARTHROPLASTY, LEFT: Primary | ICD-10-CM

## 2023-02-21 DIAGNOSIS — M17.12 ARTHRITIS OF LEFT KNEE: ICD-10-CM

## 2023-02-21 PROBLEM — M17.32 POST-TRAUMATIC OSTEOARTHRITIS OF LEFT KNEE: Status: ACTIVE | Noted: 2023-02-21

## 2023-02-21 LAB
SARS-COV-2 RDRP RESP QL NAA+PROBE: NOT DETECTED
SOURCE: NORMAL

## 2023-02-21 PROCEDURE — 7100000000 HC PACU RECOVERY - FIRST 15 MIN: Performed by: SPECIALIST

## 2023-02-21 PROCEDURE — 2580000003 HC RX 258: Performed by: PHYSICIAN ASSISTANT

## 2023-02-21 PROCEDURE — 6370000000 HC RX 637 (ALT 250 FOR IP): Performed by: PHYSICIAN ASSISTANT

## 2023-02-21 PROCEDURE — 6360000002 HC RX W HCPCS: Performed by: PHYSICIAN ASSISTANT

## 2023-02-21 PROCEDURE — 2580000003 HC RX 258: Performed by: SPECIALIST

## 2023-02-21 PROCEDURE — 3700000000 HC ANESTHESIA ATTENDED CARE: Performed by: SPECIALIST

## 2023-02-21 PROCEDURE — 2580000003 HC RX 258: Performed by: NURSE ANESTHETIST, CERTIFIED REGISTERED

## 2023-02-21 PROCEDURE — 2709999900 HC NON-CHARGEABLE SUPPLY: Performed by: SPECIALIST

## 2023-02-21 PROCEDURE — 27447 TOTAL KNEE ARTHROPLASTY: CPT | Performed by: SPECIALIST

## 2023-02-21 PROCEDURE — 2700000000 HC OXYGEN THERAPY PER DAY

## 2023-02-21 PROCEDURE — 3700000001 HC ADD 15 MINUTES (ANESTHESIA): Performed by: SPECIALIST

## 2023-02-21 PROCEDURE — 6360000002 HC RX W HCPCS: Performed by: SPECIALIST

## 2023-02-21 PROCEDURE — G0378 HOSPITAL OBSERVATION PER HR: HCPCS

## 2023-02-21 PROCEDURE — 6360000002 HC RX W HCPCS: Performed by: NURSE ANESTHETIST, CERTIFIED REGISTERED

## 2023-02-21 PROCEDURE — 2500000003 HC RX 250 WO HCPCS: Performed by: NURSE ANESTHETIST, CERTIFIED REGISTERED

## 2023-02-21 PROCEDURE — 3600000003 HC SURGERY LEVEL 3 BASE: Performed by: SPECIALIST

## 2023-02-21 PROCEDURE — 87635 SARS-COV-2 COVID-19 AMP PRB: CPT

## 2023-02-21 PROCEDURE — 3600000013 HC SURGERY LEVEL 3 ADDTL 15MIN: Performed by: SPECIALIST

## 2023-02-21 PROCEDURE — 2500000003 HC RX 250 WO HCPCS: Performed by: SPECIALIST

## 2023-02-21 PROCEDURE — 6370000000 HC RX 637 (ALT 250 FOR IP): Performed by: SPECIALIST

## 2023-02-21 PROCEDURE — 64447 NJX AA&/STRD FEMORAL NRV IMG: CPT | Performed by: STUDENT IN AN ORGANIZED HEALTH CARE EDUCATION/TRAINING PROGRAM

## 2023-02-21 PROCEDURE — 7100000001 HC PACU RECOVERY - ADDTL 15 MIN: Performed by: SPECIALIST

## 2023-02-21 PROCEDURE — 64450 NJX AA&/STRD OTHER PN/BRANCH: CPT | Performed by: STUDENT IN AN ORGANIZED HEALTH CARE EDUCATION/TRAINING PROGRAM

## 2023-02-21 PROCEDURE — C1729 CATH, DRAINAGE: HCPCS | Performed by: SPECIALIST

## 2023-02-21 PROCEDURE — C1776 JOINT DEVICE (IMPLANTABLE): HCPCS | Performed by: SPECIALIST

## 2023-02-21 PROCEDURE — C9290 INJ, BUPIVACAINE LIPOSOME: HCPCS | Performed by: SPECIALIST

## 2023-02-21 PROCEDURE — 6360000002 HC RX W HCPCS: Performed by: STUDENT IN AN ORGANIZED HEALTH CARE EDUCATION/TRAINING PROGRAM

## 2023-02-21 PROCEDURE — 6370000000 HC RX 637 (ALT 250 FOR IP): Performed by: NURSE ANESTHETIST, CERTIFIED REGISTERED

## 2023-02-21 DEVICE — METACEM-X HV: Type: IMPLANTABLE DEVICE | Site: KNEE | Status: FUNCTIONAL

## 2023-02-21 DEVICE — RESURFACING PATELLA SIZE 4
Type: IMPLANTABLE DEVICE | Site: PATELLA | Status: FUNCTIONAL
Brand: GMK PRIMARY TOTAL KNEE SYSTEM

## 2023-02-21 DEVICE — TIBIAL INSERT PS FIXED 14MM, SIZE 6
Type: IMPLANTABLE DEVICE | Site: KNEE | Status: FUNCTIONAL
Brand: GMK PRIMARY TOTAL KNEE SYSTEM

## 2023-02-21 DEVICE — FEMUR PS CEMENTED LEFT, SIZE 6
Type: IMPLANTABLE DEVICE | Site: KNEE | Status: FUNCTIONAL
Brand: GMK PRIMARY TOTAL KNEE SYSTEM

## 2023-02-21 DEVICE — FIXED TIBIAL TRAY CEMENTED LEFT, SIZE 6
Type: IMPLANTABLE DEVICE | Site: KNEE | Status: FUNCTIONAL
Brand: GMK PRIMARY TOTAL KNEE SYSTEM

## 2023-02-21 DEVICE — COMPONENT KNEE CMNTLS K1 PREMIER: Type: IMPLANTABLE DEVICE | Site: KNEE | Status: FUNCTIONAL

## 2023-02-21 RX ORDER — FENTANYL CITRATE 50 UG/ML
INJECTION, SOLUTION INTRAMUSCULAR; INTRAVENOUS PRN
Status: DISCONTINUED | OUTPATIENT
Start: 2023-02-21 | End: 2023-02-21 | Stop reason: SDUPTHER

## 2023-02-21 RX ORDER — SODIUM CHLORIDE 0.9 % (FLUSH) 0.9 %
5-40 SYRINGE (ML) INJECTION PRN
Status: DISCONTINUED | OUTPATIENT
Start: 2023-02-21 | End: 2023-02-22 | Stop reason: HOSPADM

## 2023-02-21 RX ORDER — HYDROXYZINE HYDROCHLORIDE 10 MG/1
10 TABLET, FILM COATED ORAL EVERY 8 HOURS PRN
Status: DISCONTINUED | OUTPATIENT
Start: 2023-02-21 | End: 2023-02-22 | Stop reason: HOSPADM

## 2023-02-21 RX ORDER — FENTANYL CITRATE 50 UG/ML
25 INJECTION, SOLUTION INTRAMUSCULAR; INTRAVENOUS EVERY 5 MIN PRN
Status: COMPLETED | OUTPATIENT
Start: 2023-02-21 | End: 2023-02-21

## 2023-02-21 RX ORDER — ASPIRIN 81 MG/1
81 TABLET ORAL 2 TIMES DAILY
Status: DISCONTINUED | OUTPATIENT
Start: 2023-02-21 | End: 2023-02-22 | Stop reason: HOSPADM

## 2023-02-21 RX ORDER — ONDANSETRON 2 MG/ML
INJECTION INTRAMUSCULAR; INTRAVENOUS PRN
Status: DISCONTINUED | OUTPATIENT
Start: 2023-02-21 | End: 2023-02-21 | Stop reason: SDUPTHER

## 2023-02-21 RX ORDER — FAMOTIDINE 20 MG/1
20 TABLET, FILM COATED ORAL ONCE
Status: COMPLETED | OUTPATIENT
Start: 2023-02-21 | End: 2023-02-21

## 2023-02-21 RX ORDER — HYDROMORPHONE HYDROCHLORIDE 1 MG/ML
0.5 INJECTION, SOLUTION INTRAMUSCULAR; INTRAVENOUS; SUBCUTANEOUS EVERY 10 MIN PRN
Status: COMPLETED | OUTPATIENT
Start: 2023-02-21 | End: 2023-02-21

## 2023-02-21 RX ORDER — METOCLOPRAMIDE HYDROCHLORIDE 5 MG/ML
10 INJECTION INTRAMUSCULAR; INTRAVENOUS
Status: DISCONTINUED | OUTPATIENT
Start: 2023-02-21 | End: 2023-02-21 | Stop reason: HOSPADM

## 2023-02-21 RX ORDER — MIDAZOLAM HYDROCHLORIDE 2 MG/2ML
2 INJECTION, SOLUTION INTRAMUSCULAR; INTRAVENOUS ONCE
Status: COMPLETED | OUTPATIENT
Start: 2023-02-21 | End: 2023-02-21

## 2023-02-21 RX ORDER — SODIUM CHLORIDE 9 MG/ML
INJECTION, SOLUTION INTRAVENOUS PRN
Status: DISCONTINUED | OUTPATIENT
Start: 2023-02-21 | End: 2023-02-22 | Stop reason: HOSPADM

## 2023-02-21 RX ORDER — CELECOXIB 100 MG/1
200 CAPSULE ORAL
Status: COMPLETED | OUTPATIENT
Start: 2023-02-21 | End: 2023-02-21

## 2023-02-21 RX ORDER — PROPOFOL 10 MG/ML
INJECTION, EMULSION INTRAVENOUS PRN
Status: DISCONTINUED | OUTPATIENT
Start: 2023-02-21 | End: 2023-02-21 | Stop reason: SDUPTHER

## 2023-02-21 RX ORDER — HYDROMORPHONE HYDROCHLORIDE 1 MG/ML
0.5 INJECTION, SOLUTION INTRAMUSCULAR; INTRAVENOUS; SUBCUTANEOUS EVERY 5 MIN PRN
Status: COMPLETED | OUTPATIENT
Start: 2023-02-21 | End: 2023-02-21

## 2023-02-21 RX ORDER — SODIUM CHLORIDE, SODIUM LACTATE, POTASSIUM CHLORIDE, CALCIUM CHLORIDE 600; 310; 30; 20 MG/100ML; MG/100ML; MG/100ML; MG/100ML
INJECTION, SOLUTION INTRAVENOUS CONTINUOUS
Status: DISCONTINUED | OUTPATIENT
Start: 2023-02-21 | End: 2023-02-21 | Stop reason: HOSPADM

## 2023-02-21 RX ORDER — APREPITANT 40 MG/1
40 CAPSULE ORAL
Status: COMPLETED | OUTPATIENT
Start: 2023-02-21 | End: 2023-02-21

## 2023-02-21 RX ORDER — CEPHALEXIN 500 MG/1
CAPSULE ORAL
Status: ON HOLD | COMMUNITY
End: 2023-02-22 | Stop reason: HOSPADM

## 2023-02-21 RX ORDER — NAPROXEN 500 MG/1
TABLET ORAL
Status: ON HOLD | COMMUNITY
End: 2023-02-22 | Stop reason: HOSPADM

## 2023-02-21 RX ORDER — ROPIVACAINE HYDROCHLORIDE 2 MG/ML
30 INJECTION, SOLUTION EPIDURAL; INFILTRATION; PERINEURAL ONCE
Status: COMPLETED | OUTPATIENT
Start: 2023-02-21 | End: 2023-02-21

## 2023-02-21 RX ORDER — KETOROLAC TROMETHAMINE 15 MG/ML
15 INJECTION, SOLUTION INTRAMUSCULAR; INTRAVENOUS EVERY 6 HOURS
Status: DISCONTINUED | OUTPATIENT
Start: 2023-02-21 | End: 2023-02-22 | Stop reason: HOSPADM

## 2023-02-21 RX ORDER — OXYCODONE HYDROCHLORIDE 5 MG/1
5 TABLET ORAL EVERY 4 HOURS PRN
Status: DISCONTINUED | OUTPATIENT
Start: 2023-02-21 | End: 2023-02-22 | Stop reason: HOSPADM

## 2023-02-21 RX ORDER — ONDANSETRON 2 MG/ML
4 INJECTION INTRAMUSCULAR; INTRAVENOUS EVERY 4 HOURS PRN
Status: DISCONTINUED | OUTPATIENT
Start: 2023-02-21 | End: 2023-02-22 | Stop reason: HOSPADM

## 2023-02-21 RX ORDER — TRAMADOL HYDROCHLORIDE 50 MG/1
50 TABLET ORAL EVERY 6 HOURS PRN
Status: DISCONTINUED | OUTPATIENT
Start: 2023-02-21 | End: 2023-02-22 | Stop reason: HOSPADM

## 2023-02-21 RX ORDER — MEPERIDINE HYDROCHLORIDE 25 MG/ML
12.5 INJECTION INTRAMUSCULAR; INTRAVENOUS; SUBCUTANEOUS EVERY 5 MIN PRN
Status: DISCONTINUED | OUTPATIENT
Start: 2023-02-21 | End: 2023-02-21 | Stop reason: HOSPADM

## 2023-02-21 RX ORDER — LIDOCAINE HYDROCHLORIDE 20 MG/ML
INJECTION, SOLUTION EPIDURAL; INFILTRATION; INTRACAUDAL; PERINEURAL PRN
Status: DISCONTINUED | OUTPATIENT
Start: 2023-02-21 | End: 2023-02-21 | Stop reason: SDUPTHER

## 2023-02-21 RX ORDER — COVID-19 ANTIGEN TEST
KIT MISCELLANEOUS
Status: ON HOLD | COMMUNITY
End: 2023-02-22 | Stop reason: HOSPADM

## 2023-02-21 RX ORDER — FENTANYL CITRATE 50 UG/ML
100 INJECTION, SOLUTION INTRAMUSCULAR; INTRAVENOUS ONCE
Status: COMPLETED | OUTPATIENT
Start: 2023-02-21 | End: 2023-02-21

## 2023-02-21 RX ORDER — LIDOCAINE HYDROCHLORIDE 10 MG/ML
1 INJECTION, SOLUTION EPIDURAL; INFILTRATION; INTRACAUDAL; PERINEURAL
Status: COMPLETED | OUTPATIENT
Start: 2023-02-21 | End: 2023-02-21

## 2023-02-21 RX ORDER — DEXTROSE, SODIUM CHLORIDE, SODIUM LACTATE, POTASSIUM CHLORIDE, AND CALCIUM CHLORIDE 5; .6; .31; .03; .02 G/100ML; G/100ML; G/100ML; G/100ML; G/100ML
INJECTION, SOLUTION INTRAVENOUS CONTINUOUS
Status: DISCONTINUED | OUTPATIENT
Start: 2023-02-21 | End: 2023-02-22 | Stop reason: HOSPADM

## 2023-02-21 RX ORDER — SODIUM CHLORIDE, SODIUM LACTATE, POTASSIUM CHLORIDE, CALCIUM CHLORIDE 600; 310; 30; 20 MG/100ML; MG/100ML; MG/100ML; MG/100ML
INJECTION, SOLUTION INTRAVENOUS CONTINUOUS
Status: DISCONTINUED | OUTPATIENT
Start: 2023-02-21 | End: 2023-02-22 | Stop reason: HOSPADM

## 2023-02-21 RX ORDER — DEXMEDETOMIDINE HYDROCHLORIDE 100 UG/ML
INJECTION, SOLUTION INTRAVENOUS PRN
Status: DISCONTINUED | OUTPATIENT
Start: 2023-02-21 | End: 2023-02-21 | Stop reason: SDUPTHER

## 2023-02-21 RX ORDER — DEXAMETHASONE SODIUM PHOSPHATE 4 MG/ML
INJECTION, SOLUTION INTRA-ARTICULAR; INTRALESIONAL; INTRAMUSCULAR; INTRAVENOUS; SOFT TISSUE PRN
Status: DISCONTINUED | OUTPATIENT
Start: 2023-02-21 | End: 2023-02-21 | Stop reason: SDUPTHER

## 2023-02-21 RX ORDER — SODIUM CHLORIDE 0.9 % (FLUSH) 0.9 %
5-40 SYRINGE (ML) INJECTION EVERY 12 HOURS SCHEDULED
Status: DISCONTINUED | OUTPATIENT
Start: 2023-02-21 | End: 2023-02-22 | Stop reason: HOSPADM

## 2023-02-21 RX ORDER — PROCHLORPERAZINE EDISYLATE 5 MG/ML
5 INJECTION INTRAMUSCULAR; INTRAVENOUS
Status: DISCONTINUED | OUTPATIENT
Start: 2023-02-21 | End: 2023-02-21 | Stop reason: HOSPADM

## 2023-02-21 RX ORDER — CEFAZOLIN SODIUM 1 G/3ML
1000 INJECTION, POWDER, FOR SOLUTION INTRAMUSCULAR; INTRAVENOUS
Status: DISCONTINUED | OUTPATIENT
Start: 2023-02-21 | End: 2023-02-21

## 2023-02-21 RX ORDER — ACETAMINOPHEN 500 MG
1000 TABLET ORAL EVERY 8 HOURS SCHEDULED
Status: DISCONTINUED | OUTPATIENT
Start: 2023-02-21 | End: 2023-02-22 | Stop reason: HOSPADM

## 2023-02-21 RX ORDER — GABAPENTIN 100 MG/1
100 CAPSULE ORAL
Status: COMPLETED | OUTPATIENT
Start: 2023-02-21 | End: 2023-02-21

## 2023-02-21 RX ORDER — SODIUM CHLORIDE 0.9 % (FLUSH) 0.9 %
5-40 SYRINGE (ML) INJECTION PRN
Status: DISCONTINUED | OUTPATIENT
Start: 2023-02-21 | End: 2023-02-21 | Stop reason: HOSPADM

## 2023-02-21 RX ORDER — POLYETHYLENE GLYCOL 3350 17 G/17G
17 POWDER, FOR SOLUTION ORAL DAILY
Status: DISCONTINUED | OUTPATIENT
Start: 2023-02-21 | End: 2023-02-22 | Stop reason: HOSPADM

## 2023-02-21 RX ORDER — ACETAMINOPHEN 325 MG/1
650 TABLET ORAL
Status: COMPLETED | OUTPATIENT
Start: 2023-02-21 | End: 2023-02-21

## 2023-02-21 RX ADMIN — SODIUM CHLORIDE, PRESERVATIVE FREE 10 ML: 5 INJECTION INTRAVENOUS at 22:16

## 2023-02-21 RX ADMIN — ASPIRIN 81 MG: 81 TABLET, COATED ORAL at 22:17

## 2023-02-21 RX ADMIN — FENTANYL CITRATE 25 MCG: 50 INJECTION, SOLUTION INTRAMUSCULAR; INTRAVENOUS at 14:11

## 2023-02-21 RX ADMIN — KETOROLAC TROMETHAMINE 15 MG: 15 INJECTION, SOLUTION INTRAMUSCULAR; INTRAVENOUS at 22:15

## 2023-02-21 RX ADMIN — HYDROMORPHONE HYDROCHLORIDE 0.5 MG: 1 INJECTION, SOLUTION INTRAMUSCULAR; INTRAVENOUS; SUBCUTANEOUS at 16:24

## 2023-02-21 RX ADMIN — VANCOMYCIN HYDROCHLORIDE 1000 MG: 1 INJECTION, POWDER, LYOPHILIZED, FOR SOLUTION INTRAVENOUS at 09:37

## 2023-02-21 RX ADMIN — CELECOXIB 200 MG: 100 CAPSULE ORAL at 09:22

## 2023-02-21 RX ADMIN — ONDANSETRON 4 MG: 2 INJECTION INTRAMUSCULAR; INTRAVENOUS at 11:21

## 2023-02-21 RX ADMIN — DEXMEDETOMIDINE HYDROCHLORIDE 4 MCG: 100 INJECTION, SOLUTION INTRAVENOUS at 11:43

## 2023-02-21 RX ADMIN — DEXMEDETOMIDINE HYDROCHLORIDE 4 MCG: 100 INJECTION, SOLUTION INTRAVENOUS at 12:08

## 2023-02-21 RX ADMIN — TRANEXAMIC ACID 1000 MG: 100 INJECTION, SOLUTION INTRAVENOUS at 11:08

## 2023-02-21 RX ADMIN — ROPIVACAINE HYDROCHLORIDE 30 ML: 2 INJECTION EPIDURAL; INFILTRATION; PERINEURAL at 09:58

## 2023-02-21 RX ADMIN — HYDROMORPHONE HYDROCHLORIDE 0.5 MG: 1 INJECTION, SOLUTION INTRAMUSCULAR; INTRAVENOUS; SUBCUTANEOUS at 14:45

## 2023-02-21 RX ADMIN — MIDAZOLAM 2 MG: 1 INJECTION INTRAMUSCULAR; INTRAVENOUS at 09:56

## 2023-02-21 RX ADMIN — KETOROLAC TROMETHAMINE 15 MG: 15 INJECTION, SOLUTION INTRAMUSCULAR; INTRAVENOUS at 16:37

## 2023-02-21 RX ADMIN — SODIUM CHLORIDE, SODIUM LACTATE, POTASSIUM CHLORIDE, AND CALCIUM CHLORIDE: 600; 310; 30; 20 INJECTION, SOLUTION INTRAVENOUS at 09:28

## 2023-02-21 RX ADMIN — WATER 2000 MG: 1 INJECTION, SOLUTION INTRAMUSCULAR; INTRAVENOUS; SUBCUTANEOUS at 11:10

## 2023-02-21 RX ADMIN — CEFAZOLIN 2000 MG: 1 INJECTION, POWDER, FOR SOLUTION INTRAMUSCULAR; INTRAVENOUS at 18:49

## 2023-02-21 RX ADMIN — SODIUM CHLORIDE, SODIUM LACTATE, POTASSIUM CHLORIDE, AND CALCIUM CHLORIDE: 600; 310; 30; 20 INJECTION, SOLUTION INTRAVENOUS at 11:39

## 2023-02-21 RX ADMIN — DEXAMETHASONE SODIUM PHOSPHATE 8 MG: 4 INJECTION, SOLUTION INTRAMUSCULAR; INTRAVENOUS at 11:18

## 2023-02-21 RX ADMIN — BISACODYL 5 MG: 5 TABLET, COATED ORAL at 18:49

## 2023-02-21 RX ADMIN — APREPITANT 40 MG: 40 CAPSULE ORAL at 09:21

## 2023-02-21 RX ADMIN — HYDROMORPHONE HYDROCHLORIDE 0.5 MG: 1 INJECTION, SOLUTION INTRAMUSCULAR; INTRAVENOUS; SUBCUTANEOUS at 15:55

## 2023-02-21 RX ADMIN — LIDOCAINE HYDROCHLORIDE 2 ML: 10 INJECTION, SOLUTION EPIDURAL; INFILTRATION; INTRACAUDAL; PERINEURAL at 09:57

## 2023-02-21 RX ADMIN — DEXMEDETOMIDINE HYDROCHLORIDE 4 MCG: 100 INJECTION, SOLUTION INTRAVENOUS at 11:35

## 2023-02-21 RX ADMIN — HYDROMORPHONE HYDROCHLORIDE 0.5 MG: 1 INJECTION, SOLUTION INTRAMUSCULAR; INTRAVENOUS; SUBCUTANEOUS at 14:35

## 2023-02-21 RX ADMIN — DEXMEDETOMIDINE HYDROCHLORIDE 4 MCG: 100 INJECTION, SOLUTION INTRAVENOUS at 11:27

## 2023-02-21 RX ADMIN — PROPOFOL 200 MG: 10 INJECTION, EMULSION INTRAVENOUS at 11:02

## 2023-02-21 RX ADMIN — ACETAMINOPHEN 650 MG: 325 TABLET, FILM COATED ORAL at 09:21

## 2023-02-21 RX ADMIN — GABAPENTIN 100 MG: 100 CAPSULE ORAL at 10:15

## 2023-02-21 RX ADMIN — FENTANYL CITRATE 25 MCG: 50 INJECTION, SOLUTION INTRAMUSCULAR; INTRAVENOUS at 11:31

## 2023-02-21 RX ADMIN — FENTANYL CITRATE 100 MCG: 50 INJECTION, SOLUTION INTRAMUSCULAR; INTRAVENOUS at 09:56

## 2023-02-21 RX ADMIN — LIDOCAINE HYDROCHLORIDE 60 MG: 20 INJECTION, SOLUTION EPIDURAL; INFILTRATION; INTRACAUDAL; PERINEURAL at 11:02

## 2023-02-21 RX ADMIN — ACETAMINOPHEN 1000 MG: 500 TABLET ORAL at 22:15

## 2023-02-21 RX ADMIN — TRAMADOL HYDROCHLORIDE 50 MG: 50 TABLET, COATED ORAL at 18:49

## 2023-02-21 RX ADMIN — FENTANYL CITRATE 50 MCG: 50 INJECTION, SOLUTION INTRAMUSCULAR; INTRAVENOUS at 11:00

## 2023-02-21 RX ADMIN — FAMOTIDINE 20 MG: 20 TABLET ORAL at 09:22

## 2023-02-21 RX ADMIN — POLYETHYLENE GLYCOL 3350 17 G: 17 POWDER, FOR SOLUTION ORAL at 18:49

## 2023-02-21 RX ADMIN — TRANEXAMIC ACID 1000 MG: 100 INJECTION, SOLUTION INTRAVENOUS at 13:14

## 2023-02-21 RX ADMIN — FENTANYL CITRATE 25 MCG: 50 INJECTION, SOLUTION INTRAMUSCULAR; INTRAVENOUS at 11:37

## 2023-02-21 RX ADMIN — FENTANYL CITRATE 25 MCG: 50 INJECTION, SOLUTION INTRAMUSCULAR; INTRAVENOUS at 14:25

## 2023-02-21 ASSESSMENT — PAIN DESCRIPTION - ORIENTATION
ORIENTATION: LEFT
ORIENTATION: LEFT;ANTERIOR

## 2023-02-21 ASSESSMENT — PAIN DESCRIPTION - LOCATION
LOCATION: KNEE

## 2023-02-21 ASSESSMENT — PAIN - FUNCTIONAL ASSESSMENT
PAIN_FUNCTIONAL_ASSESSMENT: ACTIVITIES ARE NOT PREVENTED
PAIN_FUNCTIONAL_ASSESSMENT: 0-10
PAIN_FUNCTIONAL_ASSESSMENT: ACTIVITIES ARE NOT PREVENTED
PAIN_FUNCTIONAL_ASSESSMENT: ACTIVITIES ARE NOT PREVENTED

## 2023-02-21 ASSESSMENT — PAIN DESCRIPTION - DESCRIPTORS
DESCRIPTORS: PRESSURE;SHARP
DESCRIPTORS: SHARP;PRESSURE;ACHING
DESCRIPTORS: PRESSURE;SHARP
DESCRIPTORS: ACHING
DESCRIPTORS: ACHING;TIGHTNESS
DESCRIPTORS: ACHING;SHARP;STABBING

## 2023-02-21 ASSESSMENT — PAIN SCALES - GENERAL
PAINLEVEL_OUTOF10: 6
PAINLEVEL_OUTOF10: 7
PAINLEVEL_OUTOF10: 9

## 2023-02-21 NOTE — ANESTHESIA PROCEDURE NOTES
Peripheral Block    Patient location during procedure: pre-op  Reason for block: post-op pain management and at surgeon's request  Start time: 2/21/2023 10:00 AM  End time: 2/21/2023 10:00 AM  Staffing  Performed: anesthesiologist   Anesthesiologist: Raf Harrison MD  Preanesthetic Checklist  Completed: patient identified, IV checked, site marked, risks and benefits discussed, surgical/procedural consents, equipment checked, pre-op evaluation, timeout performed, anesthesia consent given, oxygen available, monitors applied/VS acknowledged, fire risk safety assessment completed and verbalized and blood product R/B/A discussed and consented  Peripheral Block   Patient position: sitting  Prep: ChloraPrep  Provider prep: mask and sterile gloves  Patient monitoring: cardiac monitor, continuous pulse ox, continuous capnometry, frequent blood pressure checks, IV access, oxygen and responsive to questions  Block type: Saphenous  Laterality: left  Injection technique: single-shot  Guidance: ultrasound guided  Local infiltration: ropivacaine  Infiltration strength: 0.5 %  Local infiltration: ropivacaine  Dose: 30 mL    Needle   Needle type: insulated echogenic nerve stimulator needle   Needle gauge: 21 G  Needle localization: ultrasound guidance  Needle length: 10 cm  Assessment   Injection assessment: negative aspiration for heme, no paresthesia on injection, local visualized surrounding nerve on ultrasound and no intravascular symptoms  Paresthesia pain: none  Slow fractionated injection: yes  Hemodynamics: stable  Real-time US image taken/store: yes  Outcomes: uncomplicated and patient tolerated procedure well

## 2023-02-21 NOTE — BRIEF OP NOTE
Brief Postoperative Note      Patient: Andre Mckenzie  YOB: 1970  MRN: [id number]    Date of Procedure: 2/21/2023    Pre-Op Diagnosis: M17.32, T84.093A LEFT KNEE POST TRAUMATIC ARTHRITIS; S/P    Post-Op Diagnosis: Same       Procedure(s):  LEFT KNEE RETAINED HARDWARE REMOVAL/LEFT TOTAL KNEE ARTHROPLASTY/MEDACTA/2 SA'S/ADDUCTOR CANAL BLOCK; 23HR    Surgeon(s):  Rosa Becker MD    Assistant:  Surgical Assistant: Clyde Quintanilla    Anesthesia: General    Estimated Blood Loss (mL): Minimal    Complications: None    Specimens:   * No specimens in log *    Implants:  Implant Name Type Inv.  Item Serial No.  Lot No. LRB No. Used Action   METACEM-X HV - [id number]  METACEM-X HV  MEDACTA [facility] [id number] Left 1 Implanted   COMPONENT PAT SZ 4 RESURF AUG GMK - [id number]  COMPONENT PAT SZ 4 RESURF AUG GMK  MEDACT[employer] [facility] [id number] Left 1 Implanted   COMPONENT FEM SZ 6 LT POST STBL GEOVANY GMK - [id number]  COMPONENT FEM SZ 6 LT POST STBL GEOVANY GMK  MEDACTA [employer] [id number] Left 1 Implanted   COMPONENT TIB SZ 6 LT FIX GEOVANY GMK - [id number]  COMPONENT TIB SZ 6 LT FIX GEOVANY GMK  MEDACTA [facility] [id number] Left 1 Implanted   extension stem    MEDACTA [facility] [id number] Left 1 Implanted   INSERT TIB SZ 6 [id number] POST STBL FIX GMK - [id number]  INSERT TIB SZ 6 [id number] POST STBL FIX GMK  MEDACT[employer] [facility] [id number] Left 1 Implanted         Drains: * No LDAs found *    Findings: above    Electronically signed by Rosa Becker MD on 2/21/2023 at 1:30 PM

## 2023-02-21 NOTE — PERIOP NOTE
TRANSFER - OUT REPORT:    Verbal report given to [facility]  on Alicia Michael  being transferred to 2 Surgical for routine post-op       Report consisted of patient's Situation, Background, Assessment and   Recommendations(SBAR). Information from the following report(s) Nurse Handoff Report, Surgery Report, MAR, and Cardiac Rhythm sinus miguel angel/sinus rhythm  was reviewed with the receiving nurse. [place] Assessment: No data recorded  Lines:   Peripheral IV 02/21/23 Right;Dorsal Hand (Active)        Opportunity for questions and clarification was provided.       Patient transported with:  [employer]

## 2023-02-21 NOTE — PERIOP NOTE
3 screws and one wire explanted from left knee (all intact). Will be discarded per surgeon. One screw remains in place.

## 2023-02-21 NOTE — ANESTHESIA PRE PROCEDURE
Department of Anesthesiology  Preprocedure Note       Name:  Becky Ingram   Age:  48 y.o.  :  1970                                          MRN:  [id number]         Date:  2023      Surgeon: Guerita Sommer):  Matthew Harris MD    Procedure: Procedure(s):  LEFT KNEE RETAINED HARDWARE REMOVAL/LEFT TOTAL KNEE ARTHROPLASTY/MEDACTA/2 SA'S/ADDUCTOR CANAL BLOCK; 23HR    Medications prior to admission:   Prior to Admission medications    Not on File       Current medications:    Current Facility-Administered Medications   Medication Dose Route Frequency Provider Last Rate Last Admin    lidocaine PF 1 % injection 1 mL  1 mL IntraDERmal Once PRN Laura Cloud, APRN - CRNA        famotidine (PEPCID) tablet 20 mg  20 mg Oral Once Laura Cloud, APRN - CRNA        lactated ringers IV soln infusion   IntraVENous Continuous Laura Cloud, APRN - CRNA        sodium chloride flush 0.9 % injection 5-40 mL  5-40 mL IntraVENous PRN Laura Cloud, APRN - CRNA        midazolam (VERSED) PF injection 2 mg/2 mL  2 mg IntraVENous Once Laura Cloud, APRN - CRNA        fentaNYL (SUBLIMAZE) injection 100 mcg  100 mcg IntraVENous Once Laura Cloud, APRN - CRNA        ropivacaine (NAROPIN) 0.2% injection 0.2% 30 mL  30 mL Other Once Laura Cloud, APRN - CRNA        vancomycin (VANCOCIN) 1,000 mg in sodium chloride 0.9 % 250 mL (vial-mate) IVPB  1,000 mg IntraVENous On Call to [address] [place] [address], PA-C        ceFAZolin (ANCEF) injection 1,000 mg  1,000 mg IntraMUSCular On Call to [address] [place] [address], PA-C        celecoxib (CELEBREX) capsule 200 mg  200 mg Oral On Call to [address] [place] [address], PA-C        gabapentin (NEURONTIN) capsule 100 mg  100 mg Oral On Call to [address] [place] [address], PA-C        aprepitant (EMEND) capsule 40 mg  40 mg Oral On Call to [address] [place] [address], PA-C        acetaminophen (TYLENOL) tablet 650 mg  650 mg Oral On Call to [address] [place] [address], PA-C           Allergies:  No Known Allergies    Problem List:  There is no problem list on file for this patient. Past Medical History:  History reviewed. No pertinent past medical history. Past Surgical History:        Procedure Laterality Date    HERNIA REPAIR      KNEE SURGERY Left        Social History:    Social History     Tobacco Use    Smoking status: Never    Smokeless tobacco: Never   Substance Use Topics    Alcohol use: Not Currently                                Counseling given: Not Answered      Vital Signs (Current):   Vitals:    02/20/23 0914   Weight: 221 lb (100.2 kg)   Height: 5' 10\" (1.778 m)                                              BP Readings from Last 3 Encounters:   02/15/23 115/80       NPO Status: Time of last liquid consumption: 2000                        Time of last solid consumption: 2000                                                      BMI:   Wt Readings from Last 3 Encounters:   02/20/23 221 lb (100.2 kg)   02/15/23 221 lb (100.2 kg)   07/21/22 233 lb 9.6 oz (106 kg)     Body mass index is 31.71 kg/m². CBC:   Lab Results   Component Value Date/Time    WBC 6.1 02/06/2023 10:30 AM    RBC 4.80 02/06/2023 10:30 AM    HGB 15.1 02/06/2023 10:30 AM    HCT 43.4 02/06/2023 10:30 AM    MCV 90.4 02/06/2023 10:30 AM    RDW 12.7 02/06/2023 10:30 AM     02/06/2023 10:30 AM       CMP:   Lab Results   Component Value Date/Time     02/06/2023 10:30 AM    K 4.8 02/06/2023 10:30 AM     02/06/2023 10:30 AM    CO2 31 02/06/2023 10:30 AM    BUN 15 02/06/2023 10:30 AM    CREATININE 1.24 02/06/2023 10:30 AM    AGRATIO 1.2 02/06/2023 10:30 AM    GLUCOSE 76 02/06/2023 10:30 AM    PROT 8.0 02/06/2023 10:30 AM    CALCIUM 9.7 02/06/2023 10:30 AM    BILITOT 1.0 02/06/2023 10:30 AM    ALKPHOS 134 02/06/2023 10:30 AM    AST 23 02/06/2023 10:30 AM    ALT 23 02/06/2023 10:30 AM       POC Tests: No results for input(s): POCGLU, POCNA, POCK, POCCL, POCBUN, POCHEMO, POCHCT in the last 72 hours.     Coags:   Lab Results   Component Value Date/Time    PROTIME 14.1 02/06/2023 10:30 AM    INR 1.1 02/06/2023 10:30 AM    APTT 31.7 02/06/2023 10:30 AM       HCG (If Applicable): No results found for: PREGTESTUR, PREGSERUM, HCG, HCGQUANT     ABGs: No results found for: PHART, PO2ART, CPW6YSZ, TUS5OUW, BEART, N4SFZGJH     Type & Screen (If Applicable):  No results found for: LABABO, LABRH    Drug/Infectious Status (If Applicable):  No results found for: HIV, HEPCAB    COVID-19 Screening (If Applicable):   Lab Results   Component Value Date/Time    COVID19 Not detected 02/21/2023 08:20 AM    COVID19 Not detected 01/08/2023 07:09 PM           Anesthesia Evaluation  Patient summary reviewed and Nursing notes reviewed no history of anesthetic complications:   Airway: Mallampati: II  TM distance: <3 FB   Neck ROM: full  Mouth opening: > = 3 FB   Dental: normal exam         Pulmonary:Negative Pulmonary ROS and normal exam                               Cardiovascular:Negative CV ROS                      Neuro/Psych:   Negative Neuro/Psych ROS              GI/Hepatic/Renal: Neg GI/Hepatic/Renal ROS            Endo/Other: Negative Endo/Other ROS                    Abdominal:             Vascular: negative vascular ROS. Other Findings:           Anesthesia Plan      general and regional     ASA 1       Induction: intravenous. Anesthetic plan and risks discussed with patient.                         Rebecca Bose MD   2/21/2023

## 2023-02-21 NOTE — ANESTHESIA POSTPROCEDURE EVALUATION
Department of Anesthesiology  Postprocedure Note    Patient: Patsy Laws  MRN: [id number]  YOB: 1970  Date of evaluation: 2/21/2023      Procedure Summary     Date: 02/21/23 Room / Location: [facility] MAIN 06 / [facility] MAIN OR    Anesthesia Start: 8279 Anesthesia Stop: 1688    Procedure: LEFT KNEE RETAINED HARDWARE REMOVAL/LEFT TOTAL KNEE ARTHROPLASTY/MEDACTA/2 SA'S/ADDUCTOR CANAL BLOCK; 23HR (Left: Knee) Diagnosis:       Post-traumatic arthritis of lower leg, left      (M17.32, T84.093A LEFT KNEE POST TRAUMATIC ARTHRITIS; S/P)    Surgeons: Skye Roberson MD Responsible Provider: Medardo Gallagher MD    Anesthesia Type: General, Regional ASA Status: 1          Anesthesia Type: General, Regional    Juan R Phase I: Juan R Score: 10    Juan R Phase II:        Anesthesia Post Evaluation    Patient location during evaluation: PACU  Patient participation: complete - patient participated  Level of consciousness: sleepy but conscious  Pain score: 0  Airway patency: patent  Nausea & Vomiting: no nausea and no vomiting  Complications: no  Cardiovascular status: blood pressure returned to baseline  Respiratory status: acceptable  Hydration status: euvolemic

## 2023-02-21 NOTE — OP NOTE
Operative Note      Patient: Becca Joy     Date of Surgery: 2/21/2023         YOB: 1970      Age:  48 y.o.        LOS:  LOS: 0 days       Preoperative Diagnosis:  M17.32, T84.093A LEFT KNEE POST TRAUMATIC ARTHRITIS; S/P    Postoperative Diagnosis: * No post-op diagnosis entered *      Surgeon:  Tej Coppola MD     Assistant:  Dominique Ruth    Anesthesia:  general anesthesia and adductor block     Procedure:  Procedure(s):  LEFT KNEE RETAINED HARDWARE REMOVAL/LEFT TOTAL KNEE ARTHROPLASTY/MEDACTA/2 SA'S/ADDUCTOR CANAL BLOCK; 23HR    Time out performed: YES    Estimated Blood Loss:  min           Implants:    Implant Name Type Inv. Item Serial No.  Lot No. LRB No. Used Action   METACEM-X HV - [id number]  METACEM-X HV  MEDACTA [employer] [id number] Left 1 Implanted   COMPONENT PAT SZ 4 RESURF AUG GMK - [id number]  COMPONENT PAT SZ 4 RESURF AUG GMK  MEDACTA [facility] [id number] Left 1 Implanted   COMPONENT FEM SZ 6 LT POST STBL GEOVANY GMK - [id number]  COMPONENT FEM SZ 6 LT POST STBL GEOVANY GMK  MEDACTA [facility] [id number] Left 1 Implanted   COMPONENT TIB SZ 6 LT FIX GEOVANY GMK - [id number]  COMPONENT TIB SZ 6 LT FIX GEOVANY GMK  MEDACTA [facility] [id number] Left 1 Implanted   extension stem    MEDACTA [employer] [id number] Left 1 Implanted   INSERT TIB SZ 6 [id number] POST STBL FIX GMK - [id number]  INSERT TIB SZ 6 [id number] POST STBL FIX GMK  MEDACTA [facility] [id number] Left 1 Implanted       Specimens: * No specimens in log *            Complications:  None    DESCRIPTION OF PROCEDURE: After satisfactory general and adductor block anesthesia, in the supine position, patient's knee was prepped with ChloraPrep solution and draped in the usual fashion for knee replacement. The tourniquet was inflated to 350 mmHg after exsanguination and elevation. A longitudinal anterior skin incision was made carried down through the subcutaneus tissue to the underlying extensor mechanism.  A medial parapatellar arthrotomy was carried proximally in a subvastus approach sparing the vastus medialis obliqus insertion on the quadriceps tendon. The patella was everted and retracted laterally as the knee was flexed. An anterior joint line debridement was carried out. The proximal tibial cut was made using the extramedullary tibial cutting guide and an oscillating saw. The distal femoral cuts were made using an intramedullary femoral cutting guide and a 5 degree cutting angle based on preoperative x-ray measurements of the femur. The distal femoral resection was set at 11 mm. The bone mill notch preparation was made for a #6 left posterior stabilized femoral component. Osteophytes were removed from the joint margins including the posterior femoral condyles. The tibial stem hole and fin impressions were made for the #6 left tibial base plate. The patella was cut to recreate the native patellar thickness of 27 mm. Three drill holes were placed in the patella for a #4 three pegged all polyethylene patellar component. Trial components were inserted and the knee was found to have full extension and good stability with a 14 mm PS tibial spacer in place. Trial components were then removed and the knee was irrigated thoroughly. The knee was injected with 266 mg/20 cc Exparel mixed with 40 ml saline. The actual components were then cemented into place with gentamycin bone cement. All excess cement was carefully removed. The knee was held in extension with a clamp on the patella. Once the cement fully hardened, the knee was again checked for excess cement. The permanent PS E poly tibial spacer was attached to the tibial base plate with a locking screw. The knee was reduced and was found to be stable with full knee extension, good stability, and mid line patellar tracking with no thumb pressure applied. The knee was again irrigated thoroughly with Irricept and Pulsavac saline lavage. The tourniquet was deflated prior to closure.   Adequate hemostasis was achieved with the electrocautery. Closure was obtained using #2 Vicryl interrupted sutures for the capsular closure, 2-0 Vicryl suture for the subcutaneous tissues and staples for the skin. A sterile dressing was applied and Mr. Fili Adame was transported to the recovery room in good condition. Sponge and needle count was correct.

## 2023-02-21 NOTE — INTERVAL H&P NOTE
Update History & Physical    The patient's History and Physical of February 21, 2023 was reviewed with the patient and I examined the patient. There was no change. The surgical site was confirmed by the patient and me. Plan: The risks, benefits, expected outcome, and alternative to the recommended procedure have been discussed with the patient. Patient understands and wants to proceed with the procedure.      Electronically signed by Princess Dunne MD on 2/21/2023 at 9:37 AM

## 2023-02-22 ENCOUNTER — HOME HEALTH ADMISSION (OUTPATIENT)
Age: 53
End: 2023-02-22
Payer: COMMERCIAL

## 2023-02-22 VITALS
SYSTOLIC BLOOD PRESSURE: 120 MMHG | WEIGHT: 221 LBS | TEMPERATURE: 97.6 F | DIASTOLIC BLOOD PRESSURE: 72 MMHG | RESPIRATION RATE: 18 BRPM | HEIGHT: 70 IN | HEART RATE: 78 BPM | OXYGEN SATURATION: 95 % | BODY MASS INDEX: 31.64 KG/M2

## 2023-02-22 PROBLEM — Z96.652 S/P TOTAL KNEE ARTHROPLASTY, LEFT: Status: RESOLVED | Noted: 2023-02-21 | Resolved: 2023-02-22

## 2023-02-22 PROBLEM — M17.32 POST-TRAUMATIC OSTEOARTHRITIS OF LEFT KNEE: Status: RESOLVED | Noted: 2023-02-21 | Resolved: 2023-02-22

## 2023-02-22 PROBLEM — M17.12 ARTHRITIS OF LEFT KNEE: Status: RESOLVED | Noted: 2023-02-21 | Resolved: 2023-02-22

## 2023-02-22 PROCEDURE — 97116 GAIT TRAINING THERAPY: CPT

## 2023-02-22 PROCEDURE — 97161 PT EVAL LOW COMPLEX 20 MIN: CPT

## 2023-02-22 PROCEDURE — 6370000000 HC RX 637 (ALT 250 FOR IP): Performed by: SPECIALIST

## 2023-02-22 PROCEDURE — G0378 HOSPITAL OBSERVATION PER HR: HCPCS

## 2023-02-22 PROCEDURE — 6360000002 HC RX W HCPCS: Performed by: SPECIALIST

## 2023-02-22 PROCEDURE — 2580000003 HC RX 258: Performed by: SPECIALIST

## 2023-02-22 PROCEDURE — 97165 OT EVAL LOW COMPLEX 30 MIN: CPT

## 2023-02-22 PROCEDURE — 2580000003 HC RX 258: Performed by: NURSE ANESTHETIST, CERTIFIED REGISTERED

## 2023-02-22 RX ORDER — DOCUSATE SODIUM 100 MG/1
100 CAPSULE, LIQUID FILLED ORAL 2 TIMES DAILY
Qty: 60 CAPSULE | Refills: 0 | Status: SHIPPED | OUTPATIENT
Start: 2023-02-22 | End: 2023-03-24

## 2023-02-22 RX ORDER — ACETAMINOPHEN 500 MG
1000 TABLET ORAL 3 TIMES DAILY
Qty: 120 TABLET | Refills: 1 | Status: SHIPPED | OUTPATIENT
Start: 2023-02-22

## 2023-02-22 RX ORDER — ASPIRIN 81 MG/1
81 TABLET ORAL 2 TIMES DAILY
Qty: 90 TABLET | Refills: 1 | Status: SHIPPED | OUTPATIENT
Start: 2023-02-22

## 2023-02-22 RX ORDER — TRAMADOL HYDROCHLORIDE 50 MG/1
50 TABLET ORAL EVERY 6 HOURS PRN
Qty: 30 TABLET | Refills: 0 | Status: SHIPPED | OUTPATIENT
Start: 2023-02-22 | End: 2023-02-27

## 2023-02-22 RX ORDER — PANTOPRAZOLE SODIUM 40 MG/1
40 TABLET, DELAYED RELEASE ORAL DAILY
Qty: 60 TABLET | Refills: 5 | Status: SHIPPED | OUTPATIENT
Start: 2023-02-22

## 2023-02-22 RX ORDER — OXYCODONE HYDROCHLORIDE 5 MG/1
5 TABLET ORAL
Qty: 28 TABLET | Refills: 0 | Status: SHIPPED | OUTPATIENT
Start: 2023-02-22 | End: 2023-03-01

## 2023-02-22 RX ORDER — MELOXICAM 15 MG/1
15 TABLET ORAL DAILY
Qty: 30 TABLET | Refills: 3 | Status: SHIPPED | OUTPATIENT
Start: 2023-02-22

## 2023-02-22 RX ADMIN — ASPIRIN 81 MG: 81 TABLET, COATED ORAL at 09:32

## 2023-02-22 RX ADMIN — KETOROLAC TROMETHAMINE 15 MG: 15 INJECTION, SOLUTION INTRAMUSCULAR; INTRAVENOUS at 03:28

## 2023-02-22 RX ADMIN — CEFAZOLIN 2000 MG: 1 INJECTION, POWDER, FOR SOLUTION INTRAMUSCULAR; INTRAVENOUS at 03:28

## 2023-02-22 RX ADMIN — KETOROLAC TROMETHAMINE 15 MG: 15 INJECTION, SOLUTION INTRAMUSCULAR; INTRAVENOUS at 09:32

## 2023-02-22 RX ADMIN — SODIUM CHLORIDE, PRESERVATIVE FREE 10 ML: 5 INJECTION INTRAVENOUS at 11:32

## 2023-02-22 RX ADMIN — TRAMADOL HYDROCHLORIDE 50 MG: 50 TABLET, COATED ORAL at 09:45

## 2023-02-22 RX ADMIN — SODIUM CHLORIDE, PRESERVATIVE FREE 10 ML: 5 INJECTION INTRAVENOUS at 11:09

## 2023-02-22 RX ADMIN — ACETAMINOPHEN 1000 MG: 500 TABLET ORAL at 06:12

## 2023-02-22 RX ADMIN — BISACODYL 5 MG: 5 TABLET, COATED ORAL at 09:33

## 2023-02-22 RX ADMIN — SODIUM CHLORIDE, PRESERVATIVE FREE 10 ML: 5 INJECTION INTRAVENOUS at 11:29

## 2023-02-22 RX ADMIN — POLYETHYLENE GLYCOL 3350 17 G: 17 POWDER, FOR SOLUTION ORAL at 09:33

## 2023-02-22 ASSESSMENT — PAIN DESCRIPTION - LOCATION: LOCATION: KNEE

## 2023-02-22 ASSESSMENT — PAIN SCALES - GENERAL: PAINLEVEL_OUTOF10: 6

## 2023-02-22 ASSESSMENT — PAIN DESCRIPTION - ORIENTATION: ORIENTATION: LEFT

## 2023-02-22 NOTE — DISCHARGE INSTRUCTIONS
DISCHARGE SUMMARY from Nurse    PATIENT INSTRUCTIONS:    After general anesthesia or intravenous sedation, for 24 hours or while taking prescription Narcotics:  Limit your activities  Do not drive and operate hazardous machinery  Do not make important personal or business decisions  Do  not drink alcoholic beverages  If you have not urinated within 8 hours after discharge, please contact your surgeon on call. Report the following to your surgeon:  Excessive pain, swelling, redness or odor of or around the surgical area  Temperature over 100.5  Nausea and vomiting lasting longer than 4 hours or if unable to take medications  Any signs of decreased circulation or nerve impairment to extremity: change in color, persistent  numbness, tingling, coldness or increase pain  Any questions    What to do at Home  Recommended activity: activity as tolerated,     If you experience any of the following symptoms chest pain, fever greater than 100.5, pain unrelieved by medication, please follow up with Dr. Mayito Hogan. *  Please give a list of your current medications to your Primary Care Provider. *  Please update this list whenever your medications are discontinued, doses are      changed, or new medications (including over-the-counter products) are added. *  Please carry medication information at all times in case of emergency situations. These are general instructions for a healthy lifestyle:    No smoking/ No tobacco products/ Avoid exposure to second hand smoke  Surgeon General's Warning:  Quitting smoking now greatly reduces serious risk to your health.     Obesity, smoking, and sedentary lifestyle greatly increases your risk for illness    A healthy diet, regular physical exercise & weight monitoring are important for maintaining a healthy lifestyle    You may be retaining fluid if you have a history of heart failure or if you experience any of the following symptoms:  Weight gain of 3 pounds or more overnight or 5 pounds in a week, increased swelling in our hands or feet or shortness of breath while lying flat in bed. Please call your doctor as soon as you notice any of these symptoms; do not wait until your next office visit. Patient armband removed and shredded   Thank you for enrolling in [address] Please follow the instructions below to securely access your online medical record. Fios allows you to send messages to your doctor, view your test results, renew your prescriptions, schedule appointments, and more. How Do I Sign Up? In your Internet browser, go to [email or web address]  Click on the Sign Up Now link in the Sign In box. You will see the New Member Sign Up page. Enter your Fios Access Code exactly as it appears below. You will not need to use this code after youve completed the sign-up process. If you do not sign up before the expiration date, you must request a new code. Fios Access Code: [email or web address]  Expires: 4/8/2023  9:54 AM    Enter your Social Security Number ([id number]) and Date of Birth (mm/dd/yyyy) as indicated and click Submit. You will be taken to the next sign-up page. Create a Fios ID. This will be your Fios login ID and cannot be changed, so think of one that is secure and easy to remember. Create a Fios password. You can change your password at any time. Enter your Password Reset Question and Answer. This can be used at a later time if you forget your password. Enter your e-mail address. You will receive e-mail notification when new information is available in [address] Click Sign Up. You can now view your medical record. Additional Information  If you have questions, please contact your physician practice where you receive care. Remember, [employer]ios is NOT to be used for urgent needs. For medical emergencies, dial 911. The discharge information has been reviewed with the {PATIENT PARENT GUARDIAN:46669}.   The {PATIENT PARENT GUARDIAN:54670} verbalized understanding. Discharge medications reviewed with the {Dishcarge meds reviewed GPIW:60678} and appropriate educational materials and side effects teaching were provided. ___________________________________________________________________________________________________________________________________  DISCHARGE SUMMARY from Nurse    PATIENT INSTRUCTIONS:    After general anesthesia or intravenous sedation, for 24 hours or while taking prescription Narcotics:  Limit your activities  Do not drive and operate hazardous machinery  Do not make important personal or business decisions  Do  not drink alcoholic beverages  If you have not urinated within 8 hours after discharge, please contact your surgeon on call. Report the following to your surgeon:  Excessive pain, swelling, redness or odor of or around the surgical area  Temperature over 100.5  Nausea and vomiting lasting longer than 4 hours or if unable to take medications  Any signs of decreased circulation or nerve impairment to extremity: change in color, persistent  numbness, tingling, coldness or increase pain  Any questions    What to do at Home:  Recommended activity: {discharge activity:96916}, ***    If you experience any of the following symptoms ***, please follow up with ***. *  Please give a list of your current medications to your Primary Care Provider. *  Please update this list whenever your medications are discontinued, doses are      changed, or new medications (including over-the-counter products) are added. *  Please carry medication information at all times in case of emergency situations. These are general instructions for a healthy lifestyle:    No smoking/ No tobacco products/ Avoid exposure to second hand smoke  Surgeon General's Warning:  Quitting smoking now greatly reduces serious risk to your health.     Obesity, smoking, and sedentary lifestyle greatly increases your risk for illness    A healthy diet, regular physical exercise & weight monitoring are important for maintaining a healthy lifestyle    You may be retaining fluid if you have a history of heart failure or if you experience any of the following symptoms:  Weight gain of 3 pounds or more overnight or 5 pounds in a week, increased swelling in our hands or feet or shortness of breath while lying flat in bed. Please call your doctor as soon as you notice any of these symptoms; do not wait until your next office visit. The discharge information has been reviewed with the {PATIENT PARENT GUARDIAN:52739}. The {PATIENT PARENT GUARDIAN:48825} verbalized understanding. Discharge medications reviewed with the {Dishcarge meds reviewed LYFM:58712} and appropriate educational materials and side effects teaching were provided. ___________________________________________________________________________________________________________________________________  DISCHARGE SUMMARY from Nurse    PATIENT INSTRUCTIONS:    After general anesthesia or intravenous sedation, for 24 hours or while taking prescription Narcotics:  Limit your activities  Do not drive and operate hazardous machinery  Do not make important personal or business decisions  Do  not drink alcoholic beverages  If you have not urinated within 8 hours after discharge, please contact your surgeon on call.     Report the following to your surgeon:  Excessive pain, swelling, redness or odor of or around the surgical area  Temperature over 100.5  Nausea and vomiting lasting longer than 4 hours or if unable to take medications  Any signs of decreased circulation or nerve impairment to extremity: change in color, persistent  numbness, tingling, coldness or increase pain  Any questions    What to do at Home:  Recommended activity: activity as tolerated,     If you experience any of the following symptoms chest pain, fever greater than 100.5, pain unrelieved by medcation, please follow up with  [facility]. *  Please give a list of your current medications to your Primary Care Provider. *  Please update this list whenever your medications are discontinued, doses are      changed, or new medications (including over-the-counter products) are added. *  Please carry medication information at all times in case of emergency situations. These are general instructions for a healthy lifestyle:    No smoking/ No tobacco products/ Avoid exposure to second hand smoke  Surgeon General's Warning:  Quitting smoking now greatly reduces serious risk to your health. Obesity, smoking, and sedentary lifestyle greatly increases your risk for illness    A healthy diet, regular physical exercise & weight monitoring are important for maintaining a healthy lifestyle    You may be retaining fluid if you have a history of heart failure or if you experience any of the following symptoms:  Weight gain of 3 pounds or more overnight or 5 pounds in a week, increased swelling in our hands or feet or shortness of breath while lying flat in bed. Please call your doctor as soon as you notice any of these symptoms; do not wait until your next office visit. Patient armband removed and shredded   Thank you for enrolling in [address] Please follow the instructions below to securely access your online medical record. [employer] allows you to send messages to your doctor, view your test results, renew your prescriptions, schedule appointments, and more. How Do I Sign Up? In your Internet browser, go to [email or web address]  Click on the Sign Up Now link in the Sign In box. You will see the New Member Sign Up page. Enter your Photoblog Access Code exactly as it appears below. You will not need to use this code after youve completed the sign-up process. If you do not sign up before the expiration date, you must request a new code.   Photoblog Access Code: [email or web address]  Expires: 4/8/2023  9:54 AM    Enter your Social Security Number ([id number]) and Date of Birth (mm/dd/yyyy) as indicated and click Submit. You will be taken to the next sign-up page. Create a [employer] ID. This will be your [employer] login ID and cannot be changed, so think of one that is secure and easy to remember. Create a [employer] password. You can change your password at any time. Enter your Password Reset Question and Answer. This can be used at a later time if you forget your password. Enter your e-mail address. You will receive e-mail notification when new information is available in [address] Click Sign Up. You can now view your medical record. Additional Information  If you have questions, please contact your physician practice where you receive care. Remember, [employer] is NOT to be used for urgent needs. For medical emergencies, dial 911. The discharge information has been reviewed with the {PATIENT PARENT GUARDIAN:93178}. The {PATIENT PARENT GUARDIAN:02377} verbalized understanding. Discharge medications reviewed with the {Dishcarge meds reviewed XSRU:87235} and appropriate educational materials and side effects teaching were provided.   ___________________________________________________________________________________________________________________________________

## 2023-02-22 NOTE — PROGRESS NOTES
Ortho    Pt. Seen and evaluated. Yudelka Adas well, progressing well, pain well controlled  Denies cp, sob, abd pain    Blood pressure 120/72, pulse 78, temperature 97.6 °F (36.4 °C), temperature source Oral, resp. rate 18, height 5' 10\" (1.778 m), weight 221 lb (100.2 kg), SpO2 95 %. leftKnee woundclean, dry, no drainage  Sensory intact to LT  Motor intact  nv intact  Neg calf tenderness    Labs:  CBC    Coagulation  Lab Results   Component Value Date    INR 1.1 02/06/2023    APTT 31.7 02/06/2023      Basic Metabolic Profile  Lab Results   Component Value Date     02/06/2023    CO2 31 02/06/2023    BUN 15 02/06/2023    GLUCOSE 76 02/06/2023    CALCIUM 9.7 02/06/2023       Assesment: leftOrthopedic / Rheumatologic: Total Knee Replacement  History reviewed. No pertinent past medical history. Plan: aspirin, PT- wbat. Home today with .

## 2023-02-22 NOTE — DISCHARGE SUMMARY
2/21/2023  8:04 AM    2/22/2023, 12:06 PM    Primary Dx:left Orthopedic / Rheumatologic: Total Knee Replacement  Secondary Dx: Etiological Diagnoses: none    HPI:  Pt has end stage OA of their left knee and had failed conservative treatment. Due to the current findings and affected activity of daily living surgical intervention is indicated. The alternatives, risks, complications as well as expected outcome were discussed, the patient understands and wishes to proceed with surgery    History reviewed. No pertinent past medical history.       Current Facility-Administered Medications:     dextrose 5 % in lactated ringers infusion, , IntraVENous, Continuous, Venus Moncada MD, Stopped at 02/22/23 1109    sodium chloride flush 0.9 % injection 5-40 mL, 5-40 mL, IntraVENous, 2 times per day, Venus Moncada MD, 10 mL at 02/22/23 1109    sodium chloride flush 0.9 % injection 5-40 mL, 5-40 mL, IntraVENous, PRN, Venus Moncada MD, 10 mL at 02/22/23 1129    0.9 % sodium chloride infusion, , IntraVENous, PRN, Venus Moncada MD, Stopped at 02/22/23 1107    acetaminophen (TYLENOL) tablet 1,000 mg, 1,000 mg, Oral, 3 times per day, Venus Moncada MD, 1,000 mg at 02/22/23 8239    ketorolac (TORADOL) injection 15 mg, 15 mg, IntraVENous, Q6H, Venus Moncada MD, 15 mg at 02/22/23 0932    ondansetron (ZOFRAN) injection 4 mg, 4 mg, IntraVENous, Q4H PRN, Venus Moncada MD    polyethylene glycol (GLYCOLAX) packet 17 g, 17 g, Oral, Daily, Venus Moncada MD, 17 g at 02/22/23 0933    bisacodyl (DULCOLAX) EC tablet 5 mg, 5 mg, Oral, Daily, Venus Moncada MD, 5 mg at 02/22/23 9109    hydrOXYzine HCl (ATARAX) tablet 10 mg, 10 mg, Oral, Q8H PRN, Venus Moncada MD    oxyCODONE (ROXICODONE) immediate release tablet 5 mg, 5 mg, Oral, Q4H PRN, Venus Moncada MD    aspirin EC tablet 81 mg, 81 mg, Oral, BID, Venus Moncada MD, 81 mg at 02/22/23 0932    lactated ringers IV soln infusion, , IntraVENous, Gen, Alma Delia Anderson, APRN - CRNA, Stopped at 02/22/23 1108    traMADol (ULTRAM) tablet 50 mg, 50 mg, Oral, Q6H PRN, Andre Good MD, 50 mg at 02/22/23 0945    Patient has no known allergies. Physical Exam:  General A&O x3 NAD, well developed, well nourished, normal affect  Heart: S1-S2, RRR  Lungs: CTA Bilat  Abd: soft NT, ND  Ext: n/v intact    Hospital Course:    Pt. Had leftOrthopedic / Rheumatologic: Total Knee Replacement    Post -op Course: The patient tolerated the procedure well. Pt. Was place on Abx pre and post-op for prophylaxis against infection as well as aspirin post-op for prophylaxis against DVT. Vitals signs remained stable, remained af. The wound wasclean, dry, no drainage. Pain was well controlled. Pt. Had negative calf tenderness or swelling, no evidence for DVT. Patient had PT/OT consult for evaluation and treatment.     CBC  Lab Results   Component Value Date/Time    WBC 6.1 02/06/2023 10:30 AM    RBC 4.80 02/06/2023 10:30 AM    HCT 43.4 02/06/2023 10:30 AM    MCV 90.4 02/06/2023 10:30 AM    MCH 31.5 02/06/2023 10:30 AM    MCHC 34.8 02/06/2023 10:30 AM    RDW 12.7 02/06/2023 10:30 AM    MPV 11.1 02/06/2023 10:30 AM     Coagulation  Lab Results   Component Value Date    INR 1.1 02/06/2023    APTT 31.7 02/06/2023      Basic Metabolic Profile  Lab Results   Component Value Date     02/06/2023    CO2 31 02/06/2023    BUN 15 02/06/2023    GLUCOSE 76 02/06/2023    CALCIUM 9.7 02/06/2023       Discharge Meds:  Current Discharge Medication List        START taking these medications    Details   docusate sodium (COLACE) 100 MG capsule Take 1 capsule by mouth 2 times daily  Qty: 60 capsule, Refills: 0      acetaminophen (TYLENOL) 500 MG tablet Take 2 tablets by mouth in the morning, at noon, and at bedtime  Qty: 120 tablet, Refills: 1      pantoprazole (PROTONIX) 40 MG tablet Take 1 tablet by mouth daily  Qty: 60 tablet, Refills: 5      traMADol (ULTRAM) 50 MG tablet Take 1 tablet by mouth every 6 hours as needed for Pain for up to 5 days. Intended supply: 5 days. Take lowest dose possible to manage pain Max Daily Amount: 200 mg  Qty: 30 tablet, Refills: 0    Comments: Reduce doses taken as pain becomes manageable  Associated Diagnoses: S/P total knee arthroplasty, left      meloxicam (MOBIC) 15 MG tablet Take 1 tablet by mouth daily  Qty: 30 tablet, Refills: 3      oxyCODONE (ROXICODONE) 5 MG immediate release tablet Take 1 tablet by mouth nightly as needed for Pain for up to 7 days. Intended supply: 7 days. Take lowest dose possible to manage pain Max Daily Amount: 5 mg  Qty: 28 tablet, Refills: 0    Comments: Reduce doses taken as pain becomes manageable  Associated Diagnoses: S/P total knee arthroplasty, left      aspirin EC 81 MG EC tablet Take 1 tablet by mouth 2 times daily  Qty: 90 tablet, Refills: 1           STOP taking these medications       Naproxen Sodium 220 MG CAPS Comments:   Reason for Stopping:         cephALEXin (KEFLEX) 500 MG capsule Comments:   Reason for Stopping:         naproxen (NAPROSYN) 500 MG tablet Comments:   Reason for Stopping:               Discharge Plan:  The patient will be d/c'd to home, total knee protocol, WBAT. They will have [facility] PT and nursing. Total joint protocol. Pt safe for homebound transfer, after being cleared by PT, sp Total joint replacement. A walker, bedside commode, and shower chair will be utilized for ADL's. Follow up in 14 days. Call with any questions or concerns.

## 2023-02-22 NOTE — DISCHARGE SUMMARY
DISCHARGE SUMMARY    ADMISSION DIAGNOSIS: Post-traumatic arthritis of lower leg, left [M17.32]  Post-traumatic osteoarthritis of left knee [M17.32]  S/P total knee arthroplasty, left [Z96.652]    DISCHARGE DIAGNOSIS: Status post Procedure(s):  LEFT KNEE RETAINED HARDWARE REMOVAL/LEFT TOTAL KNEE ARTHROPLASTY/MEDACTA/2 SA'S/ADDUCTOR CANAL BLOCK; 23HR    Subjective: 48 y.o., male, 1 Day Post-Op, Procedure(s):  LEFT KNEE RETAINED HARDWARE REMOVAL/LEFT TOTAL KNEE ARTHROPLASTY/MEDACTA/2 SA'S/ADDUCTOR CANAL BLOCK; 23HR     Admitting date: 2/21/2023  8:04 AM     Date of Discharge/Transfer: 02/22/23     Condition at Discharge: Stable and cleared to advance to next level of care / rehabilitation. History:  History reviewed. No pertinent past medical history. History of Present Illness:     Mr. Fab Rush underwent successful Procedure(s):  LEFT KNEE RETAINED HARDWARE REMOVAL/LEFT TOTAL KNEE ARTHROPLASTY/MEDACTA/2 SA'S/ADDUCTOR CANAL BLOCK; 23HR. He is doing well and ready for discharge. PAST MEDICAL HISTORY: History reviewed. No pertinent past medical history. PAST SURGICAL HISTORY:   Past Surgical History:   Procedure Laterality Date    HERNIA REPAIR      KNEE SURGERY Left     TOTAL KNEE ARTHROPLASTY Left 2/21/2023    LEFT KNEE RETAINED HARDWARE REMOVAL/LEFT TOTAL KNEE ARTHROPLASTY/MEDACTA/2 SA'S/ADDUCTOR CANAL BLOCK; 23HR performed by Al Kirkland MD at [address] [place] [address]: No Known Allergies     CURRENT MEDICATIONS:  A list of medications prior to the time of admission include:  Prior to Admission medications    Medication Sig Start Date End Date Taking?  Authorizing Provider   docusate sodium (COLACE) 100 MG capsule Take 1 capsule by mouth 2 times daily 2/22/23 3/24/23 Yes Al Kirkland MD   acetaminophen (TYLENOL) 500 MG tablet Take 2 tablets by mouth in the morning, at noon, and at bedtime 2/22/23  Yes Al Kirkland MD   pantoprazole (PROTONIX) 40 MG tablet Take 1 tablet by mouth daily 2/22/23  Yes Nirmala Whitney MD   traMADol (ULTRAM) 50 MG tablet Take 1 tablet by mouth every 6 hours as needed for Pain for up to 5 days. Intended supply: 5 days. Take lowest dose possible to manage pain Max Daily Amount: 200 mg 2/22/23 2/27/23 Yes Nirmala Whitney MD   meloxicam (MOBIC) 15 MG tablet Take 1 tablet by mouth daily 2/22/23  Yes Nirmala Whitney MD   oxyCODONE (ROXICODONE) 5 MG immediate release tablet Take 1 tablet by mouth nightly as needed for Pain for up to 7 days. Intended supply: 7 days. Take lowest dose possible to manage pain Max Daily Amount: 5 mg 2/22/23 3/1/23 Yes Nirmala Whitney MD   aspirin EC 81 MG EC tablet Take 1 tablet by mouth 2 times daily 2/22/23  Yes Nirmala Whitney MD       Hospital Course:   Mr. Bozena Armstrong was admitted to [facility] on the morning of 2/21/2023  8:04 AM. He was taken to the operating room where he underwent a Procedure(s):  LEFT KNEE RETAINED HARDWARE REMOVAL/LEFT TOTAL KNEE ARTHROPLASTY/MEDACTA/2 SA'S/ADDUCTOR CANAL BLOCK; 23HR. He tolerated the procedure well, and there were no intra operative complications. Post operatively he was transferred medically stable to post op holding. After all safety criteria had been met during his immediate post op recovery phase He was transferred medical stable to 2 Surgical to begin comprehensive physical and occupational therapies, restorative nursing and thrombo embolism (DVT/PE) prophylaxis. Mr. Bozena Armstrong post operative course progressed slow but directed. The focus throughout the post op period focused on range of motion and pain control. Medically he remained stable through the remainder of the hospital stay. In light of the appropriate gains attained by Mr. Bozena Armstrong post operatively he is being recommended for a directed course of comprehensive PT / OT. DISCHARGE PLAN:      The patient will be discharged to home.     DIET:  Low Calorie High Protein Diet    NUTRITION: OTC Nutritional supplements, multivitamins      ACTIVITY: No lifting, Driving, or Strenuous exercise and No heavy lifting, pushing, pulling. Weight Bearing/Range of Motion Restrictions: Per Protocol    WOUND / INCISION CARE: Keep wound clean and dry, Reinforce dressing PRN and Ice to area for comfort Keep the current dressings on and in place. There is no need to change these current dressings. Dressings to please be changed by home nurse or nursing home staff each day. Keep all pets away from any wound present in order to prevent infection. PAIN CONTROL: Prescriptions written: On chart    PRECAUTIONS: Weight Bearing/Range of Motion per Restrictions: See Below    VTE PROPHYLAXIS: ASA 81mg (po) BID    ANTIBIOTICS: None at this time. MEDICATIONS AT DISCHARGE:  Current Discharge Medication List        START taking these medications    Details   docusate sodium (COLACE) 100 MG capsule Take 1 capsule by mouth 2 times daily  Qty: 60 capsule, Refills: 0      acetaminophen (TYLENOL) 500 MG tablet Take 2 tablets by mouth in the morning, at noon, and at bedtime  Qty: 120 tablet, Refills: 1      pantoprazole (PROTONIX) 40 MG tablet Take 1 tablet by mouth daily  Qty: 60 tablet, Refills: 5      traMADol (ULTRAM) 50 MG tablet Take 1 tablet by mouth every 6 hours as needed for Pain for up to 5 days. Intended supply: 5 days. Take lowest dose possible to manage pain Max Daily Amount: 200 mg  Qty: 30 tablet, Refills: 0    Comments: Reduce doses taken as pain becomes manageable  Associated Diagnoses: S/P total knee arthroplasty, left      meloxicam (MOBIC) 15 MG tablet Take 1 tablet by mouth daily  Qty: 30 tablet, Refills: 3      oxyCODONE (ROXICODONE) 5 MG immediate release tablet Take 1 tablet by mouth nightly as needed for Pain for up to 7 days. Intended supply: 7 days.  Take lowest dose possible to manage pain Max Daily Amount: 5 mg  Qty: 28 tablet, Refills: 0    Comments: Reduce doses taken as pain becomes manageable  Associated Diagnoses: S/P total knee arthroplasty, left      aspirin EC 81 MG EC tablet Take 1 tablet by mouth 2 times daily  Qty: 90 tablet, Refills: 1           STOP taking these medications       Naproxen Sodium 220 MG CAPS Comments:   Reason for Stopping:         cephALEXin (KEFLEX) 500 MG capsule Comments:   Reason for Stopping:         naproxen (NAPROSYN) 500 MG tablet Comments:   Reason for Stopping:                 Physical and Occupational therapies:    will continue with attention to standard postop precautions / restrictions and below:    [x] Full WBAT   [] Partial WBAT   [] Toetouch WB   [] Non Weight Bearing: Follow up:    [] 1 week  [x] 10 days  [] Specific Date:       Per [facility] Protocol this  case was discussed with attending surgeon and reflects their orders as confirmed by their co signature.       Esdras Bryant MD  [facility]  [address] [place] [address]

## 2023-02-22 NOTE — NURSE NAVIGATOR
Rounded on patient s/p left total knee replacement with Dr. Myron Cormier, [facility] 02/21/2023. Patient observed to be alert and oriented x 3, sitting up in bedside chair. He denies chest pain, shortness of breath, nausea, vomiting, fever, chills or calf pain. He is rating pain 6/10 at this time, LPN notified. Ace wrap noted to left lower extremity, dressing underneath noted as dry and intact with small amount of strike through noted. Per patient he has been up ambulating with use of rolling walker , voiding without difficulty. He endorses some numbness only to his left foot, otherwise no other numbness noted to his left lower extremity. Total knee replacement education book provided to patient along with education regarding the use of incentive spirometry. Encouraged use 10 times hourly for the next few days to keep lungs open and free from complications. Education provided regarding post operative showering. Patient educated that he may remove ace wrap after one day and shower as dressing is waterproof, no tubs or submersions in water is allowed. Dressing is to remain in place until removed by Dr. Myron Cormier or home health. Encouraged hourly ambulation 10 minutes every hour and icing 20 minutes hourly not to be placed directly on his skin to assist with pain and stiffness. Patient verbalized understanding of all information provided. All questions answered. Patient has cleared safe for discharge by PT/ OT. He will discharge with home health and home physical therapy. Awaiting PA to round prior to discharge. Will follow patient postoperatively.

## 2023-02-22 NOTE — PROGRESS NOTES
OCCUPATIONAL THERAPY EVALUATION/DISCHARGE    Patient: Gemma Grant (73 y.o. male)  Date: 2/22/2023  Primary Diagnosis: Post-traumatic arthritis of lower leg, left [M17.32]  Post-traumatic osteoarthritis of left knee [M17.32]  S/P total knee arthroplasty, left [Z96.652]  Procedure(s) (LRB):  LEFT KNEE RETAINED HARDWARE REMOVAL/LEFT TOTAL KNEE ARTHROPLASTY/MEDACTA/2 SA'S/ADDUCTOR CANAL BLOCK; 23HR (Left) 1 Day Post-Op   Precautions: Weight Bearing,  , Left Lower Extremity Weight Bearing: Weight Bearing As Tolerated  PLOF: Pt lives in [address] [place] [address], usually Mod Ind for ADLs and functional mobility using cane. ASSESSMENT AND RECOMMENDATIONS:    Based on the objective data described below, the patient demonstrates ability perform basic self care tasks and functional transfers without assistance. Pt performed UB/LB dressing with Mod Ind/Supervision. Pt benefits from occasional Supervision for functional mobility to the  with FWW for safety. Patient has all needed DME (FWW, shower chair) for home safety. Pt was left seated in the recliner with LEs elevated and towel roll under left ankle. All needs within reach. Maximum therapeutic gains met at current level of care and patient will be discharged from occupational therapy at this time. Further Equipment Recommendations for Discharge: NA    Discharge Recommendations: Home with Home health OT    AMPAC: Current research shows that an AM-PAC score of 18 or greater is associated with a discharge to the patient's home setting. Based on an AM-PAC score of 23/24 and their current ADL deficits; it is recommended that the patient have 2-3 sessions per week of Occupational Therapy at d/c to increase the patient's independence. This AMPAC score should be considered in conjunction with interdisciplinary team recommendations to determine the most appropriate discharge setting.  Patient's social support, diagnosis, medical stability, and prior level of function should also be taken into consideration. SUBJECTIVE:   Patient stated I am blessed.     OBJECTIVE DATA SUMMARY:   History reviewed. No pertinent past medical history. 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)  Type of Home: House  Home Layout: One level  Home Access: Stairs to enter with rails (R handrail)  Entrance Stairs - Number of Steps: 6  Bathroom Toilet: Standard  ADL Assistance: Independent  Homemaking Assistance: Independent  []  Right hand dominant   []  Left hand dominant    Cognitive/Behavioral Status:  Orientation Level: Oriented X4   Skin: visible skin intact  Edema: none noted    Vision/Perceptual:    Vision: Within Functional Limits       Coordination: BUE  Coordination: Within functional limits     Coordination: Generally decreased, functional      Balance:     Balance  Sitting: Intact  Standing: Intact (with RW)    Strength: BUE  Strength:  Within functional limits    Tone & Sensation: BUE  Tone: Normal  Sensation: Intact    Range of Motion: BUE  AROM: Within functional limits       Functional Mobility and Transfers for ADLs:  Bed Mobility:     Bed Mobility Training  Bed Mobility Training: Yes  Supine to Sit: Modified independent  Sit to Supine: Modified independent  Scooting: Modified independent  Transfers:   Transfer Training  Transfer Training: Yes  Sit to Stand: Supervision  Stand to Sit: Supervision    ADL Assessment:   Feeding: Independent  Grooming: Independent  UE Bathing: Modified independent   LE Bathing: Modified independent   UE Dressing: Modified independent   LE Dressing: Modified independent /supervision  Toileting: Modified independent Pain:  Pain level pre-treatment: not rated  Pain level post-treatment: not rated    Activity Tolerance:   Activity Tolerance: Patient Tolerated treatment well  Please refer to the flowsheet for vital signs taken during this treatment. After treatment:   [] Patient left in no apparent distress sitting up in chair  [x] Patient left in no apparent distress in bed  [x] Call bell left within reach  [x] Nursing notified  [] Caregiver present  [] Bed alarm activated    COMMUNICATION/EDUCATION:   Patient Education  Education Provided: Role of Therapy;Plan of Care;ADL Adaptive Strategies  Education Method: Verbal;Teach Back  Barriers to Learning: None  Education Outcome: Verbalized understanding    Thank you for this referral.  Benoit Umana, OTR/L  Minutes: 11    Eval Complexity: [facility] AM-PAC® Daily Activity Inpatient Short Form (6-Clicks)*    How much HELP from another person does the patient currently need    (If the patient hasn't done an activity recently, how much help from another person do you think he/she would need if he/she tried?)   Total (Total A or Dep)   A Lot  (Mod to Max A)   A Little (Sup or Min A)   None (Mod I to I)   Putting on and taking off regular lower body clothing? [] 1 [] 2 [x] 3 [] 4   2. Bathing (including washing, rinsing,      drying)? [] 1 [] 2 [] 3 [x] 4   3. Toileting, which includes using toilet, bedpan or urinal?   [] 1 [] 2 [] 3 [x] 4   4. Putting on and taking off regular upper body clothing? [] 1 [] 2 [] 3 [x] 4   5. Taking care of personal grooming such as brushing teeth? [] 1 [] 2 [] 3 [x] 4   6. Eating meals? [] 1 [] 2 [] 3 [x] 4       Current research shows that an AM-PAC score of 18 or greater is associated with a discharge to the patient's home setting.   Based on an AM-PAC score of 23/24 and their current ADL deficits; it is recommended that the patient have 2-3 sessions per week of Occupational Therapy at d/c to increase the patient's independence.

## 2023-02-22 NOTE — HOME CARE
Received home health referral for [facility] for SN,PT,[place] Knee protocol . Discharge order noted for today; spoke to patient in room, Verified demographics,explained [facility] services and answered all questions and provided patient with [facility] contact card, patient states he will be staying at his 's (Ashlee Morrison) home at [address] , [address] ;  Patient states he has DME: RW and shower chair ; [facility] referral processed to [facility] central Intake and scheduling . RADHA POWELL LPN.

## 2023-02-22 NOTE — FLOWSHEET NOTE
Received report from Alondra Florence. Pt AAOx3, NAD, breathing non labored, on room air, HOB up. IV site clean, dry and intact. IVF going per order. Dressing left knee in place. Ice pack on. Bed at the lowest level on lock position, call bell w/i reach. Ambulated w/ pt in the hallway.

## 2023-02-22 NOTE — PROGRESS NOTES
Discharge/ Transition Planning        Patient accepted to [facility].  Placed home health in [address]  RN BSN  /Discharge Planner

## 2023-02-22 NOTE — PROGRESS NOTES
Discharge teaching completed at bedside with patient. Opportunity provided for clarifying questions. No questions. IV removed. ID removed and shredded.

## 2023-02-22 NOTE — PROGRESS NOTES
1730- Patient arrived to  unit from PACU. Oriented to  room  Vital signs taken. IV fluids infusing, SCDS placed on patient. Patient eating dinner, family  at bedside. Call bell within reach. Will continue to  monitor.

## 2023-02-22 NOTE — PROGRESS NOTES
PHYSICAL THERAPY EVALUATION/DISCHARGE    Patient: Gemma Grant (17 y.o. male)  Date: 2/22/2023  Primary Diagnosis: Post-traumatic arthritis of lower leg, left [M17.32]  Post-traumatic osteoarthritis of left knee [M17.32]  S/P total knee arthroplasty, left [Z96.652]  Procedure(s) (LRB):  LEFT KNEE RETAINED HARDWARE REMOVAL/LEFT TOTAL KNEE ARTHROPLASTY/MEDACTA/2 SA'S/ADDUCTOR CANAL BLOCK; 23HR (Left) 1 Day Post-Op   Precautions: Weight Bearing,  , Left Lower Extremity Weight Bearing: Weight Bearing As Tolerated,  ,  ,  ,  ,    PLOF: Pt Duc with SPC, lives in [address] [place] [address] with 6 FARHEEN and R handrail. Pt has ~10 other men living in the house with him with mattress on floor. ASSESSMENT AND RECOMMENDATIONS:  Patient is 47 yo male admitted to hospital for TKA and presents today supine in bed and agreeable to therapy. Patient was educated on weight bearing status, role of therapy, TE (see below), and equipment in room including role of SCDs, towel roll, and ice sleeve. Patient demonstrated good SLR and transferred to sitting EOB for objective assessment with Duc. Patient was given demo with instruction on sit <> stand transfer and gait training. Patient transferred to standing with SBA and ambulated x200 feet with slow gait speed and RW. Pt ascending/descending 8 steps with step to gait pattern and CGA-Radha for contralateral handhold assist. At conclusion of session patient transferred to sitting in recliner and was left resting with call bell by the side and towel roll under ankle. Patient instructed to call for assistance if they needed to get up for any reason and denied need for further assistance. Patient demonstrates appropriate mobility for discharge home with supervision, assist PRN, RW and HH. Patient does not require further skilled physical therapy intervention at this level of care.     Further Equipment Recommendations for Discharge: bedside commode and rolling walker    Discharge Recommendations: Home with Home health PT;Home with assist PRN    AMPAC: Current research shows that an AM-PAC score of 18 or greater is associated with a discharge to the patient's home setting. Based on an AM-PAC score of 20/24 and their current functional mobility deficits, it is recommended that the patient have 2-3 sessions per week of Physical Therapy at d/c to increase the patient's independence. This AMPAC score should be considered in conjunction with interdisciplinary team recommendations to determine the most appropriate discharge setting. Patient's social support, diagnosis, medical stability, and prior level of function should also be taken into consideration. SUBJECTIVE:   Patient stated I am following my calling.     OBJECTIVE DATA SUMMARY:   History reviewed. No pertinent past medical history.   Past Surgical History:   Procedure Laterality Date    HERNIA REPAIR      KNEE SURGERY Left        Home Situation:  Social/Functional History  Lives With: Other (comment) (ministry)  Type of Home: House  Home Layout: One level  Home Access: Stairs to enter with rails (R handrail)  Entrance Stairs - Number of Steps: 6  Critical Behavior:  Orientation Level: Oriented X4      Strength:    Strength: Generally decreased, functional    Tone & Sensation:   Tone: Normal  Sensation: Intact    Range Of Motion:  AROM: Generally decreased, functional (L knee)    Functional Mobility:  Bed Mobility:     Bed Mobility Training  Bed Mobility Training: Yes  Supine to Sit: Modified independent  Sit to Supine: Modified independent  Scooting: Modified independent  Transfers:     Transfer Training  Transfer Training: Yes  Sit to Stand: Supervision  Stand to Sit: Supervision  Balance:        Balance  Sitting: Intact  Standing: Intact (with RW)    Ambulation/Gait Training:    Gait  Overall Level of Assistance: Supervision  Base of Support: Shift to right  Speed/Jacinta: Shuffled  Step Length: Left shortened;Right shortened  Stance: Left decreased  Gait Abnormalities: Decreased step clearance; Antalgic  Distance (ft): 200 Feet  Assistive Device: Walker, rolling  Rail Use: Right  Stairs - Level of Assistance: Contact-guard assistance  Number of Stairs Trained: 8    Pain:  Pain level pre-treatment: 0/10   Pain level post-treatment: 0/10     Activity Tolerance:   Activity Tolerance: Patient tolerated evaluation without incident  Please refer to the flowsheet for vital signs taken during this treatment. After treatment:   [x]         Patient left in no apparent distress sitting up in chair  []         Patient left in no apparent distress in bed  [x]         Call bell left within reach  [x]         Nursing notified  []         Caregiver present  []         Bed alarm activated  []         SCDs applied    COMMUNICATION/EDUCATION:   Patient Education  Education Given To: Patient  Education Provided: Role of Therapy;Plan of Care;Energy Conservation;Home Exercise Program;Transfer Training;Family Education; Fall Prevention Strategies  Education Method: Verbal  Barriers to Learning: None  Education Outcome: Verbalized understanding    Thank you for this referral.  Rik Ramirez, PT  Minutes: 26      Eval Complexity: Decision [facility] [place] AM-PAC® Basic Mobility Inpatient Short Form (6-Clicks) Version 2    How much HELP from another person does the patient currently need    (If the patient hasn't done an activity recently, how much help from another person do you think he/she would need if he/she tried?)   Total (Total A or Dep)   A Lot  (Mod to Max A)   A Little (Sup or Min A)   None (Mod I to I)   Turning from your back to your side while in a flat bed without using bedrails? [] 1 [] 2 [] 3 [x] 4   2. Moving from lying on your back to sitting on the side of a flat bed without using bedrails? [] 1 [] 2 [] 3 [x] 4   3. Moving to and from a bed to a chair (including a wheelchair)?    [] 1 [] 2 [x] 3 [] 4   4. Standing up from a chair using your arms (e.g., wheelchair, or bedside chair)? [] 1 [] 2 [x] 3 [] 4   5. Walking in hospital room? [] 1 [] 2 [x] 3 [] 4   6. Climbing 3-5 steps with a railing?+   [] 1 [] 2 [x] 3 [] 4   +If stair climbing cannot be assessed, skip item #6. Sum responses from items 1-5.

## 2023-02-23 ENCOUNTER — HOME CARE VISIT (OUTPATIENT)
Age: 53
End: 2023-02-23
Payer: COMMERCIAL

## 2023-02-23 VITALS
TEMPERATURE: 97.4 F | SYSTOLIC BLOOD PRESSURE: 102 MMHG | DIASTOLIC BLOOD PRESSURE: 62 MMHG | RESPIRATION RATE: 16 BRPM | OXYGEN SATURATION: 97 % | HEART RATE: 93 BPM

## 2023-02-23 VITALS
HEART RATE: 76 BPM | DIASTOLIC BLOOD PRESSURE: 82 MMHG | OXYGEN SATURATION: 98 % | RESPIRATION RATE: 18 BRPM | SYSTOLIC BLOOD PRESSURE: 140 MMHG | TEMPERATURE: 98 F

## 2023-02-23 PROCEDURE — G0151 HHCP-SERV OF PT,EA 15 MIN: HCPCS

## 2023-02-23 PROCEDURE — G0299 HHS/HOSPICE OF RN EA 15 MIN: HCPCS

## 2023-02-23 ASSESSMENT — ENCOUNTER SYMPTOMS
DYSPNEA ACTIVITY LEVEL: AFTER AMBULATING MORE THAN 20 FT
PAIN LOCATION - PAIN QUALITY: ACHE

## 2023-02-23 NOTE — Clinical Note
Therapy Functional Score Assessment  Question   Score   Grooming           2     Upper Dressing 2    Lower Dressing 3      Bathing  5     Toilet Transfer 1     Transfer  2        Ambulation 3   Dyspnea                     1       Pain Interfering with activity  4  Est number therapy visits      10

## 2023-02-23 NOTE — Clinical Note
Dear Krystina Kennedy ,     This message is to inform you that your patient,  TAVO Willis, 70, has been evaluated by [facility] services under Home PT today. It was determined that pt will benefit from Home PT for  3w1 5w1, 2w1 to improve strength and endurance, gait, balance, safety on stairs, and return to PLOF. Any questions please contact me at [phone].     Sincerely,     Kike Pizano, PT

## 2023-02-23 NOTE — Clinical Note
Ted Willson will be seeing this pt for treatment. Admitted with L TKR. Pt lives with Amber Kapadia in home with several others from [facility]. When I was present only the Amber Kapadia was there. Very nice man. You will need to bang hard on the outside door to the home and if he doesn't hear it then call him. He has had a hard time getting his pain under control. Amber Kapadia keeps his Tramadol and Oxycodone for night and he did pick him up Tylenol yesterday . Please focus on:   1.supine,  seated and standing (when tolerating)  HEP  2. Bed mob   3. safety with transfers  4. gait training in home progress to least AD - needed some cueing on proper gait technique and not to circumduct and walk \"stiff legged\"   5  up and down stairs to enter home  6.  ROM L knee   7.  return to PLOF     Any questions give me a call  Analilia Irwin

## 2023-02-23 NOTE — Clinical Note
Patient admitted to [facility] today for s/p LEFT KNEE RETAINED HARDWARE REMOVAL/LEFT TOTAL KNEE ARTHROPLASTY. Anticipated SN freq: 1w3.  Pt for eval and treat

## 2023-02-23 NOTE — HOME HEALTH
Skilled services/Home bound verification:     Skilled Reason for admission/summary of clinical condition: s/p LEFT KNEE RETAINED HARDWARE REMOVAL/LEFT TOTAL KNEE ARTHROPLASTY  This patient is homebound for the following reasons Requires considerable and taxing effort to leave the home . Caregiver: friend. Caregiver assists ADL's, medications, meals, transportation, errands. .    Medications reconciled and all medications are available in the home this visit. The following education was provided regarding medications: Instructed on medication Tylenol to manage mild pain or fever. In addition, warned of possible S/E such as hemolytuc anemia, neutropenia, leukopenia, pancytopenia, liver damage, jaundice, hypoglycemia, rash and urticaria. Warned that high doses or unsupervised long-term use can cause hepatic damage. Excessive ingestion of alcohol may increase the risk of hepatotoxicity. Drug may be used safely if therapy is short-term and does not exceed recommended doses  Medications  are effective at this time. High risk medication teaching regarding anticoagulants, hyperglycemic agents or opiod narcotics performed (specify) Skilled nurse instructed patient about medication Oxycodone is used to treat moderate to severe pain. Take this medicine exactly as prescribed by your doctor. Never share the medicine with another person. Misuse narcotic pain medication can use addiction overdose, or death, especially in a child or other person using the medicine without a prescription. Common Oxycodone side effects may include: mild drowsiness, headache, dizziness, tired feeling; stomach pain, nausea, vomiting, constipation, loss of appetite, dry mouth, or mild itching. notified of any discrepancies/look a like medications/medication interactions na. Home health supplies by type and quantity ordered/delivered this visit include: ordered    Patient education provided this visit to include:  Instructed SO to change patient's position slowly when transferring from sitting or lying positions to standing positions. This will avoid dizziness and falls due to a sudden decrease of blood pressure  Patient level of understanding of education provided: patient/caregiver verbalized 100% understanding. Jared Education Provided: na    Patient response to procedure performed:  patient denied pain and discomfort during procedure/visi    Home exercise program/Homework provided: Sn instructed patient on pursed lip breathing. Pursed lip breathing is one of the simplest ways to control shortness of breath. It provides a quick and easy way to slow your pace of breathing, making each breath more effective. Pursed lip breathing: Improves ventilation, releases trapped air in the lungs, keeps the airways open longer and decreases the work of breathing, prolongs exhalation to slow the breathing rate, improves breathing patterns by moving old air out of the lungs and allowing for new air to enter the lungs, relieves shortness of breath, causes general relaxation. When should I use this technique? Use this technique during the difficult part of any activity, such as bending, lifting or stair climbing. Practice this technique 4 - 5 times a day at first so you can get the correct breathing pattern. Pursed lip breathing technique: Relax your neck and shoulder muscles, breathe in ( inhale ) slowly through your nose for two counts, keeping your mouth closed. Don't take a deep breath; a normal breath will do. It may help to count to yourself: inhale, one, two. Pucker or purse your lips as if you were going to whistle or gently flicker the flame of a candle. Breathe out ( exhale ) slowly and gently through your pursed lips while counting to four. It may help to count to yourself: exhale, one, two, three, four. Pt/Caregiver instructed on plan of care and are agreeable to plan of care at this time.        Imelda RING notified of patient admission to home health and plan of care including anticipated frequency of 1w3 and treatments/interventions/modalities of pt. Discharge planning discussed with patient and caregiver. Discharge planning as follows: when goals met, when wounds healed, pain controlled and family independent with medications. Chung Otero Pt/Caregiver did verbalize understanding of discharge planning. Next MD appointment  [address] [place] on 3/8/23  Patient/caregiver encouraged/instructed to keep appointment as lack of follow through with physician appointment could result in discontinuation of home care services for non-compliance.

## 2023-02-23 NOTE — HOME HEALTH
Evaluation Summary: Siva Eldridge  is a 48year old male being admitted to home health for PT/SN services for s/p L TKR by Dr. Chepe Barrett on 2/21/23. Patient participated in goal setting and care planning and are agreeable to the care plan. Discharge planning/training was initiated to maximize overall function and return to PLOF. PMHx: see chart for PMH  SUBJECTIVE:  \"I am in a lot of pain, I didn't think it would be this bad. I just took some Tylenol a little bit before you came. \"   Pt reports 5/10 and increased to 10/10 over the past 24 hours. Pt denies falls since being home. PT explained to pt that the nerve block may have worn off and to make sure he stays on top of his pain meds. Pt's Mason Chaves went out and got him Tylenol prior to PT arriving and gave him some of that, and pt only taking Tramadol and Oxycodone at night. REQUIRES CAREGIVER ASSISTANCE FOR:   and \" family \" at group home assists with meals, ADLs as needed,  transportation, medications, ambulation/stairs  PLOF: Pt lives in group home with pastors in 2 level home and resides on first floor with steps to enter. Pt was indep with cane, indep with ADLs and  would give medication and transportation. Pt can cook and manage home tasks but group home and  assist with this prior. DME: FWW, bath chair  MEDICATIONS REVIEWED AND UPDATED: Medications reconciled and all medications are available in the home this visit. The following education was provided regarding high risk medications (Tramadol and Oxycodone as pain meds and to take as directed and monitor for BMs ), medication interactions, and look a like medications: Continue medication as prescribed by MD. Medications are effective at this time. NEXT MD APPT:  with Dr. Chepe Barrett 3/8/23 @ 9:30 am   ROM: PROM of L knee -19 ext, and AROM in sitting   STRENGTH:  see strength section for details. WOUNDS:  ace wrap removed from L knee and honeycomb dressing in place.    Dressing change to be done on POD #7 2/28/23  by SN.   BED MOBILITY: sup <> sit with SBA and use of UE to get his LLE onto bed. TRANSFERS: sit to stand from bed, chair and couch with SBA but cues for proper hand placement for safety and proper technique and increased effort and time. GAIT: pt ambulated 100' with FWW and SBA with cues for proper heel to toe gait pattern on L LE,  step through gait pattern, and to have feet pass the bar in front to prevent falls. Pt circumducting LLE with swing phase and cued to relax and flex knee when swinging through, and to keep head up when walking for upright posture. STAIRS: NT  BALANCE: Fair + with FWW. PATIENT EDUCATION PROVIDED THIS VISIT: safety for transfers and proper hand placement,  HEP per handout, walking with heel to toe gait pattern, and step through gait pattern, deep breathing/PLB, ice pack with barrier between L knee and skin for 20 min on and 40 min off,  clearing objects and clear pathways for fall prevention as well as well lit areas at nighttime, signs and symptoms of infection. HEP consisting of:  1. Walking every 30 min to 1 hour during the day with FWW   2. TKR protocol per handout given to pt for supine and sitting exercises. Written HEP issued, patient/caregiver verbalized understanding. Patient Level of understanding of education provided: pt able to return demo HEP for supine and sitting exercise with assistance and cues for proper technique. Patient response to treatment: Pt reported increase in pain throughout treatment with exercises and movement but did say he has some relief after session.      CONTINUED NEED FOR THE FOLLOWING SKILLS: HH PT is medically necessary to  address Lknee  pain, decreased ROM of L knee,  decreased strength and endurance, increased swelling, decreased independence with functional transfers, impaired gait, impaired stair negotiation, and impaired balance in order to improve functional independence, quality of life, return to PLOF, and reduce the risk for falls.   PLAN: 3w1, 5w1, 2w1  DISCHARGE PLANNING DISCUSSED: Discharge to self and family under MD supervision once all goals have been met or patient has reached max potential.

## 2023-02-23 NOTE — Clinical Note
thank you!  ----- Message -----  From: [address]ebbles [address], PT  Sent: 2/23/2023   6:49 PM EST  To: Mariann Zazueta RN      Therapy Functional Score Assessment  Question   Score   Grooming           2     Upper Dressing 2    Lower Dressing 3      Bathing  5     Toilet Transfer 1     Transfer  2        Ambulation 3   Dyspnea                     1       Pain Interfering with activity  4  Est number therapy visits      10

## 2023-02-24 ENCOUNTER — FOLLOWUP TELEPHONE ENCOUNTER (OUTPATIENT)
Facility: HOSPITAL | Age: 53
End: 2023-02-24

## 2023-02-24 ENCOUNTER — HOME CARE VISIT (OUTPATIENT)
Age: 53
End: 2023-02-24
Payer: COMMERCIAL

## 2023-02-24 VITALS
DIASTOLIC BLOOD PRESSURE: 70 MMHG | TEMPERATURE: 97.5 F | HEART RATE: 85 BPM | SYSTOLIC BLOOD PRESSURE: 110 MMHG | OXYGEN SATURATION: 99 %

## 2023-02-24 PROCEDURE — G0157 HHC PT ASSISTANT EA 15: HCPCS

## 2023-02-24 ASSESSMENT — ENCOUNTER SYMPTOMS: PAIN LOCATION - PAIN QUALITY: SORE, ACHE

## 2023-02-24 NOTE — TELEPHONE ENCOUNTER
Call placed to patient, ID verified x 2. Patient is s/p left total knee replacement with Dr. Gretchen Stapleton, [facility] 02/21/2023. He denies chest pain, shortness of breath, nausea, vomiting, fever, chills or calf pain. He states that pain is well controlled taking pain medication only as needed. He is ambulating ad lavern with his rolling walker. His dressing is described as clean, dry and intact. He denies any numbness or tingling to his left lower extremity. He denies difficulty with bowel or bladder. Per patient home health and home physical therapy have been out to see him. Overall he feels he is doing very well. He has no questions or concerns at this time . He will follow up with Dr. Gretchen Stapleton in two weeks or sooner if needed.

## 2023-02-24 NOTE — HOME HEALTH
SUBJECTIVE: The pt states his Sandy Gonzales is managing the pain medication and that he is only taking the medications at night and takes tylenol during the daytime. The pt able to answer the door in a timely manner with the use of the RW on this date. Juan Nicholson PAIN: see pain assessment  OBJECTIVE: see interventions  . NEXT MD APPT: 3/8/23  . CAREGIVER ASSISTANCE NEEDED FOR: The pt lives in a [address] with multiple other members. He requires assistance for shopping, transportation, meals, medication management. .  ASSESSMENT AND PROGRESS TOWARD GOALS:  The pt presents with decreased ROM and strength of the LLE from a recent LTKA. He reports increased pain and has increased edema mostly present at the knee joint itself. He requires the use of the RW and increased CG support at this time due to limited mobility and will benefit from continued HHPT to address pain, ROM, strength, balance, gait and transfers to improve his safety with functional mobility allowing him to return to (I) with an improved quality of life. He demonstrated much difficulty with follow through with heel/toe and step through pattern with gait training and will benefit from continued gait training to improve his gait mechanics. PATIENT RESPONSE TO TREATMENT: 4/10 pain level post activity. PATIENT LEVEL OF UNDERSTANDING OF EDUCATION: Good - the pt able to teach back the HEP with the use of the handouts. .  CONTINUED NEED FOR THE FOLLOWING SKILLS: HH PT is medically necessary to address pain, decreased ROM, decreased strength, increased swelling, impaired bed mobility, decreased independence with functional transfers, impaired gait, impaired stair negotiation, and impaired balance in order to improve functional independence, quality of life, return to PLOF, reduce the risk for falls, and reduce pain. Jennifer LAST COMPLETED ON:  The tps status and POC discussed with Savanna Gomez, PT 2/24/23. Juan Nicholson   PLAN: Gait training, Plan to progress the LE strengthening and ROM program to improve mobility and gait.   DISCHARGE PLANNING DISCUSSED: Discharge to self and family under MD supervision once all goals have been met or patient has reached maximum potential. Discussed with the pt the POC to continue HHPT with the frequency 3w1, 5w1, 2w1. He is in agreement to the POC at this time.

## 2023-02-25 ENCOUNTER — HOME CARE VISIT (OUTPATIENT)
Age: 53
End: 2023-02-25
Payer: COMMERCIAL

## 2023-02-25 PROCEDURE — G0157 HHC PT ASSISTANT EA 15: HCPCS

## 2023-02-26 ENCOUNTER — HOME CARE VISIT (OUTPATIENT)
Age: 53
End: 2023-02-26
Payer: COMMERCIAL

## 2023-02-26 PROCEDURE — G0157 HHC PT ASSISTANT EA 15: HCPCS

## 2023-02-27 ENCOUNTER — HOME CARE VISIT (OUTPATIENT)
Age: 53
End: 2023-02-27
Payer: COMMERCIAL

## 2023-02-27 VITALS
TEMPERATURE: 98.9 F | HEART RATE: 73 BPM | DIASTOLIC BLOOD PRESSURE: 70 MMHG | DIASTOLIC BLOOD PRESSURE: 74 MMHG | SYSTOLIC BLOOD PRESSURE: 122 MMHG | RESPIRATION RATE: 15 BRPM | OXYGEN SATURATION: 98 % | RESPIRATION RATE: 14 BRPM | OXYGEN SATURATION: 97 % | SYSTOLIC BLOOD PRESSURE: 112 MMHG | TEMPERATURE: 98.8 F | HEART RATE: 78 BPM

## 2023-02-27 VITALS
OXYGEN SATURATION: 99 % | SYSTOLIC BLOOD PRESSURE: 128 MMHG | TEMPERATURE: 97.9 F | HEART RATE: 80 BPM | DIASTOLIC BLOOD PRESSURE: 86 MMHG

## 2023-02-27 PROCEDURE — G0157 HHC PT ASSISTANT EA 15: HCPCS

## 2023-02-27 ASSESSMENT — ENCOUNTER SYMPTOMS
PAIN LOCATION - PAIN QUALITY: ACHE, SORE
PAIN LOCATION - PAIN QUALITY: SORENESS, TIGHTNESS

## 2023-02-27 NOTE — HOME HEALTH
SUBJECTIVE: The pt answered the door with the use of the RW in a timely manner. Carly Mcmillan PAIN: see pain assessment    OBJECTIVE: see interventions  . NEXT MD APPT: 3/8/23  . CAREGIVER ASSISTANCE NEEDED FOR: The pt lives in a [facility] [address]. with multiple other members. He requires assistance for shopping, transportation, meals, medication management. .  ASSESSMENT AND PROGRESS TOWARD GOALS:  The pt demonstrated a positive result in HHPT on this date as noted with improved step through pattern during ambulation with improved fluency of all mvmts noted. His ROM is now lacking 3 degrees end range knee ext and able to achieve 116 degrees L knee flex in supine, actively. He continues to take the pain medication at night and use of ice and tylenol throughout the day. He was able to ascend/descend the stairs for exiting and entering the home with the folded RW and S on this date as well as ambulate on outside surfaces mod(I) on straight paths and S during turning. He will continue to benefit from HHPT for continued progression towards safe functional mobility allowing him to safely transition to community activities. PATIENT RESPONSE TO TREATMENT: 4/10 pain level post activity. PATIENT LEVEL OF UNDERSTANDING OF EDUCATION: Good - the pt able to teach back the HEP with the use of the handouts. Carly Mcmillan PLAN: Supervisory visit with Amandeep Garcia PT next visit. DISCHARGE PLANNING DISCUSSED: Discharge to self and family under MD supervision once all goals have been met or patient has reached maximum potential. Discussed with the pt the POC to continue HHPT with the frequency for 4 more visits this week then 2w1. He is in agreement to the POC at this time.

## 2023-02-28 ENCOUNTER — HOME CARE VISIT (OUTPATIENT)
Age: 53
End: 2023-02-28
Payer: COMMERCIAL

## 2023-02-28 VITALS
TEMPERATURE: 98 F | DIASTOLIC BLOOD PRESSURE: 82 MMHG | SYSTOLIC BLOOD PRESSURE: 140 MMHG | RESPIRATION RATE: 14 BRPM | HEART RATE: 89 BPM | OXYGEN SATURATION: 97 %

## 2023-02-28 VITALS
OXYGEN SATURATION: 99 % | HEART RATE: 68 BPM | DIASTOLIC BLOOD PRESSURE: 80 MMHG | RESPIRATION RATE: 17 BRPM | SYSTOLIC BLOOD PRESSURE: 120 MMHG | TEMPERATURE: 97.5 F

## 2023-02-28 PROCEDURE — G0151 HHCP-SERV OF PT,EA 15 MIN: HCPCS

## 2023-02-28 PROCEDURE — G0299 HHS/HOSPICE OF RN EA 15 MIN: HCPCS

## 2023-02-28 ASSESSMENT — ENCOUNTER SYMPTOMS: PAIN LOCATION - PAIN QUALITY: ACHY

## 2023-02-28 NOTE — HOME HEALTH
Subjective: Patient relays that he has been able to complete his given HEP as well as hourly ambulation as instructed. He reports that his  caregiver helps him to manage his medications. Objective: Skilled home health physical therapy interventions completed today listed in care plan section. Interventions performed today to assist in return to prior level of function, address deficits as apparent upon time of initial evaluation, return to community and personal activities, and progress further towards goals as previously established in plan of care. There are not any changes to medications upon timing of this visit. Home health supplies are not ordered/delivered today. Visual inspection finds dressing intact with moderate drainage noted into dressing. Assessment:  Patient is progressing towards goals as previously established in POC with skilled home health physical therapy services at this time as made apparent by ability to complete shower transfer today. No suspected post surgical infection at this time. Patient displayed better stride length after cueing/training for corrections. Family/caregiver  and friends involvement is present/set-up at this point in time and assists with meals, transport, general encouragement. Plan:  Discharge planning discussed at this visit regarding eventual discharge once patient has met goals and/or met maximum benefit from home health skilled PT services with patient understanding and agreeable at this time. Continued need for skilled home health PT at this time to address deficits, reduce risk of falls, and obtain goals as previously established per plan of care. Further gait training possibly outside if weather permits.

## 2023-02-28 NOTE — HOME HEALTH
Subjective: Patient states that he was able to complete his given exercises again since last visit. He reports that he is looking forward to ambulating outside today. Objective: Skilled home health physical therapy interventions completed today listed in care plan section. Interventions performed today to assist in return to prior level of function, address deficits as apparent upon time of initial evaluation, return to community and personal activities, and progress further towards goals as previously established in plan of care. There are not any changes to medications upon timing of this visit. Home health supplies are not ordered/delivered today. Visual inspection finds dressing intact with moderate drainage noted into dressing. Assessment:  Patient is progressing towards goals as previously established in [address] with skilled home health physical therapy services at this time as made apparent by ability to ambulate outside today with FWW for support. No suspected post surgical infection at this time. Patient displayed good safety awareness ambulating outside home today. Was able to effectively navigate the steps outside of home with folded walker for support today after training for appropriate form and sequencing. Family/caregiver  and friends involvement is present/set-up at this point in time and assists with meals, transport, general encouragement. Plan:  Discharge planning discussed at this visit regarding eventual discharge once patient has met goals and/or met maximum benefit from home health skilled PT services with patient understanding and agreeable at this time. Continued need for skilled home health PT at this time to address deficits, reduce risk of falls, and obtain goals as previously established per plan of care. Continue with outside ambulation and stair training as weather permits.

## 2023-02-28 NOTE — HOME HEALTH
Skilled reason for visit: skilled ongoing assessement, medication management, education, wound assessment    Caregiver involvement: family assists ADL's, medications, meals, transportation, errands. .    Medications reviewed and all medications are available in the home this visit. The following education was provided regarding medications:  patient knowledgeable about all medications, has no questions or concerns at this time. .    MD notified of any discrepancies/look a-like medications/medication interactions: na  Medications are effective at this time. Home health supplies by type and quantity ordered/delivered this visit include: na    Patient education provided this visit: Skilled nurse instructed patient on safety measures to avoid injuries and falls such as keeping adequate lighting during the day and night and was educated on proper use of assistive device to prevent falls. Intructed in home ambulation with cane, clear pathways, falls precautions, good lighting, non skid shoes, etc.    Sharps education provided: na    Patient level of understanding of education provided: patient/caregiver verbalized 100% understanding. Skilled Care Performed this visit: skilled ongoing assessement, medication management, education, wound assessment    Patient response to procedure performed:  patient denied pain and discomfort during procedure/visit    Agency Progress toward goals: patient steadily progressing towards all goals    Patient's Progress towards personal goals: patient steadily progressing towards all goals    Home exercise program: Sn instructed patient on pursed lip breathing. Pursed lip breathing is one of the simplest ways to control shortness of breath. It provides a quick and easy way to slow your pace of breathing, making each breath more effective. What does pursed lip breathing do?  Pursed lip breathing: Improves ventilation, releases trapped air in the lungs, keeps the airways open longer and decreases the work of breathing, prolongs exhalation to slow the breathing rate, improves breathing patterns by moving old air out of the lungs and allowing for new air to enter the lungs, relieves shortness of breath, causes general relaxation. When should I use this technique? Use this technique during the difficult part of any activity, such as bending, lifting or stair climbing. Practice this technique 4 - 5 times a day at first so you can get the correct breathing pattern. Pursed lip breathing technique: Relax your neck and shoulder muscles, breathe in ( inhale ) slowly through your nose for two counts, keeping your mouth closed. Don't take a deep breath; a normal breath will do. It may help to count to yourself: inhale, one, two. Pucker or purse your lips as if you were going to whistle or gently flicker the flame of a candle. Breathe out ( exhale ) slowly and gently through your pursedlips while counting to four. It may help to count to yourself: exhale, one, two, three, four. Continued need for the following skills: Nursing and Physical Therapy. Plan for next visit: skilled ongoing assessement, medication management, education    Patient and/or caregiver notified and agrees to changes in the Plan of Care: N/A. The following discharge planning was discussed with the pt/caregiver: Discharge planning as follows: when goals met, when wounds healed, pain controlled and family independent with medications.

## 2023-02-28 NOTE — HOME HEALTH
PT REASSESSMENT/SUPERVISORY VISIT-   Elyssa Powell LPTA present for reassessment   SUBJECTIVE:  \"My pain is doing so much better. It is about a 4-5/10. \"   See pain assessment for details of pain. No falls reported. CAREGIVER ASSISTANCE NEEDED FOR: Shayne Herrera and \"family\" in ministry assists with meals, transportation, mobility, medications  MEDICATIONS REVIEWED AND UPDATED: Medications reconciled and all medications are available in the home this visit. The following education was provided regarding medications, medication interactions, and look a like medications: Continue medication as prescribed by MD. Medications are effective at this time. WOUNDS:  L knee incision with Honey comb in place when arrived with small amount of drainage on dressing that appeared to be similiar to [facility]. Dressing change due to today. LPTA performed clean technique to remove dressing. Dry Gauze/abd pad used to collect small amount of active blood drainage from incision. Able to get it to stop and dressing reapplied. Picture of L knee incision taken and in media in chart. ROM:  L knee -5 to 116  PROM to AAROM (extension with LE propped on bin with quad set)   to AROM (flexion in supine)   BED MOBILITY:  indep   TRANSFERS:  see goals section for details  GAIT TRAINING:  see goals section for details  STAIRS: see goals section for details  BALANCE:  Pt has improved balance from fair to fair + /good - and progressed from [facility] to [facility]. Pt will still use FWW for primary ambulation but cont to work on gait and balance with SPC.    PATIENT/CAREGIVER EDUCATION PROVIDED THIS VISIT: fall prevention, dressing changes, new addition of terminal extension exercise with yellow Tband   HEP:  supine, seated, standing exercise per handouts given  Patient level of understanding of education provided: pt able to return demo new T band terminal extension exercise given today in standing  Patient response to treatment:  Pt has reported feeling good after his treatment  and that he can feel the swelling going down. ASSESSMENT AND PROGRESS TOWARD GOALS: Pt has made good progress over the past week with improvement in bed mob with SBA with use of UE to put LLE onto bed and now indep. Pt was SBA for all transfers off bed, chair and couch last week and this week mod indep to indep all surfaces. Pt is mod indep with walking household distances and up to 250' outdoors on uneven surfaces with both FWW and SPC shared for distance. Pt still is showing some decrease ROM of  L knee but also improved since IE. Pt  L knee ROM improved from  -19 (PROM) and -25 (AROM ) of extension to 90 degrees of flexion at IE to today -5 to 116. Pt still with edema and pain , as well as tightness that is limiting pt to increase ROM but will come with time. Pt also instructed on safer training on stairs with SPC today and used FWW yesterday with CGA. Pt pain is more controlled and tolerated and very motivated to get better. CONTINUED NEED FOR THE FOLLOWING SKILLS: HH PT is medically necessary to address Lknee pain, decreased L knee ROM, decreased strength and endurance, increased swelling,  impaired gait, impaired stair negotiation, and impaired balance in order to improve functional independence, quality of life, return to PLOF, reduce the risk for falls, and reduce pain.   PLAN: To cont PT for 2 more visits this week and next week, then DC after Ortho appt on 3/8/23 to Outpt PT.   DISCHARGE PLANNING DISCUSSED: Discharge to self and family under MD supervision once all

## 2023-03-01 ENCOUNTER — HOME CARE VISIT (OUTPATIENT)
Age: 53
End: 2023-03-01
Payer: COMMERCIAL

## 2023-03-01 VITALS
HEART RATE: 66 BPM | DIASTOLIC BLOOD PRESSURE: 84 MMHG | SYSTOLIC BLOOD PRESSURE: 122 MMHG | TEMPERATURE: 97.5 F | OXYGEN SATURATION: 99 %

## 2023-03-01 PROCEDURE — G0157 HHC PT ASSISTANT EA 15: HCPCS

## 2023-03-01 ASSESSMENT — ENCOUNTER SYMPTOMS: PAIN LOCATION - PAIN QUALITY: SORE, ACHE

## 2023-03-01 NOTE — HOME HEALTH
SUBJECTIVE: The pt answered the door with the use of SPC within a timely manner on this date. Nilsa Donovan PAIN: see pain assessment  OBJECTIVE: see interventions  . NEXT MD APPT: 3/9/23   . CAREGIVER ASSISTANCE NEEDED FOR: The pt lives in a [facility]. with multiple other members. He requires assistance for shopping, transportation, meals, medication management. .  ASSESSMENT AND PROGRESS TOWARD GOALS:  The pt continues to lack end range knee ext, but progressing overall as he was able to achieve 120 degrees L knee flex in supine on this date. He is only taking pain medication at night and now ambulating with the [facility] on inside surfaces. His edema and pain management are improving at this time. He will benefit from continued HHPT to achieve safety with all functional mobility for the transition to community activities. Nilsa Donovan PATIENT RESPONSE TO TREATMENT: 4/10 pain level pre PT, 7/10 pain level post PT. Nilsa Donovan HEP - use of ice to the L knee with a barrier between the ice and skin x 20 min durations 5x/day to assist with pain and edema management. standing: (with UE support for balance): heel/toe raises, R/L hip abd, R/L HS curls, marching, mini squats x 20  standing calf stretching x 30 secs holds x 3  standing QS against green tband x 20  seated: LAQ, hip flex x 20  supine: QS, SLR, heel slides x 20    PATIENT LEVEL OF UNDERSTANDING OF EDUCATION: Good - the pt able to teach back the HEP with the use of the handouts. Nilsa Donovan PLAN: plan to readdress outside ambulation and stairs with the [facility] again next visit. DISCHARGE PLANNING DISCUSSED: Discharge to self and family under MD supervision once all goals have been met or patient has reached maximum potential. Discussed with the pt the POC to continue HHPT with the frequency for 1 more visit this week then 2w1. He is in agreement to the POC at this time.

## 2023-03-03 ENCOUNTER — HOME CARE VISIT (OUTPATIENT)
Age: 53
End: 2023-03-03
Payer: COMMERCIAL

## 2023-03-03 VITALS
HEART RATE: 76 BPM | SYSTOLIC BLOOD PRESSURE: 114 MMHG | DIASTOLIC BLOOD PRESSURE: 70 MMHG | OXYGEN SATURATION: 99 % | TEMPERATURE: 97.7 F

## 2023-03-03 PROCEDURE — G0157 HHC PT ASSISTANT EA 15: HCPCS

## 2023-03-03 ASSESSMENT — ENCOUNTER SYMPTOMS: PAIN LOCATION - PAIN QUALITY: SORE

## 2023-03-03 NOTE — HOME HEALTH
SUBJECTIVE: The pt states he is no longer taking the narcotics  . PAIN: see pain assessment    OBJECTIVE: see interventions  . NEXT MD APPT: 3/9/23 for post surgical f/u appt  . CAREGIVER ASSISTANCE NEEDED FOR: The pt lives in a [facility]. with multiple other members. He requires assistance for shopping, transportation, meals, medication management. .  ASSESSMENT AND PROGRESS TOWARD GOALS:  The pt is progressing nicely towards HHPT goals at this time as his ROM is lacking 3 degrees L knee ext and able to achieve 120 degrees active L knee flex in supine. The pt is now mod(I) with transfers from all surfaces. He will benefit from 2 more HHPT visits to ensure (I) with the HEP and educate on safety with all mobility with the [facility] in anticipation of his transition to outpatient PT and increasing community activities. Erica Joyce PATIENT RESPONSE TO TREATMENT: reports of increased soreness with ther exs. Erica Joyce HEP - use of ice to the L knee with a barrier between the ice and skin x 20 min durations 5x/day to assist with pain and edema management. low load long duration ext stretching, timed to tolerance  standing: (with UE support for balance): heel/toe raises, R/L hip abd, R/L HS curls, marching, mini squats x 20  standing calf stretching x 30 secs holds x 3  standing QS against green tband x 20  seated: LAQ, hip flex x 20  supine: QS, SLR, heel slides x 20    PATIENT LEVEL OF UNDERSTANDING OF EDUCATION: Good - the pt able to teach back the HEP with the use of the handouts. Erica Joyce PLAN:plan to adress outside ambulation and address any remaining pt concerns next visit in anticipation of HHPT on the second visit of next week. DISCHARGE PLANNING DISCUSSED: Discharge to self and family under MD supervision once all goals have been met or patient has reached maximum potential. Discussed with the pt the POC to continue HHPT  2w1. He is in agreement to the POC at this time.

## 2023-03-06 ENCOUNTER — HOME CARE VISIT (OUTPATIENT)
Age: 53
End: 2023-03-06
Payer: COMMERCIAL

## 2023-03-06 VITALS
OXYGEN SATURATION: 99 % | SYSTOLIC BLOOD PRESSURE: 110 MMHG | DIASTOLIC BLOOD PRESSURE: 86 MMHG | HEART RATE: 65 BPM | TEMPERATURE: 97.5 F

## 2023-03-06 PROCEDURE — G0157 HHC PT ASSISTANT EA 15: HCPCS

## 2023-03-06 NOTE — CASE COMMUNICATION
He is a DC for you for the next visit this week. His f/u appt is 3/9. The pt has progressed nicely towards the goals for functional mobility with HHPT. He is mod(I) with stairs and ambulation with the SPC on outside surfaces for distances >200 ft. He performs the stairs with step to pattern with slightly side stepping to ensure his entire foot is placed on the stair when descending. His gait pattern is symmetrical stepping pattern wi th full foot clearance, increased WARD with continued difficulty achieving terminal knee ext at LLE heel strike and through stance phase. He is able to achieve 120 degrees L knee flex actively in supine, L knee ext is lacking 8 degrees actively on this date and lacking 5 degrees with passive overpressure. His pain is managed with ice and tylenol at this time. He demonstrates good safety awareness and will be ready to transition to outpat ient PT upon MD referral at his f/u appt. independent

## 2023-03-06 NOTE — HOME HEALTH
SUBJECTIVE: The pt reports going back to [employer] yesterday. Lawernce Roughen PAIN: see pain assessment  OBJECTIVE: see interventions  . NEXT MD APPT: 3/9/23 for post surgical f/u appt  . CAREGIVER ASSISTANCE NEEDED FOR: The pt lives in a [address] with multiple other members. He requires assistance for shopping, transportation, meals, medication management. .  ASSESSMENT AND PROGRESS TOWARD GOALS: The pt has progressed nicely towards the goals for functional mobility with HHPT. He is mod(I) with stairs and ambulation with the SPC on outside surfaces for distances >300 ft. He performs the stairs with step to pattern with slightly side stepping to ensure his entire foot is placed on the stair when descending. His gait pattern is symmetrical stepping pattern with full foot clearance, increased WARD with continued difficulty achieving terminal knee ext at LLE heel strike and through stance phase. He is able to achieve 120 degrees L knee flex actively in supine, L knee ext is lacking 8 degrees actively on this date and lacking 5 degrees with passive overpressure. His pain is managed with ice and tylenol at this time. He demonstrates good safety awareness and will be ready to transition to outpatient PT upon MD referral at his f/u appt. Lawernce Roughen PATIENT RESPONSE TO TREATMENT: The pt is able to teach back the need for the increased focus on low load ext stretching in long sitting to improve the ability to achieve end range knee ext post education on this date. Lawernce Roughen HEP - use of ice to the L knee with a barrier between the ice and skin x 20 min durations 5x/day to assist with pain and edema management.    low load long duration ext stretching, timed to tolerance  standing: (with UE support for balance): heel/toe raises, R/L hip abd, R/L HS curls, marching, mini squats x 20  standing calf stretching x 30 secs holds x 3  standing QS against green tband x 20  seated: LAQ, hip flex x 20  supine: QS, SLR, heel slides x 20    PATIENT LEVEL OF UNDERSTANDING OF EDUCATION: Good - the pt able to teach back the HEP with the use of the handouts. Chhaya Copping PLAN: reassessment with anticipated DC with Claude Mccabe PT next visit. Chhaya Matta DISCHARGE PLANNING DISCUSSED: Discharge to self and family under MD supervision once all goals have been met or patient has reached maximum potential. Discussed with the pt the POC to continue HHPT x 1 visit this week with anticipated DC at that time. He is in agreement to the POC at this time.

## 2023-03-06 NOTE — Clinical Note
Thanks Roshan LEWIS  ----- Message -----  From: Nic SHILPA Acosta  Sent: 3/6/2023   3:12 PM EST  To: Lara Tovar PT      He is a DC for you for the next visit this week. His f/u appt is 3/9. The pt has progressed nicely towards the goals for functional mobility with HHPT. He is mod(I) with stairs and ambulation with the SPC on outside surfaces for distances >200 ft. He performs the stairs with step to pattern with slightly side stepping to ensure his entire foot is placed on the stair when descending. His gait pattern is symmetrical stepping pattern with full foot clearance, increased WARD with continued difficulty achieving terminal knee ext at LLE heel strike and through stance phase. He is able to achieve 120 degrees L knee flex actively in supine, L knee ext is lacking 8 degrees actively on this date and lacking 5 degrees with passive overpressure. His pain is managed with ice and tylenol at this time. He demonstrates good safety awareness and will be ready to transition to outpatient PT upon MD referral at his f/u appt.

## 2023-03-07 ENCOUNTER — HOME CARE VISIT (OUTPATIENT)
Age: 53
End: 2023-03-07
Payer: COMMERCIAL

## 2023-03-07 PROCEDURE — G0300 HHS/HOSPICE OF LPN EA 15 MIN: HCPCS

## 2023-03-09 ENCOUNTER — OFFICE VISIT (OUTPATIENT)
Age: 53
End: 2023-03-09

## 2023-03-09 ENCOUNTER — HOME CARE VISIT (OUTPATIENT)
Age: 53
End: 2023-03-09
Payer: COMMERCIAL

## 2023-03-09 VITALS
RESPIRATION RATE: 16 BRPM | DIASTOLIC BLOOD PRESSURE: 70 MMHG | TEMPERATURE: 97.6 F | OXYGEN SATURATION: 98 % | SYSTOLIC BLOOD PRESSURE: 118 MMHG | HEART RATE: 72 BPM

## 2023-03-09 VITALS
SYSTOLIC BLOOD PRESSURE: 118 MMHG | RESPIRATION RATE: 16 BRPM | OXYGEN SATURATION: 98 % | HEART RATE: 64 BPM | DIASTOLIC BLOOD PRESSURE: 67 MMHG | TEMPERATURE: 97.7 F

## 2023-03-09 DIAGNOSIS — Z48.02 ENCOUNTER FOR STAPLE REMOVAL: ICD-10-CM

## 2023-03-09 DIAGNOSIS — Z96.652 PRESENCE OF LEFT ARTIFICIAL KNEE JOINT: Primary | ICD-10-CM

## 2023-03-09 PROCEDURE — G0151 HHCP-SERV OF PT,EA 15 MIN: HCPCS

## 2023-03-09 PROCEDURE — 99024 POSTOP FOLLOW-UP VISIT: CPT | Performed by: PHYSICIAN ASSISTANT

## 2023-03-09 ASSESSMENT — ENCOUNTER SYMPTOMS
PAIN LOCATION - PAIN QUALITY: ACHY
STOOL DESCRIPTION: FORMED

## 2023-03-09 NOTE — PROGRESS NOTES
Patient: Anselmo Guidry                MRN: [id number]       SSN: [id number]  YOB: 1970        AGE: 48 y.o. SEX: male  There is no height or weight on file to calculate BMI. PCP: SIOBHAN Arias NP  03/09/23      This office note has been dictated. REVIEW OF SYSTEMS:  Constitutional: Negative for fever, chills, weight loss and malaise/fatigue. HENT: Negative. Eyes: Negative. Respiratory: Negative. Cardiovascular: Negative. Gastrointestinal: No bowel incontinence or constipation. Genitourinary: No bladder incontinence or saddle anesthesia. Skin: Negative. Neurological: Negative. Endo/Heme/Allergies: Negative. Psychiatric/Behavioral: Negative. Musculoskeletal: As per HPI above. No past medical history on file.       Current Outpatient Medications:     docusate sodium (COLACE) 100 MG capsule, Take 1 capsule by mouth 2 times daily, Disp: 60 capsule, Rfl: 0    acetaminophen (TYLENOL) 500 MG tablet, Take 2 tablets by mouth in the morning, at noon, and at bedtime, Disp: 120 tablet, Rfl: 1    pantoprazole (PROTONIX) 40 MG tablet, Take 1 tablet by mouth daily, Disp: 60 tablet, Rfl: 5    meloxicam (MOBIC) 15 MG tablet, Take 1 tablet by mouth daily, Disp: 30 tablet, Rfl: 3    aspirin EC 81 MG EC tablet, Take 1 tablet by mouth 2 times daily, Disp: 90 tablet, Rfl: 1    No Known Allergies    Social History     Socioeconomic History    Marital status: Single     Spouse name: Not on file    Number of children: Not on file    Years of education: Not on file    Highest education level: Not on file   Occupational History    Not on file   Tobacco Use    Smoking status: Never    Smokeless tobacco: Never   Vaping Use    Vaping Use: Never used   Substance and Sexual Activity    Alcohol use: Not Currently    Drug use: Never    Sexual activity: Not on file   Other Topics Concern    Not on file   Social History Narrative    Not on file     Social Determinants of Health     Financial Resource Strain: Not on file   Food Insecurity: Not on file   Transportation Needs: Not on file   Physical Activity: Not on file   Stress: Not on file   Social Connections: Not on file   Intimate Partner Violence: Not on file   Housing Stability: Not on file       Past Surgical History:   Procedure Laterality Date    HERNIA REPAIR      KNEE SURGERY Left     TOTAL KNEE ARTHROPLASTY Left 2/21/2023    LEFT KNEE RETAINED HARDWARE REMOVAL/LEFT TOTAL KNEE ARTHROPLASTY/MEDACTA/2 SA'S/ADDUCTOR CANAL BLOCK; 23HR performed by Jaqueline Bruce MD at [address] [facility]         Patient seen evaluated today for his left knee. He is now 16 days status post hardware removal and left knee replacement. He is doing well. He had no troubles the wound. Pain is well controlled. He has had no chest pain or shortness of breath. He has been working with home physical therapy the complications. He is off of the narcotics. Patient denies recent fevers, chills, chest pain, SOB, or injuries. No recent systemic changes noted. A 12-point review of systems is performed today. Pertinent positives are noted. All other systems reviewed and otherwise are negative. Physical exam: General: Alert and oriented x3, nad.  well-developed, well nourished. normal affect, AF. NC/AT, EOMI, neck supple, trachea midline, no JVD present. Breathing is non-labored. Examination of the left knee reveals skin intact. The surgical wound is healing nicely. Staples in place. There is minimal swelling. He does have some resolving ecchymosis. There are no signs for infection or cellulitis present. Range of motion is -2 with no extensor lag and no defects in extensor mechanism. He flexes to 118 degrees. Patella tracks nicely without rubs or crêpes noted. Negative calf tenderness. Negative Homans. No signs of DVT present. Assessment: Status post left total knee replacement    Plan:  At this point, the staples were removed and replaced with Steri-Strips without complications. He will be set up with outpatient physical therapy. He is instructed to continue with range of motion activities on his own on an hourly basis both flexion and extension which were demonstrated for the patient today in office. He will continue with ice therapy. He will discontinue his anticoagulation and go back on Aleve as he requested. We will see him back in about 3 weeks time for evaluation and x-ray of the left knee. He will call with any questions or concerns that shall arise.               JR George BOSS, PA-C, ATC

## 2023-03-10 ENCOUNTER — TELEPHONE (OUTPATIENT)
Age: 53
End: 2023-03-10

## 2023-03-10 NOTE — TELEPHONE ENCOUNTER
Patient called stating that he need his order for outpatient therapy faxed over to [facility]       Fax  [phone]      Phone   [phone]    Please alejandro and advise patient at   [phone]

## 2023-03-10 NOTE — TELEPHONE ENCOUNTER
Order has been faxed at this time. Attempted to contact pt at his cell phone number. Unable to leave a message due to voicemail box not set up.

## 2023-03-22 ENCOUNTER — APPOINTMENT (OUTPATIENT)
Facility: HOSPITAL | Age: 53
End: 2023-03-22
Payer: COMMERCIAL

## 2023-03-22 ENCOUNTER — HOSPITAL ENCOUNTER (EMERGENCY)
Facility: HOSPITAL | Age: 53
Discharge: HOME | End: 2023-03-22
Attending: EMERGENCY MEDICINE
Payer: COMMERCIAL

## 2023-03-22 VITALS
WEIGHT: 230 LBS | DIASTOLIC BLOOD PRESSURE: 74 MMHG | TEMPERATURE: 98.3 F | HEART RATE: 76 BPM | HEIGHT: 70 IN | SYSTOLIC BLOOD PRESSURE: 120 MMHG | RESPIRATION RATE: 16 BRPM | OXYGEN SATURATION: 97 % | BODY MASS INDEX: 32.93 KG/M2

## 2023-03-22 DIAGNOSIS — S61.211A LACERATION OF LEFT INDEX FINGER WITHOUT FOREIGN BODY WITHOUT DAMAGE TO NAIL, INITIAL ENCOUNTER: Primary | ICD-10-CM

## 2023-03-22 DIAGNOSIS — S61.213A LACERATION OF LEFT MIDDLE FINGER WITHOUT FOREIGN BODY WITHOUT DAMAGE TO NAIL, INITIAL ENCOUNTER: ICD-10-CM

## 2023-03-22 PROCEDURE — 99284 EMERGENCY DEPT VISIT MOD MDM: CPT

## 2023-03-22 PROCEDURE — 90471 IMMUNIZATION ADMIN: CPT

## 2023-03-22 PROCEDURE — 6360000002 HC RX W HCPCS

## 2023-03-22 PROCEDURE — 12002 RPR S/N/AX/GEN/TRNK2.6-7.5CM: CPT

## 2023-03-22 PROCEDURE — 90715 TDAP VACCINE 7 YRS/> IM: CPT

## 2023-03-22 PROCEDURE — 6370000000 HC RX 637 (ALT 250 FOR IP)

## 2023-03-22 PROCEDURE — 73130 X-RAY EXAM OF HAND: CPT

## 2023-03-22 RX ORDER — LIDOCAINE HYDROCHLORIDE 10 MG/ML
5 INJECTION, SOLUTION EPIDURAL; INFILTRATION; INTRACAUDAL; PERINEURAL
Status: DISCONTINUED | OUTPATIENT
Start: 2023-03-22 | End: 2023-03-22 | Stop reason: HOSPADM

## 2023-03-22 RX ORDER — HYDROCODONE BITARTRATE AND ACETAMINOPHEN 5; 325 MG/1; MG/1
1 TABLET ORAL
Status: COMPLETED | OUTPATIENT
Start: 2023-03-22 | End: 2023-03-22

## 2023-03-22 RX ADMIN — HYDROCODONE BITARTRATE AND ACETAMINOPHEN 1 TABLET: 5; 325 TABLET ORAL at 18:30

## 2023-03-22 RX ADMIN — TETANUS TOXOID, REDUCED DIPHTHERIA TOXOID AND ACELLULAR PERTUSSIS VACCINE, ADSORBED 0.5 ML: 5; 2.5; 8; 8; 2.5 SUSPENSION INTRAMUSCULAR at 17:10

## 2023-03-22 ASSESSMENT — ENCOUNTER SYMPTOMS
ABDOMINAL DISTENTION: 0
SHORTNESS OF BREATH: 0
ABDOMINAL PAIN: 0
COUGH: 0

## 2023-03-22 NOTE — DISCHARGE INSTRUCTIONS
Patient should keep wound clean and dry, and change dressings frequently, patient should follow-up with hand surgeon, and should return to the ED in 7 days for suture removal, patient should return to ED or immediately of wound has excessive drainage such as blood pus, fever, chills redness, swelling, inability to move finger.

## 2023-03-22 NOTE — ED TRIAGE NOTES
Pt in ED with c/o LAC to Left index and middle finger.  Pt states he was cutting Fish and cut hisself

## 2023-03-23 RX ORDER — CEPHALEXIN 500 MG/1
500 CAPSULE ORAL 2 TIMES DAILY
Qty: 20 CAPSULE | Refills: 0 | Status: SHIPPED | OUTPATIENT
Start: 2023-03-23 | End: 2023-04-02

## 2023-03-23 RX ORDER — OXYCODONE HYDROCHLORIDE AND ACETAMINOPHEN 5; 325 MG/1; MG/1
1 TABLET ORAL EVERY 8 HOURS PRN
Qty: 9 TABLET | Refills: 0 | Status: SHIPPED | OUTPATIENT
Start: 2023-03-23 | End: 2023-03-26

## 2023-03-23 NOTE — ED PROVIDER NOTES
treatment  Anesthesia:     Anesthesia method:  Nerve block and local infiltration    Local anesthetic:  Lidocaine 1% w/o epi    Block location:  Digital    Block anesthetic:  Lidocaine 1% w/o epi    Block injection procedure:  Negative aspiration for blood and anatomic landmarks identified    Block outcome:  Anesthesia achieved  Laceration details:     Location:  Finger    Finger location:  L long finger    Length (cm):  1    Depth (mm):  0.3  Pre-procedure details:     Preparation:  Patient was prepped and draped in usual sterile fashion and imaging obtained to evaluate for foreign bodies  Exploration:     Wound extent: no foreign bodies/material noted, no muscle damage noted, no nerve damage noted, no tendon damage noted and no underlying fracture noted      Contaminated: no    Treatment:     Amount of cleaning:  Extensive    Irrigation solution:  Sterile saline    Irrigation method:  Syringe    Visualized foreign bodies/material removed: no    Skin repair:     Repair method:  Sutures    Suture size:  5-0    Number of sutures:  2  Approximation:     Approximation:  Close  Repair type:     Repair type:  Simple  Post-procedure details:     Dressing:  Non-adherent dressing and sterile dressing    . proc    Records Reviewed: Past medical records     Provider Notes (Medical Decision Making): 1year-old healthy male presented ambulatory to the ED after sustaining a laceration to his left index and middle finger from a tuna can. On physical exam patient was well-appearing, wounds were approximately 3 cm long and 1 cm deep on the index finger and 1 cm and less than a half a centimeter deep on the middle finger. On examination of the fingers patient still had full range of motion, was neurovascular intact with strong radial pulses, and adequate cap refill, there was no signs of muscle nerve or tendon involvement.   x-ray of the finger showed no signs of retained foreign body or fracture.   Simple laceration was repaired

## 2023-03-31 ENCOUNTER — OFFICE VISIT (OUTPATIENT)
Age: 53
End: 2023-03-31

## 2023-03-31 VITALS — TEMPERATURE: 97.5 F | WEIGHT: 217 LBS | HEIGHT: 70 IN | BODY MASS INDEX: 31.07 KG/M2

## 2023-03-31 DIAGNOSIS — Z96.659 AFTERCARE FOLLOWING KNEE JOINT REPLACEMENT SURGERY, UNSPECIFIED LATERALITY: ICD-10-CM

## 2023-03-31 DIAGNOSIS — G89.18 ACUTE POSTOPERATIVE PAIN: ICD-10-CM

## 2023-03-31 DIAGNOSIS — Z96.652 PRESENCE OF LEFT ARTIFICIAL KNEE JOINT: Primary | ICD-10-CM

## 2023-03-31 DIAGNOSIS — Z47.1 AFTERCARE FOLLOWING KNEE JOINT REPLACEMENT SURGERY, UNSPECIFIED LATERALITY: ICD-10-CM

## 2023-03-31 RX ORDER — TRAMADOL HYDROCHLORIDE 50 MG/1
50-100 TABLET ORAL 2 TIMES DAILY PRN
Qty: 12 TABLET | Refills: 0 | Status: SHIPPED | OUTPATIENT
Start: 2023-03-31 | End: 2023-04-07

## 2023-03-31 RX ORDER — NAPROXEN SODIUM 220 MG
TABLET ORAL
COMMUNITY

## 2023-03-31 RX ORDER — NAPROXEN 500 MG/1
TABLET ORAL
COMMUNITY

## 2023-03-31 NOTE — PROGRESS NOTES
Robe Qiu returns the office just over a 30 days status post his primary left total knee replacement. Doing very well today. He is continuing weightbearing as tolerated left lower extremity. He gets a pain in the overnight hours which she requests medication to manage. He has not participated in in the home or outpatient physical therapy to date. Over the anterior aspect of the left knee in a craniocaudal orientation reveals intact healing surgical incision measuring 20 cm [id number]. We borders well approximated. There is a small 3 mm medial lateral 4 mm craniocaudal scab noted superior pole of the surgical site without surrounding evidence of skin infection or discharge. Area is nontender throughout. No indurations or fluctuance surrounding the surgical incision. Active range of motion lacking 10 degrees to full extension. He is flexing to 95 degrees. No calf tenderness or evidence of DVT left lower extremity[facility] of the left knee AP and lateral reveals total joint prosthesis intact with no evidence of periprosthetic fracture or dislocation. There is a retained cancellous cannulated screw noted in the patella. Alignment of the prosthesis is anatomical.  No lytic or blastic lesions. No soft tissue ossifications. Plan: Patient recommended that he would benefit from outpatient physical therapy services per protocol. Patient agreed. A order was written for such. In addition he is living at [facility] in [place] and a letter was prepared indicating that he needs 3 times weekly outpatient physical therapy services as well as rest with limited activities during the course of his waking hours for at least the next 30 days. Patient to follow-up in 1 month.

## 2023-03-31 NOTE — LETTER
[facility] [place] [address]  [address], [place] [address] [address]  Phone: [phone]  Fax: [phone]    Cesar Schofield    To whom it may concern: The above patient recently underwent a primary left total knee replacement under our direction. He requires the followin. Transportation to physical therapy up to 3 times a week for the next 30 days. 2.  The allowance to use pain medication in the overnight hours for symptom management. 3.  The daily allowance during waking hours with decreased exertional activities and at rest if necessary.         2023            Sincerely,        Ton Tucker PA-C

## 2023-04-09 ENCOUNTER — HOSPITAL ENCOUNTER (INPATIENT)
Facility: HOSPITAL | Age: 53
LOS: 10 days | Discharge: NURSING, REHAB OR LONG-TERM CARE | DRG: 751 | End: 2023-04-19
Attending: STUDENT IN AN ORGANIZED HEALTH CARE EDUCATION/TRAINING PROGRAM | Admitting: PSYCHIATRY & NEUROLOGY
Payer: COMMERCIAL

## 2023-04-09 ENCOUNTER — APPOINTMENT (OUTPATIENT)
Facility: HOSPITAL | Age: 53
DRG: 751 | End: 2023-04-09
Payer: COMMERCIAL

## 2023-04-09 DIAGNOSIS — S80.02XA CONTUSION OF LEFT KNEE, INITIAL ENCOUNTER: Primary | ICD-10-CM

## 2023-04-09 DIAGNOSIS — R45.851 SUICIDAL IDEATION: ICD-10-CM

## 2023-04-09 PROBLEM — F39 MOOD DISORDER (HCC): Status: ACTIVE | Noted: 2023-04-09

## 2023-04-09 LAB
AMPHET UR QL SCN: NEGATIVE
ANION GAP SERPL CALC-SCNC: 5 MMOL/L (ref 3–18)
BARBITURATES UR QL SCN: NEGATIVE
BASOPHILS # BLD: 0.1 K/UL (ref 0–0.1)
BASOPHILS NFR BLD: 2 % (ref 0–2)
BENZODIAZ UR QL: NEGATIVE
BUN SERPL-MCNC: 8 MG/DL (ref 7–18)
BUN/CREAT SERPL: 10 (ref 12–20)
CALCIUM SERPL-MCNC: 8.7 MG/DL (ref 8.5–10.1)
CANNABINOIDS UR QL SCN: NEGATIVE
CHLORIDE SERPL-SCNC: 104 MMOL/L (ref 100–111)
CO2 SERPL-SCNC: 28 MMOL/L (ref 21–32)
COCAINE UR QL SCN: POSITIVE
CREAT SERPL-MCNC: 0.84 MG/DL (ref 0.6–1.3)
DIFFERENTIAL METHOD BLD: NORMAL
EKG ATRIAL RATE: 81 BPM
EKG DIAGNOSIS: NORMAL
EKG P AXIS: 15 DEGREES
EKG P-R INTERVAL: 150 MS
EKG Q-T INTERVAL: 372 MS
EKG QRS DURATION: 106 MS
EKG QTC CALCULATION (BAZETT): 432 MS
EKG R AXIS: -39 DEGREES
EKG T AXIS: 8 DEGREES
EKG VENTRICULAR RATE: 81 BPM
EOSINOPHIL # BLD: 0.1 K/UL (ref 0–0.4)
EOSINOPHIL NFR BLD: 1 % (ref 0–5)
ERYTHROCYTE [DISTWIDTH] IN BLOOD BY AUTOMATED COUNT: 13.7 % (ref 11.6–14.5)
ETHANOL SERPL-MCNC: 5 MG/DL (ref 0–3)
FLUAV RNA SPEC QL NAA+PROBE: NOT DETECTED
FLUBV RNA SPEC QL NAA+PROBE: NOT DETECTED
GLUCOSE SERPL-MCNC: 78 MG/DL (ref 74–99)
HCT VFR BLD AUTO: 42.5 % (ref 36–48)
HGB BLD-MCNC: 14.4 G/DL (ref 13–16)
IMM GRANULOCYTES # BLD AUTO: 0 K/UL (ref 0–0.04)
IMM GRANULOCYTES NFR BLD AUTO: 0 % (ref 0–0.5)
LYMPHOCYTES # BLD: 1.9 K/UL (ref 0.9–3.6)
LYMPHOCYTES NFR BLD: 26 % (ref 21–52)
Lab: ABNORMAL
MCH RBC QN AUTO: 31.4 PG (ref 24–34)
MCHC RBC AUTO-ENTMCNC: 33.9 G/DL (ref 31–37)
MCV RBC AUTO: 92.6 FL (ref 78–100)
METHADONE UR QL: NEGATIVE
MONOCYTES # BLD: 0.6 K/UL (ref 0.05–1.2)
MONOCYTES NFR BLD: 8 % (ref 3–10)
NEUTS SEG # BLD: 4.6 K/UL (ref 1.8–8)
NEUTS SEG NFR BLD: 62 % (ref 40–73)
NRBC # BLD: 0 K/UL (ref 0–0.01)
NRBC BLD-RTO: 0 PER 100 WBC
OPIATES UR QL: NEGATIVE
PCP UR QL: NEGATIVE
PLATELET # BLD AUTO: 331 K/UL (ref 135–420)
PMV BLD AUTO: 10.3 FL (ref 9.2–11.8)
POTASSIUM SERPL-SCNC: 4.2 MMOL/L (ref 3.5–5.5)
RBC # BLD AUTO: 4.59 M/UL (ref 4.35–5.65)
SARS-COV-2 RNA RESP QL NAA+PROBE: NOT DETECTED
SODIUM SERPL-SCNC: 137 MMOL/L (ref 136–145)
TROPONIN I SERPL HS-MCNC: 29 NG/L (ref 0–78)
WBC # BLD AUTO: 7.3 K/UL (ref 4.6–13.2)

## 2023-04-09 PROCEDURE — 1140000000 HC RM PRIVATE PSYCH

## 2023-04-09 PROCEDURE — 80307 DRUG TEST PRSMV CHEM ANLYZR: CPT

## 2023-04-09 PROCEDURE — 6370000000 HC RX 637 (ALT 250 FOR IP): Performed by: STUDENT IN AN ORGANIZED HEALTH CARE EDUCATION/TRAINING PROGRAM

## 2023-04-09 PROCEDURE — 82077 ASSAY SPEC XCP UR&BREATH IA: CPT

## 2023-04-09 PROCEDURE — 71045 X-RAY EXAM CHEST 1 VIEW: CPT

## 2023-04-09 PROCEDURE — 93005 ELECTROCARDIOGRAM TRACING: CPT | Performed by: STUDENT IN AN ORGANIZED HEALTH CARE EDUCATION/TRAINING PROGRAM

## 2023-04-09 PROCEDURE — 80048 BASIC METABOLIC PNL TOTAL CA: CPT

## 2023-04-09 PROCEDURE — 99285 EMERGENCY DEPT VISIT HI MDM: CPT

## 2023-04-09 PROCEDURE — 2580000003 HC RX 258: Performed by: STUDENT IN AN ORGANIZED HEALTH CARE EDUCATION/TRAINING PROGRAM

## 2023-04-09 PROCEDURE — 85025 COMPLETE CBC W/AUTO DIFF WBC: CPT

## 2023-04-09 PROCEDURE — 87636 SARSCOV2 & INF A&B AMP PRB: CPT

## 2023-04-09 PROCEDURE — 73562 X-RAY EXAM OF KNEE 3: CPT

## 2023-04-09 PROCEDURE — 84484 ASSAY OF TROPONIN QUANT: CPT

## 2023-04-09 PROCEDURE — 93010 ELECTROCARDIOGRAM REPORT: CPT | Performed by: INTERNAL MEDICINE

## 2023-04-09 RX ORDER — ACETAMINOPHEN 325 MG/1
650 TABLET ORAL EVERY 4 HOURS PRN
Status: DISCONTINUED | OUTPATIENT
Start: 2023-04-09 | End: 2023-04-19 | Stop reason: HOSPADM

## 2023-04-09 RX ORDER — IBUPROFEN 400 MG/1
800 TABLET ORAL ONCE
Status: COMPLETED | OUTPATIENT
Start: 2023-04-09 | End: 2023-04-09

## 2023-04-09 RX ORDER — 0.9 % SODIUM CHLORIDE 0.9 %
1000 INTRAVENOUS SOLUTION INTRAVENOUS ONCE
Status: COMPLETED | OUTPATIENT
Start: 2023-04-09 | End: 2023-04-09

## 2023-04-09 RX ORDER — POLYETHYLENE GLYCOL 3350 17 G/17G
17 POWDER, FOR SOLUTION ORAL DAILY PRN
Status: DISCONTINUED | OUTPATIENT
Start: 2023-04-09 | End: 2023-04-19 | Stop reason: HOSPADM

## 2023-04-09 RX ADMIN — IBUPROFEN 800 MG: 400 TABLET ORAL at 15:04

## 2023-04-09 RX ADMIN — SODIUM CHLORIDE 1000 ML: 9 INJECTION, SOLUTION INTRAVENOUS at 09:08

## 2023-04-09 ASSESSMENT — PAIN SCALES - GENERAL
PAINLEVEL_OUTOF10: 7
PAINLEVEL_OUTOF10: 9

## 2023-04-09 ASSESSMENT — ENCOUNTER SYMPTOMS
ABDOMINAL PAIN: 0
SHORTNESS OF BREATH: 0
VOMITING: 0
DIARRHEA: 0
CHEST TIGHTNESS: 0
NAUSEA: 0
SORE THROAT: 0

## 2023-04-09 ASSESSMENT — PAIN - FUNCTIONAL ASSESSMENT
PAIN_FUNCTIONAL_ASSESSMENT: 0-10
PAIN_FUNCTIONAL_ASSESSMENT: ACTIVITIES ARE NOT PREVENTED

## 2023-04-09 ASSESSMENT — PAIN DESCRIPTION - ORIENTATION
ORIENTATION: LEFT
ORIENTATION: LEFT

## 2023-04-09 ASSESSMENT — PAIN DESCRIPTION - LOCATION
LOCATION: KNEE
LOCATION: KNEE

## 2023-04-09 ASSESSMENT — PAIN DESCRIPTION - DESCRIPTORS
DESCRIPTORS: ACHING
DESCRIPTORS: ACHING

## 2023-04-09 ASSESSMENT — PAIN DESCRIPTION - PAIN TYPE
TYPE: CHRONIC PAIN
TYPE: SURGICAL PAIN

## 2023-04-10 PROCEDURE — 6370000000 HC RX 637 (ALT 250 FOR IP): Performed by: PSYCHIATRY & NEUROLOGY

## 2023-04-10 PROCEDURE — 1240000000 HC EMOTIONAL WELLNESS R&B

## 2023-04-10 PROCEDURE — 6370000000 HC RX 637 (ALT 250 FOR IP): Performed by: EMERGENCY MEDICINE

## 2023-04-10 RX ORDER — POTASSIUM CHLORIDE 20 MEQ/1
40 TABLET, EXTENDED RELEASE ORAL ONCE
Status: COMPLETED | OUTPATIENT
Start: 2023-04-10 | End: 2023-04-10

## 2023-04-10 RX ADMIN — ACETAMINOPHEN 650 MG: 325 TABLET, FILM COATED ORAL at 20:34

## 2023-04-10 RX ADMIN — POTASSIUM CHLORIDE 40 MEQ: 1500 TABLET, EXTENDED RELEASE ORAL at 06:03

## 2023-04-10 RX ADMIN — ACETAMINOPHEN 650 MG: 325 TABLET, FILM COATED ORAL at 06:03

## 2023-04-10 RX ADMIN — ACETAMINOPHEN 650 MG: 325 TABLET, FILM COATED ORAL at 15:24

## 2023-04-10 ASSESSMENT — PAIN DESCRIPTION - LOCATION
LOCATION: KNEE
LOCATION: KNEE

## 2023-04-10 ASSESSMENT — SLEEP AND FATIGUE QUESTIONNAIRES
DO YOU USE A SLEEP AID: NO
SLEEP PATTERN: DIFFICULTY FALLING ASLEEP;DISTURBED/INTERRUPTED SLEEP
AVERAGE NUMBER OF SLEEP HOURS: 4
DO YOU HAVE DIFFICULTY SLEEPING: YES

## 2023-04-10 ASSESSMENT — PAIN SCALES - GENERAL
PAINLEVEL_OUTOF10: 9
PAINLEVEL_OUTOF10: 0
PAINLEVEL_OUTOF10: 8

## 2023-04-10 ASSESSMENT — LIFESTYLE VARIABLES
HOW OFTEN DO YOU HAVE A DRINK CONTAINING ALCOHOL: 4 OR MORE TIMES A WEEK
HOW MANY STANDARD DRINKS CONTAINING ALCOHOL DO YOU HAVE ON A TYPICAL DAY: 10 OR MORE

## 2023-04-10 ASSESSMENT — PAIN DESCRIPTION - FREQUENCY: FREQUENCY: INTERMITTENT

## 2023-04-10 ASSESSMENT — PAIN DESCRIPTION - ONSET: ONSET: GRADUAL

## 2023-04-10 ASSESSMENT — PAIN DESCRIPTION - PAIN TYPE: TYPE: CHRONIC PAIN

## 2023-04-11 PROBLEM — F33.2 SEVERE RECURRENT MAJOR DEPRESSION WITHOUT PSYCHOTIC FEATURES (HCC): Status: ACTIVE | Noted: 2023-04-11

## 2023-04-11 PROBLEM — F39 MOOD DISORDER (HCC): Status: RESOLVED | Noted: 2023-04-09 | Resolved: 2023-04-11

## 2023-04-11 PROCEDURE — 6370000000 HC RX 637 (ALT 250 FOR IP): Performed by: PSYCHIATRY & NEUROLOGY

## 2023-04-11 PROCEDURE — 97162 PT EVAL MOD COMPLEX 30 MIN: CPT

## 2023-04-11 PROCEDURE — 6370000000 HC RX 637 (ALT 250 FOR IP): Performed by: STUDENT IN AN ORGANIZED HEALTH CARE EDUCATION/TRAINING PROGRAM

## 2023-04-11 PROCEDURE — 1240000000 HC EMOTIONAL WELLNESS R&B

## 2023-04-11 PROCEDURE — 99222 1ST HOSP IP/OBS MODERATE 55: CPT | Performed by: PSYCHIATRY & NEUROLOGY

## 2023-04-11 PROCEDURE — 97116 GAIT TRAINING THERAPY: CPT

## 2023-04-11 RX ORDER — SODIUM CHLORIDE 9 MG/ML
INJECTION, SOLUTION INTRAVENOUS PRN
Status: DISCONTINUED | OUTPATIENT
Start: 2023-04-11 | End: 2023-04-11

## 2023-04-11 RX ORDER — LORAZEPAM 1 MG/1
2 TABLET ORAL EVERY 4 HOURS PRN
Status: DISCONTINUED | OUTPATIENT
Start: 2023-04-11 | End: 2023-04-14

## 2023-04-11 RX ORDER — LORAZEPAM 1 MG/1
1 TABLET ORAL EVERY 4 HOURS PRN
Status: DISCONTINUED | OUTPATIENT
Start: 2023-04-11 | End: 2023-04-14

## 2023-04-11 RX ORDER — CITALOPRAM 10 MG/1
10 TABLET ORAL DAILY
Status: DISCONTINUED | OUTPATIENT
Start: 2023-04-11 | End: 2023-04-14

## 2023-04-11 RX ORDER — SODIUM CHLORIDE 0.9 % (FLUSH) 0.9 %
5-40 SYRINGE (ML) INJECTION EVERY 12 HOURS SCHEDULED
Status: DISCONTINUED | OUTPATIENT
Start: 2023-04-11 | End: 2023-04-11

## 2023-04-11 RX ORDER — LIDOCAINE 4 G/G
1 PATCH TOPICAL DAILY
Status: DISCONTINUED | OUTPATIENT
Start: 2023-04-11 | End: 2023-04-19 | Stop reason: HOSPADM

## 2023-04-11 RX ORDER — MELOXICAM 7.5 MG/1
7.5 TABLET ORAL DAILY
Status: DISCONTINUED | OUTPATIENT
Start: 2023-04-11 | End: 2023-04-18

## 2023-04-11 RX ORDER — MULTIVITAMIN WITH IRON
1 TABLET ORAL DAILY
Status: DISCONTINUED | OUTPATIENT
Start: 2023-04-11 | End: 2023-04-19 | Stop reason: HOSPADM

## 2023-04-11 RX ORDER — GAUZE BANDAGE 2" X 2"
100 BANDAGE TOPICAL DAILY
Status: DISCONTINUED | OUTPATIENT
Start: 2023-04-11 | End: 2023-04-19 | Stop reason: HOSPADM

## 2023-04-11 RX ORDER — SODIUM CHLORIDE 0.9 % (FLUSH) 0.9 %
5-40 SYRINGE (ML) INJECTION PRN
Status: DISCONTINUED | OUTPATIENT
Start: 2023-04-11 | End: 2023-04-11

## 2023-04-11 RX ADMIN — MELOXICAM 7.5 MG: 7.5 TABLET ORAL at 16:55

## 2023-04-11 RX ADMIN — Medication 100 MG: at 12:56

## 2023-04-11 RX ADMIN — THERA TABS 1 TABLET: TAB at 12:56

## 2023-04-11 RX ADMIN — CITALOPRAM HYDROBROMIDE 10 MG: 10 TABLET ORAL at 12:56

## 2023-04-11 ASSESSMENT — PAIN SCALES - GENERAL
PAINLEVEL_OUTOF10: 0
PAINLEVEL_OUTOF10: 5

## 2023-04-11 ASSESSMENT — PAIN DESCRIPTION - LOCATION: LOCATION: KNEE

## 2023-04-11 ASSESSMENT — PAIN DESCRIPTION - ORIENTATION: ORIENTATION: RIGHT

## 2023-04-12 LAB
ALBUMIN SERPL-MCNC: 3.3 G/DL (ref 3.4–5)
ALBUMIN/GLOB SERPL: 0.8 (ref 0.8–1.7)
ALP SERPL-CCNC: 124 U/L (ref 45–117)
ALT SERPL-CCNC: 43 U/L (ref 16–61)
AST SERPL-CCNC: 36 U/L (ref 10–38)
BILIRUB DIRECT SERPL-MCNC: 0.1 MG/DL (ref 0–0.2)
BILIRUB SERPL-MCNC: 0.7 MG/DL (ref 0.2–1)
GLOBULIN SER CALC-MCNC: 4 G/DL (ref 2–4)
PROT SERPL-MCNC: 7.3 G/DL (ref 6.4–8.2)
TSH SERPL DL<=0.05 MIU/L-ACNC: 1.18 UIU/ML (ref 0.36–3.74)

## 2023-04-12 PROCEDURE — 1240000000 HC EMOTIONAL WELLNESS R&B

## 2023-04-12 PROCEDURE — 6370000000 HC RX 637 (ALT 250 FOR IP): Performed by: PSYCHIATRY & NEUROLOGY

## 2023-04-12 PROCEDURE — 84443 ASSAY THYROID STIM HORMONE: CPT

## 2023-04-12 PROCEDURE — 80076 HEPATIC FUNCTION PANEL: CPT

## 2023-04-12 PROCEDURE — 36415 COLL VENOUS BLD VENIPUNCTURE: CPT

## 2023-04-12 PROCEDURE — 99231 SBSQ HOSP IP/OBS SF/LOW 25: CPT | Performed by: PSYCHIATRY & NEUROLOGY

## 2023-04-12 PROCEDURE — 6370000000 HC RX 637 (ALT 250 FOR IP): Performed by: STUDENT IN AN ORGANIZED HEALTH CARE EDUCATION/TRAINING PROGRAM

## 2023-04-12 RX ADMIN — ACETAMINOPHEN 650 MG: 325 TABLET, FILM COATED ORAL at 22:13

## 2023-04-12 RX ADMIN — CITALOPRAM HYDROBROMIDE 10 MG: 10 TABLET ORAL at 08:09

## 2023-04-12 RX ADMIN — MELOXICAM 7.5 MG: 7.5 TABLET ORAL at 08:08

## 2023-04-12 RX ADMIN — LORAZEPAM 1 MG: 1 TABLET ORAL at 20:21

## 2023-04-12 RX ADMIN — Medication 100 MG: at 08:08

## 2023-04-12 RX ADMIN — THERA TABS 1 TABLET: TAB at 08:08

## 2023-04-12 ASSESSMENT — PAIN DESCRIPTION - DESCRIPTORS
DESCRIPTORS: ACHING
DESCRIPTORS: ACHING

## 2023-04-12 ASSESSMENT — PAIN DESCRIPTION - LOCATION
LOCATION: HEAD
LOCATION: GENERALIZED

## 2023-04-12 ASSESSMENT — PAIN SCALES - GENERAL
PAINLEVEL_OUTOF10: 0
PAINLEVEL_OUTOF10: 0
PAINLEVEL_OUTOF10: 5
PAINLEVEL_OUTOF10: 0
PAINLEVEL_OUTOF10: 5

## 2023-04-13 PROCEDURE — 6370000000 HC RX 637 (ALT 250 FOR IP): Performed by: PSYCHIATRY & NEUROLOGY

## 2023-04-13 PROCEDURE — 99231 SBSQ HOSP IP/OBS SF/LOW 25: CPT | Performed by: PSYCHIATRY & NEUROLOGY

## 2023-04-13 PROCEDURE — 6370000000 HC RX 637 (ALT 250 FOR IP): Performed by: STUDENT IN AN ORGANIZED HEALTH CARE EDUCATION/TRAINING PROGRAM

## 2023-04-13 PROCEDURE — 1240000000 HC EMOTIONAL WELLNESS R&B

## 2023-04-13 RX ADMIN — THERA TABS 1 TABLET: TAB at 08:19

## 2023-04-13 RX ADMIN — ACETAMINOPHEN 650 MG: 325 TABLET, FILM COATED ORAL at 19:49

## 2023-04-13 RX ADMIN — Medication 100 MG: at 08:18

## 2023-04-13 RX ADMIN — MELOXICAM 7.5 MG: 7.5 TABLET ORAL at 08:16

## 2023-04-13 RX ADMIN — LORAZEPAM 1 MG: 1 TABLET ORAL at 19:48

## 2023-04-13 RX ADMIN — ACETAMINOPHEN 650 MG: 325 TABLET, FILM COATED ORAL at 08:19

## 2023-04-13 RX ADMIN — CITALOPRAM HYDROBROMIDE 10 MG: 10 TABLET ORAL at 08:18

## 2023-04-13 ASSESSMENT — PAIN SCALES - GENERAL
PAINLEVEL_OUTOF10: 0
PAINLEVEL_OUTOF10: 0
PAINLEVEL_OUTOF10: 8

## 2023-04-13 ASSESSMENT — PAIN DESCRIPTION - LOCATION
LOCATION: KNEE

## 2023-04-13 ASSESSMENT — PAIN - FUNCTIONAL ASSESSMENT
PAIN_FUNCTIONAL_ASSESSMENT: ACTIVITIES ARE NOT PREVENTED
PAIN_FUNCTIONAL_ASSESSMENT: ACTIVITIES ARE NOT PREVENTED

## 2023-04-13 ASSESSMENT — PAIN DESCRIPTION - ORIENTATION
ORIENTATION: LEFT
ORIENTATION: LEFT

## 2023-04-13 ASSESSMENT — PAIN DESCRIPTION - DESCRIPTORS
DESCRIPTORS: THROBBING
DESCRIPTORS: THROBBING
DESCRIPTORS: ACHING

## 2023-04-14 PROCEDURE — 6370000000 HC RX 637 (ALT 250 FOR IP): Performed by: STUDENT IN AN ORGANIZED HEALTH CARE EDUCATION/TRAINING PROGRAM

## 2023-04-14 PROCEDURE — 6370000000 HC RX 637 (ALT 250 FOR IP): Performed by: PSYCHIATRY & NEUROLOGY

## 2023-04-14 PROCEDURE — 99231 SBSQ HOSP IP/OBS SF/LOW 25: CPT | Performed by: PSYCHIATRY & NEUROLOGY

## 2023-04-14 PROCEDURE — 1240000000 HC EMOTIONAL WELLNESS R&B

## 2023-04-14 RX ORDER — CITALOPRAM 20 MG/1
20 TABLET ORAL DAILY
Status: DISCONTINUED | OUTPATIENT
Start: 2023-04-15 | End: 2023-04-19 | Stop reason: HOSPADM

## 2023-04-14 RX ADMIN — ACETAMINOPHEN 650 MG: 325 TABLET, FILM COATED ORAL at 13:21

## 2023-04-14 RX ADMIN — CITALOPRAM HYDROBROMIDE 10 MG: 10 TABLET ORAL at 08:31

## 2023-04-14 RX ADMIN — MELOXICAM 7.5 MG: 7.5 TABLET ORAL at 08:31

## 2023-04-14 RX ADMIN — THERA TABS 1 TABLET: TAB at 08:31

## 2023-04-14 RX ADMIN — Medication 100 MG: at 08:31

## 2023-04-14 RX ADMIN — ACETAMINOPHEN 650 MG: 325 TABLET, FILM COATED ORAL at 20:35

## 2023-04-14 ASSESSMENT — PAIN SCALES - GENERAL: PAINLEVEL_OUTOF10: 8

## 2023-04-14 ASSESSMENT — PAIN DESCRIPTION - ORIENTATION: ORIENTATION: RIGHT

## 2023-04-14 ASSESSMENT — PAIN DESCRIPTION - LOCATION: LOCATION: LEG

## 2023-04-15 PROCEDURE — 6370000000 HC RX 637 (ALT 250 FOR IP): Performed by: PSYCHIATRY & NEUROLOGY

## 2023-04-15 PROCEDURE — 6370000000 HC RX 637 (ALT 250 FOR IP): Performed by: STUDENT IN AN ORGANIZED HEALTH CARE EDUCATION/TRAINING PROGRAM

## 2023-04-15 PROCEDURE — 1240000000 HC EMOTIONAL WELLNESS R&B

## 2023-04-15 PROCEDURE — 99231 SBSQ HOSP IP/OBS SF/LOW 25: CPT | Performed by: PSYCHIATRY & NEUROLOGY

## 2023-04-15 RX ADMIN — MELOXICAM 7.5 MG: 7.5 TABLET ORAL at 08:59

## 2023-04-15 RX ADMIN — ACETAMINOPHEN 650 MG: 325 TABLET, FILM COATED ORAL at 08:59

## 2023-04-15 RX ADMIN — THERA TABS 1 TABLET: TAB at 08:59

## 2023-04-15 RX ADMIN — CITALOPRAM HYDROBROMIDE 20 MG: 20 TABLET ORAL at 08:59

## 2023-04-15 RX ADMIN — Medication 100 MG: at 08:59

## 2023-04-15 ASSESSMENT — PAIN SCALES - GENERAL: PAINLEVEL_OUTOF10: 8

## 2023-04-15 ASSESSMENT — PAIN DESCRIPTION - LOCATION: LOCATION: KNEE

## 2023-04-16 PROCEDURE — 6370000000 HC RX 637 (ALT 250 FOR IP): Performed by: STUDENT IN AN ORGANIZED HEALTH CARE EDUCATION/TRAINING PROGRAM

## 2023-04-16 PROCEDURE — 6370000000 HC RX 637 (ALT 250 FOR IP): Performed by: PSYCHIATRY & NEUROLOGY

## 2023-04-16 PROCEDURE — 99231 SBSQ HOSP IP/OBS SF/LOW 25: CPT | Performed by: PSYCHIATRY & NEUROLOGY

## 2023-04-16 PROCEDURE — 1240000000 HC EMOTIONAL WELLNESS R&B

## 2023-04-16 RX ORDER — HYDROXYZINE PAMOATE 50 MG/1
50 CAPSULE ORAL EVERY 6 HOURS PRN
Status: DISCONTINUED | OUTPATIENT
Start: 2023-04-16 | End: 2023-04-19 | Stop reason: HOSPADM

## 2023-04-16 RX ADMIN — MELOXICAM 7.5 MG: 7.5 TABLET ORAL at 08:14

## 2023-04-16 RX ADMIN — CITALOPRAM HYDROBROMIDE 20 MG: 20 TABLET ORAL at 08:14

## 2023-04-16 RX ADMIN — THERA TABS 1 TABLET: TAB at 08:14

## 2023-04-16 RX ADMIN — Medication 100 MG: at 08:14

## 2023-04-16 RX ADMIN — ACETAMINOPHEN 650 MG: 325 TABLET, FILM COATED ORAL at 17:08

## 2023-04-16 ASSESSMENT — PAIN DESCRIPTION - DESCRIPTORS: DESCRIPTORS: ACHING

## 2023-04-16 ASSESSMENT — PAIN DESCRIPTION - LOCATION: LOCATION: LEG

## 2023-04-16 ASSESSMENT — PAIN DESCRIPTION - ORIENTATION: ORIENTATION: RIGHT

## 2023-04-16 ASSESSMENT — PAIN SCALES - GENERAL: PAINLEVEL_OUTOF10: 8

## 2023-04-17 PROCEDURE — 6370000000 HC RX 637 (ALT 250 FOR IP): Performed by: PSYCHIATRY & NEUROLOGY

## 2023-04-17 PROCEDURE — 6370000000 HC RX 637 (ALT 250 FOR IP): Performed by: STUDENT IN AN ORGANIZED HEALTH CARE EDUCATION/TRAINING PROGRAM

## 2023-04-17 PROCEDURE — 99231 SBSQ HOSP IP/OBS SF/LOW 25: CPT | Performed by: PSYCHIATRY & NEUROLOGY

## 2023-04-17 PROCEDURE — 1240000000 HC EMOTIONAL WELLNESS R&B

## 2023-04-17 RX ORDER — FOLIC ACID 1 MG/1
1 TABLET ORAL DAILY
Status: DISCONTINUED | OUTPATIENT
Start: 2023-04-17 | End: 2023-04-19 | Stop reason: HOSPADM

## 2023-04-17 RX ADMIN — CITALOPRAM HYDROBROMIDE 20 MG: 20 TABLET ORAL at 08:37

## 2023-04-17 RX ADMIN — FOLIC ACID 1 MG: 1 TABLET ORAL at 13:16

## 2023-04-17 RX ADMIN — THERA TABS 1 TABLET: TAB at 08:37

## 2023-04-17 RX ADMIN — HYDROXYZINE PAMOATE 50 MG: 50 CAPSULE ORAL at 21:15

## 2023-04-17 RX ADMIN — Medication 100 MG: at 08:37

## 2023-04-17 RX ADMIN — MELOXICAM 7.5 MG: 7.5 TABLET ORAL at 08:37

## 2023-04-17 RX ADMIN — ACETAMINOPHEN 650 MG: 325 TABLET, FILM COATED ORAL at 21:15

## 2023-04-17 ASSESSMENT — PAIN SCALES - GENERAL
PAINLEVEL_OUTOF10: 8
PAINLEVEL_OUTOF10: 5

## 2023-04-17 ASSESSMENT — PAIN DESCRIPTION - DESCRIPTORS
DESCRIPTORS: ACHING
DESCRIPTORS: ACHING

## 2023-04-17 ASSESSMENT — PAIN DESCRIPTION - LOCATION
LOCATION: LEG
LOCATION: FOOT

## 2023-04-17 ASSESSMENT — PAIN DESCRIPTION - ORIENTATION
ORIENTATION: LEFT
ORIENTATION: RIGHT;LEFT

## 2023-04-17 ASSESSMENT — PAIN - FUNCTIONAL ASSESSMENT: PAIN_FUNCTIONAL_ASSESSMENT: ACTIVITIES ARE NOT PREVENTED

## 2023-04-17 NOTE — GROUP NOTE
Group Therapy Note    Date: 4/17/2023    Group Start Time: 0730  Group End Time: 0745  Group Topic: Nursing    [facility] ADULT CHEM DEP    Neha Barnes RN    Spirituality- \"It's Time for an Upgrade in Your Spiritual Life\"    Group Therapy Note    Attendees: 6       Patient's Goal:  \"Get My Life Right So I Can Lead Others to [place]\"    Notes:  n/a    Status After Intervention:  Improved    Participation Level:  Active Listener    Participation Quality: Appropriate      Speech:  normal      Thought Process/Content: Logical      Affective Functioning: Congruent      Mood: euthymic      Level of consciousness:  Alert      Response to Learning: Able to verbalize current knowledge/experience      Endings: None Reported    Modes of Intervention: Media      Discipline Responsible: Registered Nurse      Signature:  Neha Barnes RN

## 2023-04-17 NOTE — PLAN OF CARE
Problem: Self Harm/Suicidality  Goal: Will have no self-injury during hospital stay  Description: INTERVENTIONS:  1. Ensure constant observer at bedside with Q15M safety checks  2. Maintain a safe environment  3. Secure patient belongings  4. Ensure family/visitors adhere to safety recommendations  5. Ensure safety tray has been added to patient's diet order  6. Every shift and PRN: Re-assess suicidal risk via Frequent Screener    Outcome: Progressing     Problem: Pain  Goal: Verbalizes/displays adequate comfort level or baseline comfort level  Outcome: Progressing     Problem: Anxiety  Goal: Will report anxiety at manageable levels  Description: INTERVENTIONS:  1. Administer medication as ordered  2. Teach and rehearse alternative coping skills  3. Provide emotional support with 1:1 interaction with staff  Outcome: Progressing    Pt presents with dull affect, euthymic mood, with linear and goal-directed thought process. Pt has been social on the unit, and attends all groups. Pt displays appropriate boundaries on the unit. Pt denies SI/HI/AVH. Pt is medication compliant.

## 2023-04-18 PROCEDURE — 1240000000 HC EMOTIONAL WELLNESS R&B

## 2023-04-18 PROCEDURE — 6370000000 HC RX 637 (ALT 250 FOR IP): Performed by: PSYCHIATRY & NEUROLOGY

## 2023-04-18 PROCEDURE — 99231 SBSQ HOSP IP/OBS SF/LOW 25: CPT | Performed by: PSYCHIATRY & NEUROLOGY

## 2023-04-18 PROCEDURE — 6370000000 HC RX 637 (ALT 250 FOR IP): Performed by: STUDENT IN AN ORGANIZED HEALTH CARE EDUCATION/TRAINING PROGRAM

## 2023-04-18 RX ORDER — LIDOCAINE 4 G/G
1 PATCH TOPICAL DAILY
Qty: 15 PATCH | Refills: 1 | Status: SHIPPED | OUTPATIENT
Start: 2023-04-19

## 2023-04-18 RX ORDER — MULTIVITAMIN WITH IRON
1 TABLET ORAL DAILY
Qty: 30 TABLET | Refills: 0 | Status: SHIPPED | OUTPATIENT
Start: 2023-04-19

## 2023-04-18 RX ORDER — CITALOPRAM 20 MG/1
20 TABLET ORAL DAILY
Qty: 30 TABLET | Refills: 0 | Status: SHIPPED | OUTPATIENT
Start: 2023-04-19

## 2023-04-18 RX ORDER — MELOXICAM 15 MG/1
15 TABLET ORAL
Qty: 30 TABLET | Refills: 0 | Status: SHIPPED | OUTPATIENT
Start: 2023-04-19

## 2023-04-18 RX ORDER — MELOXICAM 7.5 MG/1
15 TABLET ORAL
Status: DISCONTINUED | OUTPATIENT
Start: 2023-04-19 | End: 2023-04-19 | Stop reason: HOSPADM

## 2023-04-18 RX ORDER — CETIRIZINE HYDROCHLORIDE 10 MG/1
10 TABLET ORAL
Status: COMPLETED | OUTPATIENT
Start: 2023-04-18 | End: 2023-04-18

## 2023-04-18 RX ADMIN — CETIRIZINE HYDROCHLORIDE 10 MG: 10 TABLET, FILM COATED ORAL at 19:53

## 2023-04-18 RX ADMIN — ACETAMINOPHEN 650 MG: 325 TABLET, FILM COATED ORAL at 19:34

## 2023-04-18 RX ADMIN — FOLIC ACID 1 MG: 1 TABLET ORAL at 07:57

## 2023-04-18 RX ADMIN — HYDROXYZINE PAMOATE 50 MG: 50 CAPSULE ORAL at 19:34

## 2023-04-18 RX ADMIN — Medication 100 MG: at 07:57

## 2023-04-18 RX ADMIN — CITALOPRAM HYDROBROMIDE 20 MG: 20 TABLET ORAL at 07:57

## 2023-04-18 RX ADMIN — THERA TABS 1 TABLET: TAB at 07:57

## 2023-04-18 RX ADMIN — MELOXICAM 7.5 MG: 7.5 TABLET ORAL at 07:57

## 2023-04-18 ASSESSMENT — PAIN SCALES - GENERAL: PAINLEVEL_OUTOF10: 5

## 2023-04-18 ASSESSMENT — PAIN DESCRIPTION - LOCATION: LOCATION: HEAD

## 2023-04-18 ASSESSMENT — PAIN DESCRIPTION - DESCRIPTORS: DESCRIPTORS: ACHING

## 2023-04-18 NOTE — BH NOTE
RICHARD Note: The above pt items were returned back to the belongings closet with his name on them at this time.

## 2023-04-18 NOTE — BSMART NOTE
SW Contact:      Pt Contact; Overall throughout the day despite numerous frustrations with his placement including up to the minute before d/c, when [facility] told by [facility] they had miscalculated bed availability & DID NOT HAVE ONE FOR PT, despite they confirming about 10am today & them even sending [facility] to pick pt up. .. pt managed his anxiety & disappointment & used his spirituality to maintain his emotions. Collateral Contact: writer spoke several times with [facility] [place] and finally [facility] . ... WHO CONFIRMED NEW ACCEPTANCE TOMORROW 4/19/23 WITH THEY SENDING [facility] TO PICK HIM UP AT 11AM.     PT GRATEFUL all was worked out. Dr & tx team updated.

## 2023-04-18 NOTE — PLAN OF CARE
Problem: Self Harm/Suicidality  Goal: Will have no self-injury during hospital stay  Description: INTERVENTIONS:  1. Ensure constant observer at bedside with Q15M safety checks  2. Maintain a safe environment  3. Secure patient belongings  4. Ensure family/visitors adhere to safety recommendations  5. Ensure safety tray has been added to patient's diet order  6. Every shift and PRN: Re-assess suicidal risk via Frequent Screener    4/17/2023 2129 by Tiana Stuart RN  Outcome: Progressing     Problem: Anxiety  Goal: Will report anxiety at manageable levels  Description: INTERVENTIONS:  1. Administer medication as ordered  2. Teach and rehearse alternative coping skills  3. Provide emotional support with 1:1 interaction with staff  4/17/2023 2129 by Tiana Stuart RN  Outcome: Progressing    Patient received this am alert and verbal, no SI, or feelings od depression today, continues to be active with treatment plan, coping skills have been positve, compliant with meds, pt out in the milieu at this time talking.

## 2023-04-18 NOTE — PLAN OF CARE
Problem: Self Harm/Suicidality  Goal: Will have no self-injury during hospital stay  Description: INTERVENTIONS:  1. Ensure constant observer at bedside with Q15M safety checks  2. Maintain a safe environment  3. Secure patient belongings  4. Ensure family/visitors adhere to safety recommendations  5. Ensure safety tray has been added to patient's diet order  6. Every shift and PRN: Re-assess suicidal risk via Frequent Screener    4/17/2023 2129 by Sadie Spring RN  Outcome: Progressing  4/17/2023 1019 by Tyra Islas RN  Outcome: Progressing   Pt. Is visible in the milieu. He uses walker for ambulation. He is free from falls, self harm or harming others.

## 2023-04-18 NOTE — GROUP NOTE
Group Therapy Note    Date: 4/18/2023    Group Start Time: 6163  Group End Time: 1700  Group Topic: Nursing    [facility] ADULT CHEM DEP    Marshal Onofre RN        Group Therapy Note    Attendees:7        Patient's Goal:  To learn steps of transitioning into society after discharge    Notes: \"Transitioning into Society\"    Status After Intervention:  Improved    Participation Level:  Active Listener and Interactive    Participation Quality: Appropriate, Attentive, and Supportive      Speech:  normal      Thought Process/Content: Logical      Affective Functioning: Congruent      Mood: elevated      Level of consciousness:  Alert, Oriented x4, and Attentive      Response to Learning: Able to verbalize current knowledge/experience, Able to verbalize/acknowledge new learning, Able to retain information, and Capable of insight      Endings: None Reported    Modes of Intervention: Education and Support      Discipline Responsible: Registered Nurse      Signature:  Marshal Onofre RN

## 2023-04-19 VITALS
OXYGEN SATURATION: 99 % | HEIGHT: 70 IN | DIASTOLIC BLOOD PRESSURE: 66 MMHG | WEIGHT: 220 LBS | RESPIRATION RATE: 18 BRPM | SYSTOLIC BLOOD PRESSURE: 103 MMHG | BODY MASS INDEX: 31.5 KG/M2 | TEMPERATURE: 97.4 F | HEART RATE: 69 BPM

## 2023-04-19 PROCEDURE — 6370000000 HC RX 637 (ALT 250 FOR IP): Performed by: STUDENT IN AN ORGANIZED HEALTH CARE EDUCATION/TRAINING PROGRAM

## 2023-04-19 PROCEDURE — 6370000000 HC RX 637 (ALT 250 FOR IP): Performed by: PSYCHIATRY & NEUROLOGY

## 2023-04-19 RX ADMIN — CITALOPRAM HYDROBROMIDE 20 MG: 20 TABLET ORAL at 08:42

## 2023-04-19 RX ADMIN — MELOXICAM 15 MG: 7.5 TABLET ORAL at 08:42

## 2023-04-19 RX ADMIN — THERA TABS 1 TABLET: TAB at 08:42

## 2023-04-19 RX ADMIN — Medication 100 MG: at 08:42

## 2023-04-19 RX ADMIN — FOLIC ACID 1 MG: 1 TABLET ORAL at 08:42

## 2023-04-19 ASSESSMENT — PAIN SCALES - GENERAL: PAINLEVEL_OUTOF10: 7

## 2023-04-19 ASSESSMENT — PAIN DESCRIPTION - LOCATION: LOCATION: LEG

## 2023-04-19 NOTE — BSMART NOTE
SOCIAL WORK GROUP THERAPY PROGRESS NOTE    Group Time:  10:15am       Group Topic:  Coping Skills    C D Issues    Group Participation:     Pt moderately involved during group discussion but remained attentive. Anxious about new placement. \"Seven Steps\" for taking responsibility for our Happiness was reviewed including commitment to change, self-care, setting limits, goal setting & letting go. Pt sees his recent challenges & set backs as good for having stronger spirituality.

## 2023-04-19 NOTE — BSMART NOTE
Pt. 's case was discussed in  treatment team and staffing this am . Pt will be dc today  to 28 day  residential treatment program with   [facility]   [address], [address]  Phone: [phone]. [facility] staff provided a [place] ride to the facility.       Rojas Olivier MA, LMHP-R

## 2023-04-19 NOTE — PLAN OF CARE
Problem: Self Harm/Suicidality  Goal: Will have no self-injury during hospital stay  Description: INTERVENTIONS:  1. Ensure constant observer at bedside with Q15M safety checks  2. Maintain a safe environment  3. Secure patient belongings  4. Ensure family/visitors adhere to safety recommendations  5. Ensure safety tray has been added to patient's diet order  6. Every shift and PRN: Re-assess suicidal risk via Frequent Screener    4/18/2023 1105 by Hari Ayala RN  Outcome: Adequate for Discharge   Pt. is visible in the milieu. He is pleasant and compliant. He offers no complaint. He is free from falls, self harm or harming others.

## 2023-04-19 NOTE — GROUP NOTE
Group Therapy Note    Date: 4/18/2023    Group Start Time: 2000  Group End Time: 2030  Group Topic: Medication    [facility] ADULT CHEM DEP    Sadie Spring RN        Group Therapy Note    Attendees: 5       Patient's Goal:  Medication compliance.         Status After Intervention:  Improved    Participation Level: Interactive    Participation Quality: Appropriate      Speech:  normal      Thought Process/Content: Logical      Affective Functioning: Flat      Mood: euthymic      Level of consciousness:  Alert      Response to Learning: Able to retain information      Endings: None Reported    Modes of Intervention: Education and Support      Discipline Responsible: Registered Nurse      Signature:  Isabel Yoon RN

## 2023-04-19 NOTE — PLAN OF CARE
Problem: Self Harm/Suicidality  Goal: Will have no self-injury during hospital stay  Description: INTERVENTIONS:  1. Ensure constant observer at bedside with Q15M safety checks  2. Maintain a safe environment  3. Secure patient belongings  4. Ensure family/visitors adhere to safety recommendations  5. Ensure safety tray has been added to patient's diet order  6. Every shift and PRN: Re-assess suicidal risk via Frequent Screener    4/19/2023 1012 by Olayinka Vigil RN  Outcome: Adequate for Discharge  4/18/2023 2120 by Elissa Cortes RN  Outcome: Progressing     Problem: Anxiety  Goal: Will report anxiety at manageable levels  Description: INTERVENTIONS:  1. Administer medication as ordered  2. Teach and rehearse alternative coping skills  3. Provide emotional support with 1:1 interaction with staff  4/19/2023 1012 by Olayinka Vigil RN  Outcome: Adequate for Discharge  4/18/2023 2120 by Elissa Cortes RN  Outcome: Progressing     Patient alert and oriented, in no acute distress at this time. Patient goal for today is to \"Press forward\". . Patient is compliant with taking medications at this time. Patient denies auditory hallucinations, but states he has visual when asked what he sees patient states \"its all blurry\". No c/o suicidal ideations, harm to self or others. Patient is sitting quietly in day room watching tv. Patient will be discharged today. Will continue to monitor for location, safety and comfort.

## 2023-04-19 NOTE — PROGRESS NOTES
[address] [facility] [address]  Inpatient Progress Note     Date of Service: 04/17/23  Hospital Day: 8     Subjective/Interval History   04/17/23    Treatment Team Notes:  Notes reviewed and/or discussed and report that Alejandro Villanueva is is a patient with a history of depression and substance use disorder, discussed with the treatment team this morning. Patient interview: Alejandro Villanueva was interviewed by this writer today. The patient continues to participate appropriately. He continues to take his medications without any difficulties, and describes his depression as becoming more stable. The assigned patient  mentioned that the patient has a phone conference with [facility] rehab program at 11:00 this morning, however he does  have a placement available for him at [facility] in [place] for tomorrow. So very possibly he will be discharged with the latter. Objective     Vitals:    04/17/23 0851   BP: 100/65   Pulse: 65   Resp: 18   Temp: (!) 96.6 °F (35.9 °C)   SpO2:      Vitals are stable, no specific medical reason for which his temperature was low this morning    No results found for this or any previous visit (from the past 24 hour(s)). Mental Status Examination     Appearance/Hygiene 48 y.o.  Other  male  Hygiene: Fair   Behavior/Social Relatedness Appropriate   Musculoskeletal Gait/Station: appropriate with the help of a walker  Tone (flaccid, cogwheeling, spastic): not assessed  Psychomotor (hyperkinetic, hypokinetic): calm   Involuntary movements (tics, dyskinesias, akathisa, stereotypies): none   Speech   Rate, rhythm, volume, fluency and articulation are appropriate   Mood   Depression is much improved    Affect    Congruent   Thought Process Linear and goal directed   Thought Content and Perceptual Disturbances Denies self-injurious behavior (SIB), suicidal ideation (SI), aggressive behavior or homicidal ideation (HI)    Denies auditory and
Comprehensive Nutrition Assessment    Type and Reason for Visit:  Initial, RD Nutrition Re-Screen/LOS    Nutrition Recommendations/Plan:   Continue MVI, thiamine supplementation. Plan to add folic acid supplement r/t h/o ETOH abuse. Continue current diet and monitor PO intake, weight, and POC while admitted. Malnutrition Assessment:  Malnutrition Status: At risk for malnutrition (Comment) (r/t increased nutrient needs 2/2 ETOH abuse) (04/17/23 1311)      Nutrition History and Allergies:   PMHx of total knee replacement 02/2023, depression, ETOH abuse. NKFA. Wt hx review: c wt- 220 lb; 3/22/23- 230 lb (-4.3%); 2/20/23- 221 lb; 1/08/23- 230 lb; 7/21/22- 233.6 lb (-5.8%); 5/25/22- 233.4 lb (-5.7%). No significant wt loss documented. Nutrition Assessment:    Pt admitted voluntarily for management of detoxification from alcohol. Current d/c plans to rehab program. Meal and medication compliant. Visited pt- endorsed poor intake PTA 2/2 alcohol intake. Pt has been finishing most meals since admission, tolerating food well. Nutrition Related Findings:    No output data in chart. Labs: reviewed (4/09). Pertinent meds: MVI, thiamine. Wound Type: None       Current Nutrition Intake & Therapies:    Average Meal Intake: %  Average Supplements Intake: None Ordered  ADULT DIET; Regular    Anthropometric Measures:  Height: 5' 10\" (177.8 cm)  Ideal Body Weight (IBW): 166 lbs (75 kg)       Current Body Weight: 220 lb (99.8 kg), 132.5 % IBW. Weight Source: Stated  Current BMI (kg/m2): 31.6  Usual Body Weight: 230 lb (104.3 kg) (3/22/23)  % Weight Change (Calculated): -4.3  Weight Adjustment For: No Adjustment  BMI Categories: Obese Class 1 (BMI 30.0-34. 9)    Estimated Daily Nutrient Needs:  Energy Requirements Based On: Formula (MSJ x 1-1.1)  Weight Used for Energy Requirements: Current  Energy (kcal/day): 1850 - 2035  Weight Used for Protein Requirements: Current (0.8-1)  Protein (g/day): 80 - 100  Method Used
Patient discharge was cancelled due to availability error, Thompson Bill spoke with representative at [facility]  to ensure discharge would take place tomorrow and that patient had valid placement, event was explained to patient by , patient agreeable with staying another day.
Patient left via transportation to [place], in no acute distress. Patient had all belongings with him and was escorted down by staff.
depression without psychotic features [facility])       Medical Diagnoses: See April 12, 2023 progress note    Psychosocial and contextual factors: Same    Level of impairment/disability: Moderate. 1.  Medications the same, the patient's discharge is being delayed until tomorrow  2. Reviewed instructions, risks, benefits and side effects of medications  3. Disposition/Discharge Date: As above stated.     Glynn Fothergill, MD, [address] [place] [address]

## 2023-07-26 ENCOUNTER — OFFICE VISIT (OUTPATIENT)
Age: 53
End: 2023-07-26

## 2023-07-26 VITALS — WEIGHT: 226 LBS | BODY MASS INDEX: 32.35 KG/M2 | HEIGHT: 70 IN

## 2023-07-26 DIAGNOSIS — M25.562 ACUTE PAIN OF LEFT KNEE: ICD-10-CM

## 2023-07-26 DIAGNOSIS — Z96.652 PRESENCE OF LEFT ARTIFICIAL KNEE JOINT: Primary | ICD-10-CM

## 2023-07-26 RX ORDER — ACETAMINOPHEN 160 MG
TABLET,DISINTEGRATING ORAL
COMMUNITY
Start: 2023-07-17

## 2023-07-26 RX ORDER — FOLIC ACID 1 MG/1
TABLET ORAL
COMMUNITY
Start: 2023-07-17

## 2023-07-26 RX ORDER — LIDOCAINE 50 MG/G
PATCH TOPICAL
COMMUNITY
Start: 2023-06-24

## 2023-07-26 RX ORDER — TRAZODONE HYDROCHLORIDE 100 MG/1
TABLET ORAL
COMMUNITY
Start: 2023-07-25

## 2023-07-26 RX ORDER — B-COMPLEX WITH VITAMIN C
TABLET ORAL
COMMUNITY
Start: 2023-07-17

## 2023-07-26 RX ORDER — LANOLIN ALCOHOL/MO/W.PET/CERES
CREAM (GRAM) TOPICAL
COMMUNITY
Start: 2023-07-25

## 2023-07-26 RX ORDER — IBUPROFEN 400 MG/1
TABLET ORAL
COMMUNITY
Start: 2023-05-15

## 2023-07-26 RX ORDER — HYDROXYZINE PAMOATE 50 MG/1
CAPSULE ORAL
COMMUNITY
Start: 2023-07-09

## 2025-05-28 ENCOUNTER — APPOINTMENT (OUTPATIENT)
Facility: HOSPITAL | Age: 55
End: 2025-05-28
Payer: COMMERCIAL

## 2025-05-28 ENCOUNTER — HOSPITAL ENCOUNTER (EMERGENCY)
Facility: HOSPITAL | Age: 55
Discharge: HOME | End: 2025-05-28
Attending: STUDENT IN AN ORGANIZED HEALTH CARE EDUCATION/TRAINING PROGRAM
Payer: COMMERCIAL

## 2025-05-28 VITALS
TEMPERATURE: 99 F | HEIGHT: 70 IN | HEART RATE: 72 BPM | BODY MASS INDEX: 28.63 KG/M2 | RESPIRATION RATE: 16 BRPM | DIASTOLIC BLOOD PRESSURE: 84 MMHG | SYSTOLIC BLOOD PRESSURE: 133 MMHG | WEIGHT: 200 LBS | OXYGEN SATURATION: 98 %

## 2025-05-28 DIAGNOSIS — B35.1 ONYCHOMYCOSIS: ICD-10-CM

## 2025-05-28 DIAGNOSIS — J20.9 ACUTE BRONCHITIS, UNSPECIFIED ORGANISM: Primary | ICD-10-CM

## 2025-05-28 LAB
FLUAV RNA SPEC QL NAA+PROBE: NOT DETECTED
FLUBV RNA SPEC QL NAA+PROBE: NOT DETECTED
SARS-COV-2 RNA RESP QL NAA+PROBE: NOT DETECTED
SOURCE: NORMAL

## 2025-05-28 PROCEDURE — 87636 SARSCOV2 & INF A&B AMP PRB: CPT

## 2025-05-28 PROCEDURE — 99285 EMERGENCY DEPT VISIT HI MDM: CPT

## 2025-05-28 PROCEDURE — 71046 X-RAY EXAM CHEST 2 VIEWS: CPT

## 2025-05-28 RX ORDER — ALBUTEROL SULFATE 90 UG/1
2 INHALANT RESPIRATORY (INHALATION) 4 TIMES DAILY PRN
Qty: 18 G | Refills: 0 | Status: SHIPPED | OUTPATIENT
Start: 2025-05-28

## 2025-05-28 RX ORDER — GUAIFENESIN 600 MG/1
600 TABLET, EXTENDED RELEASE ORAL 2 TIMES DAILY
Qty: 30 TABLET | Refills: 0 | Status: SHIPPED | OUTPATIENT
Start: 2025-05-28 | End: 2025-06-12

## 2025-05-28 ASSESSMENT — PAIN - FUNCTIONAL ASSESSMENT: PAIN_FUNCTIONAL_ASSESSMENT: NONE - DENIES PAIN

## 2025-05-28 NOTE — ED PROVIDER NOTES
EMERGENCY DEPARTMENT HISTORY AND PHYSICAL EXAM      Date: 5/28/2025  Patient Name: Clyde Estrada    History of Presenting Illness     Chief Complaint   Patient presents with    Cough    Toe Pain       History (Context): Clyde Estrada is a 55 y.o. male  presents to the ED today with chief complaint of cough, congestion, as well as bilateral toenail complaint.  Patient states that has been having a cough and congestion over the past 4 months.  States that is not improving and decided to come to the emergency department after speaking with his \"\" to receive further evaluation.  Patient denies any dyspnea, chest pain, fever, or chills associated with his symptoms.  Also states that he was looking to have his \"toenails cut.\"  States that they have been not taking care of and previously had been homeless.  He denies any complaints with his toes outside of the toenail at this time.      PCP: Mireya Woods APRN - NP    No current facility-administered medications for this encounter.     Current Outpatient Medications   Medication Sig Dispense Refill    Efinaconazole 10 % SOLN Apply 1 Application topically daily To be applied to the toenails 8 mL 0    guaiFENesin (MUCINEX) 600 MG extended release tablet Take 1 tablet by mouth 2 times daily for 15 days 30 tablet 0    albuterol sulfate HFA (VENTOLIN HFA) 108 (90 Base) MCG/ACT inhaler Inhale 2 puffs into the lungs 4 times daily as needed for Wheezing 18 g 0    B Complex Vitamins (VITAMIN B COMPLEX) TABS       Cholecalciferol (VITAMIN D3) 50 MCG (2000 UT) CAPS       diclofenac sodium (VOLTAREN) 1 % GEL       folic acid (FOLVITE) 1 MG tablet       hydrOXYzine pamoate (VISTARIL) 50 MG capsule       ibuprofen (ADVIL;MOTRIN) 400 MG tablet       lidocaine (LIDODERM) 5 %       melatonin 3 MG TABS tablet       traZODone (DESYREL) 100 MG tablet       citalopram (CELEXA) 20 MG tablet Take 1 tablet by mouth daily 30 tablet 0    lidocaine 4 % external patch  50 MCG (2000 UT) Caps capsule  Commonly known as: CHOLECALCIFEROL           * This list has 2 medication(s) that are the same as other medications prescribed for you. Read the directions carefully, and ask your doctor or other care provider to review them with you.                   Where to Get Your Medications        These medications were sent to [facility] [address] [place] [address] - [address] [facility] - P [phone] - F [phone] [address] [facility] [address]      Phone: [phone]   albuterol sulfate  (90 Base) MCG/ACT inhaler  Efinaconazole 10 % Soln  guaiFENesin 600 MG extended release tablet         Disposition: Home    Patient condition at time of disposition: Stable    DISCHARGE NOTE:   Pt has been reexamined. Patient has no new complaints, changes, or physical findings.  Care plan outlined and precautions discussed.  Results were reviewed with the patient. All medications were reviewed with the patient. All of pt's questions and concerns were addressed.  Alarm symptoms and return precautions associated with chief complaint and evaluation were reviewed with the patient in detail.  The patient demonstrated adequate understanding.  Patient was instructed to follow up with PCP, as well as given strict return precautions to the ED upon further deterioration. Patient is ready for discharge home.          Dragon Disclaimer     Please note that this dictation was completed with Skype, the computer voice recognition software.  Quite often unanticipated grammatical, syntax, homophones, and other interpretive errors are inadvertently transcribed by the computer software.  Please disregard these errors.  Please excuse any errors that have escaped final proofreading.      Jonathan Pendleton Jr., DO  05/28/25 0712

## 2025-05-28 NOTE — ED TRIAGE NOTES
Pt arrives to triage reports having a cough and green phlegm for the last few months   Pt also wants his toe nails looked at, he reports needing to get them cut

## (undated) DEVICE — KIT CLN UP BON SECOURS MARYV

## (undated) DEVICE — WATERPROOF, BACTERIA PROOF DRESSING WITH ABSORBENT SEE THROUGH PAD: Brand: OPSITE POST-OP VISIBLE 35X10CM CTN 20

## (undated) DEVICE — HOOD WITH PEEL AWAY FACE SHIELD: Brand: T7PLUS

## (undated) DEVICE — KIT EVAC 400CC DIA1/4IN H PAT 12.5IN 3 SPR RND SHP PVC DRN

## (undated) DEVICE — SOLUTION IRRIG 3000ML 0.9% SOD CHL FLX CONT 0797208] ICU MEDICAL INC]

## (undated) DEVICE — STERILE POLYISOPRENE POWDER-FREE SURGICAL GLOVES: Brand: PROTEXIS

## (undated) DEVICE — 4-PORT MANIFOLD: Brand: NEPTUNE 2

## (undated) DEVICE — INTENDED FOR TISSUE SEPARATION, AND OTHER PROCEDURES THAT REQUIRE A SHARP SURGICAL BLADE TO PUNCTURE OR CUT.: Brand: BARD-PARKER SAFETY BLADES SIZE 10, STERILE

## (undated) DEVICE — BNDG,ELSTC,MATRIX,STRL,6"X5YD,LF,HOOK&LP: Brand: MEDLINE

## (undated) DEVICE — TAPE,CLOTH/SILK,CURAD,3"X10YD,LF,40/CS: Brand: CURAD

## (undated) DEVICE — APPLICATOR MEDICATED 26 CC SOLUTION HI LT ORNG CHLORAPREP

## (undated) DEVICE — PIN DRL DIA1/8IN QUIK REL FOR VANGUARD UNIV INSTR TOT KNEE 32486265] ZIMMER BIOMET ORTHOPEDICS]

## (undated) DEVICE — PREMIUM DRY TRAY LF: Brand: MEDLINE INDUSTRIES, INC.

## (undated) DEVICE — HANDPIECE SET WITH HIGH FLOW TIP AND SUCTION TUBE: Brand: INTERPULSE

## (undated) DEVICE — DRESSING FOAM DISK DIA1IN H 7MM HYDRPHLC CHG IMPREG IN SL

## (undated) DEVICE — SUTURE STRATAFIX SPRL SZ 1 L14IN ABSRB VLT L48CM CTX 1/2 SXPD2B405

## (undated) DEVICE — DECANTER BAG 9": Brand: MEDLINE INDUSTRIES, INC.

## (undated) DEVICE — SUTURE VCRL SZ 2 L27IN ABSRB VLT L65MM TP-1 1/2 CIR J649G

## (undated) DEVICE — WATERPROOF, BACTERIA PROOF DRESSING WITH ABSORBENT SEE THROUGH PAD: Brand: OPSITE POST-OP VISIBLE 30X10CM CTN 20

## (undated) DEVICE — BANDAGE COMPR W6INXL5YD WHT BGE POLY COT M E WRP WV HK AND

## (undated) DEVICE — DRAPE TWL SURG 16X26IN BLU ORB04] ALLCARE INC]

## (undated) DEVICE — Device

## (undated) DEVICE — 3M™ TEGADERM™ TRANSPARENT FILM DRESSING FRAME STYLE, 1626W, 4 IN X 4-3/4 IN (10 CM X 12 CM), 50/CT 4CT/CASE: Brand: 3M™ TEGADERM™

## (undated) DEVICE — OPTIFOAM GENTLE SA, POSTOP, 4X12: Brand: MEDLINE

## (undated) DEVICE — SOLUTION IV 1000ML 0.9% SOD CHL

## (undated) DEVICE — Device: Brand: JELCO

## (undated) DEVICE — PADDING CAST W6INXL4YD ST COT COHESIVE HND TEARABLE SPEC

## (undated) DEVICE — PACK SURG BSHR TOT KNEE LF

## (undated) DEVICE — ELECTRODE PT RET AD L9FT HI MOIST COND ADH HYDRGEL CORDED

## (undated) DEVICE — 2108 SERIES SAGITTAL BLADE (20.7 X 0.88 X 85.0MM)

## (undated) DEVICE — SUTURE ABSORBABLE BRAIDED 2-0 CT-1 27 IN UD VICRYL J259H

## (undated) DEVICE — BLADE, TONGUE, 6", STERILE: Brand: MEDLINE